# Patient Record
Sex: MALE | Race: WHITE
[De-identification: names, ages, dates, MRNs, and addresses within clinical notes are randomized per-mention and may not be internally consistent; named-entity substitution may affect disease eponyms.]

---

## 2017-03-30 ENCOUNTER — HOSPITAL ENCOUNTER (OUTPATIENT)
Dept: HOSPITAL 62 - OD | Age: 67
End: 2017-03-30
Attending: INTERNAL MEDICINE
Payer: MEDICARE

## 2017-03-30 DIAGNOSIS — R80.9: ICD-10-CM

## 2017-03-30 DIAGNOSIS — N25.81: ICD-10-CM

## 2017-03-30 DIAGNOSIS — N18.4: Primary | ICD-10-CM

## 2017-03-30 DIAGNOSIS — D50.9: ICD-10-CM

## 2017-03-30 LAB
ALBUMIN SERPL-MCNC: 4 G/DL (ref 3.5–5)
ANION GAP SERPL CALC-SCNC: 12 MMOL/L (ref 5–19)
APPEARANCE UR: CLEAR
BASOPHILS # BLD AUTO: 0 10^3/UL (ref 0–0.2)
BASOPHILS NFR BLD AUTO: 0.7 % (ref 0–2)
BILIRUB UR QL STRIP: NEGATIVE
BUN SERPL-MCNC: 55 MG/DL (ref 7–20)
CALCIUM: 9.4 MG/DL (ref 8.4–10.2)
CHLORIDE SERPL-SCNC: 114 MMOL/L (ref 98–107)
CO2 SERPL-SCNC: 21 MMOL/L (ref 22–30)
CREAT SERPL-MCNC: 4.13 MG/DL (ref 0.52–1.25)
EOSINOPHIL # BLD AUTO: 0.2 10^3/UL (ref 0–0.6)
EOSINOPHIL NFR BLD AUTO: 3.4 % (ref 0–6)
ERYTHROCYTE [DISTWIDTH] IN BLOOD BY AUTOMATED COUNT: 13.6 % (ref 11.5–14)
FERRITIN SERPL-MCNC: 45.6 NG/ML (ref 17.9–464)
GLUCOSE SERPL-MCNC: 103 MG/DL (ref 75–110)
GLUCOSE UR STRIP-MCNC: NEGATIVE MG/DL
HCT VFR BLD CALC: 29.7 % (ref 37.9–51)
HGB BLD-MCNC: 10 G/DL (ref 13.5–17)
HGB HCT DIFFERENCE: 0.3
KETONES UR STRIP-MCNC: NEGATIVE MG/DL
LYMPHOCYTES # BLD AUTO: 0.8 10^3/UL (ref 0.5–4.7)
LYMPHOCYTES NFR BLD AUTO: 17.4 % (ref 13–45)
MCH RBC QN AUTO: 31.4 PG (ref 27–33.4)
MCHC RBC AUTO-ENTMCNC: 33.8 G/DL (ref 32–36)
MCV RBC AUTO: 93 FL (ref 80–97)
MONOCYTES # BLD AUTO: 0.4 10^3/UL (ref 0.1–1.4)
MONOCYTES NFR BLD AUTO: 9 % (ref 3–13)
NEUTROPHILS # BLD AUTO: 3.1 10^3/UL (ref 1.7–8.2)
NEUTS SEG NFR BLD AUTO: 69.5 % (ref 42–78)
NITRITE UR QL STRIP: NEGATIVE
PH UR STRIP: 5 [PH] (ref 5–9)
PHOSPHATE SERPL-MCNC: 6 MG/DL (ref 2.5–4.5)
POTASSIUM SERPL-SCNC: 6.1 MMOL/L (ref 3.6–5)
PROT UR STRIP-MCNC: NEGATIVE MG/DL
RBC # BLD AUTO: 3.19 10^6/UL (ref 4.35–5.55)
SODIUM SERPL-SCNC: 147.1 MMOL/L (ref 137–145)
SP GR UR STRIP: 1.01
UROBILINOGEN UR-MCNC: NEGATIVE MG/DL (ref ?–2)
WBC # BLD AUTO: 4.5 10^3/UL (ref 4–10.5)

## 2017-03-30 PROCEDURE — 82040 ASSAY OF SERUM ALBUMIN: CPT

## 2017-03-30 PROCEDURE — 80048 BASIC METABOLIC PNL TOTAL CA: CPT

## 2017-03-30 PROCEDURE — 83550 IRON BINDING TEST: CPT

## 2017-03-30 PROCEDURE — 82043 UR ALBUMIN QUANTITATIVE: CPT

## 2017-03-30 PROCEDURE — 84100 ASSAY OF PHOSPHORUS: CPT

## 2017-03-30 PROCEDURE — 85025 COMPLETE CBC W/AUTO DIFF WBC: CPT

## 2017-03-30 PROCEDURE — 81001 URINALYSIS AUTO W/SCOPE: CPT

## 2017-03-30 PROCEDURE — 82570 ASSAY OF URINE CREATININE: CPT

## 2017-03-30 PROCEDURE — 82728 ASSAY OF FERRITIN: CPT

## 2017-03-30 PROCEDURE — 82306 VITAMIN D 25 HYDROXY: CPT

## 2017-03-30 PROCEDURE — 36415 COLL VENOUS BLD VENIPUNCTURE: CPT

## 2017-03-30 PROCEDURE — 83540 ASSAY OF IRON: CPT

## 2017-03-30 PROCEDURE — 83970 ASSAY OF PARATHORMONE: CPT

## 2017-03-31 LAB
25(OH)D3 SERPL-MCNC: 45.8 NG/ML (ref 30–100)
CREAT UR-MCNC: 65.1 MG/DL
MICROALBUMIN UR-MCNC: 61.4 UG/ML
PTH-INTACT SERPL-MCNC: 130 PG/ML (ref 15–65)

## 2017-04-05 ENCOUNTER — HOSPITAL ENCOUNTER (OUTPATIENT)
Dept: HOSPITAL 62 - OD | Age: 67
End: 2017-04-05
Attending: INTERNAL MEDICINE
Payer: MEDICARE

## 2017-04-05 DIAGNOSIS — E87.5: Primary | ICD-10-CM

## 2017-04-05 PROCEDURE — 36415 COLL VENOUS BLD VENIPUNCTURE: CPT

## 2017-04-05 PROCEDURE — 84132 ASSAY OF SERUM POTASSIUM: CPT

## 2017-06-16 ENCOUNTER — HOSPITAL ENCOUNTER (OUTPATIENT)
Dept: HOSPITAL 62 - OD | Age: 67
End: 2017-06-16
Attending: INTERNAL MEDICINE
Payer: MEDICARE

## 2017-06-16 DIAGNOSIS — D50.9: ICD-10-CM

## 2017-06-16 DIAGNOSIS — N18.4: Primary | ICD-10-CM

## 2017-06-16 DIAGNOSIS — N25.81: ICD-10-CM

## 2017-06-16 LAB
ANION GAP SERPL CALC-SCNC: 14 MMOL/L (ref 5–19)
BASOPHILS # BLD AUTO: 0 10^3/UL (ref 0–0.2)
BASOPHILS NFR BLD AUTO: 0.7 % (ref 0–2)
BUN SERPL-MCNC: 47 MG/DL (ref 7–20)
CALCIUM: 9 MG/DL (ref 8.4–10.2)
CHLORIDE SERPL-SCNC: 112 MMOL/L (ref 98–107)
CO2 SERPL-SCNC: 23 MMOL/L (ref 22–30)
CREAT SERPL-MCNC: 4.22 MG/DL (ref 0.52–1.25)
EOSINOPHIL # BLD AUTO: 0.1 10^3/UL (ref 0–0.6)
EOSINOPHIL NFR BLD AUTO: 2.9 % (ref 0–6)
ERYTHROCYTE [DISTWIDTH] IN BLOOD BY AUTOMATED COUNT: 13.7 % (ref 11.5–14)
FERRITIN SERPL-MCNC: 60.8 NG/ML (ref 17.9–464)
GLUCOSE SERPL-MCNC: 111 MG/DL (ref 75–110)
HCT VFR BLD CALC: 32.5 % (ref 37.9–51)
HGB BLD-MCNC: 10.7 G/DL (ref 13.5–17)
HGB HCT DIFFERENCE: -0.4
LYMPHOCYTES # BLD AUTO: 0.9 10^3/UL (ref 0.5–4.7)
LYMPHOCYTES NFR BLD AUTO: 18.9 % (ref 13–45)
MCH RBC QN AUTO: 31.3 PG (ref 27–33.4)
MCHC RBC AUTO-ENTMCNC: 33 G/DL (ref 32–36)
MCV RBC AUTO: 95 FL (ref 80–97)
MONOCYTES # BLD AUTO: 0.5 10^3/UL (ref 0.1–1.4)
MONOCYTES NFR BLD AUTO: 9.9 % (ref 3–13)
NEUTROPHILS # BLD AUTO: 3.3 10^3/UL (ref 1.7–8.2)
NEUTS SEG NFR BLD AUTO: 67.6 % (ref 42–78)
PHOSPHATE SERPL-MCNC: 5 MG/DL (ref 2.5–4.5)
POTASSIUM SERPL-SCNC: 5.4 MMOL/L (ref 3.6–5)
RBC # BLD AUTO: 3.43 10^6/UL (ref 4.35–5.55)
SODIUM SERPL-SCNC: 149.2 MMOL/L (ref 137–145)
WBC # BLD AUTO: 4.9 10^3/UL (ref 4–10.5)

## 2017-06-16 PROCEDURE — 80048 BASIC METABOLIC PNL TOTAL CA: CPT

## 2017-06-16 PROCEDURE — 83540 ASSAY OF IRON: CPT

## 2017-06-16 PROCEDURE — 82728 ASSAY OF FERRITIN: CPT

## 2017-06-16 PROCEDURE — 83970 ASSAY OF PARATHORMONE: CPT

## 2017-06-16 PROCEDURE — 83550 IRON BINDING TEST: CPT

## 2017-06-16 PROCEDURE — 84100 ASSAY OF PHOSPHORUS: CPT

## 2017-06-16 PROCEDURE — 85025 COMPLETE CBC W/AUTO DIFF WBC: CPT

## 2017-06-16 PROCEDURE — 36415 COLL VENOUS BLD VENIPUNCTURE: CPT

## 2017-08-24 ENCOUNTER — HOSPITAL ENCOUNTER (OUTPATIENT)
Dept: HOSPITAL 62 - END | Age: 67
Discharge: HOME | End: 2017-08-24
Attending: SURGERY
Payer: MEDICARE

## 2017-08-24 VITALS — SYSTOLIC BLOOD PRESSURE: 145 MMHG | DIASTOLIC BLOOD PRESSURE: 79 MMHG

## 2017-08-24 DIAGNOSIS — Q61.3: ICD-10-CM

## 2017-08-24 DIAGNOSIS — Z86.73: ICD-10-CM

## 2017-08-24 DIAGNOSIS — N40.0: ICD-10-CM

## 2017-08-24 DIAGNOSIS — D12.0: ICD-10-CM

## 2017-08-24 DIAGNOSIS — I10: ICD-10-CM

## 2017-08-24 DIAGNOSIS — D12.2: ICD-10-CM

## 2017-08-24 DIAGNOSIS — Z12.11: Primary | ICD-10-CM

## 2017-08-24 DIAGNOSIS — Z88.0: ICD-10-CM

## 2017-08-24 DIAGNOSIS — E78.00: ICD-10-CM

## 2017-08-24 DIAGNOSIS — M19.90: ICD-10-CM

## 2017-08-24 DIAGNOSIS — K57.30: ICD-10-CM

## 2017-08-24 DIAGNOSIS — E78.1: ICD-10-CM

## 2017-08-24 DIAGNOSIS — D64.9: ICD-10-CM

## 2017-08-24 PROCEDURE — 88305 TISSUE EXAM BY PATHOLOGIST: CPT

## 2017-08-24 PROCEDURE — 0DBH8ZX EXCISION OF CECUM, VIA NATURAL OR ARTIFICIAL OPENING ENDOSCOPIC, DIAGNOSTIC: ICD-10-PCS | Performed by: SURGERY

## 2017-08-24 PROCEDURE — 0DBF8ZX EXCISION OF RIGHT LARGE INTESTINE, VIA NATURAL OR ARTIFICIAL OPENING ENDOSCOPIC, DIAGNOSTIC: ICD-10-PCS | Performed by: SURGERY

## 2017-08-24 PROCEDURE — 45380 COLONOSCOPY AND BIOPSY: CPT

## 2017-08-24 RX ADMIN — MIDAZOLAM HYDROCHLORIDE ONE MG: 1 INJECTION, SOLUTION INTRAMUSCULAR; INTRAVENOUS at 08:36

## 2017-08-24 RX ADMIN — FENTANYL CITRATE ONE MCG: 50 INJECTION INTRAMUSCULAR; INTRAVENOUS at 08:46

## 2017-08-24 RX ADMIN — FENTANYL CITRATE ONE MCG: 50 INJECTION INTRAMUSCULAR; INTRAVENOUS at 08:38

## 2017-08-24 RX ADMIN — MIDAZOLAM HYDROCHLORIDE ONE MG: 1 INJECTION, SOLUTION INTRAMUSCULAR; INTRAVENOUS at 08:41

## 2017-08-24 RX ADMIN — MIDAZOLAM HYDROCHLORIDE ONE MG: 1 INJECTION, SOLUTION INTRAMUSCULAR; INTRAVENOUS at 08:51

## 2017-08-24 NOTE — PDOC DISCHARGE SUMMARY
Discharge Summary (SDC)





- Discharge


Final Diagnosis: 





colon polyps; diverticuloses


Date of Surgery: 08/24/17


Discharge Date: 08/24/17


Condition: Good


Treatment or Instructions: 


               





                  [facility] [place] [address]


 Phone: [phone]    Fax:  [phone]








            


            POST ENDOSCOPY DISCHARGE INSTRUCTIONS








1.  Diet:  Start clear liquids that a regular diet as tolerated.





2.  Resume all preoperative medications.  All oral anticoagulants and aspirins 

can be resumed 24 hours after procedure.





3.  If a polypectomy was performed some bleeding per rectum may occur.  This 

should stop within 3 days.  If not, please contact the office.





4.  If you had a colonoscopy you may experience some bloating and delayed 

return of normal bowel function for several days, your regular bowel movement 

pattern should resume within a week.





5.  Please contact [facility] at [phone] to make an 

appointment with Dr. Cardenas for 1 to 3 weeks following procedure.





6.  If you have any questions or concerns regarding your care,treatment plan or 

follow up, please contact our office.





7.  Per clinical guidelines we recommend you undergo a repeat colonoscopy in 

three years.


Referrals: 


XIMENA TONY MD [Primary Care Provider] - 


Discharge Diet: As Tolerated


Discharge Activity: Activity As Tolerated


Home Care Assistance: None Needed


Report the Following to Your Physician Immediately: Shortness of Breath, 

Increase in Pain, Fever over 101 Degrees

## 2017-08-24 NOTE — OPERATIVE REPORT E
Operative Report



NAME: ZAKIA GOODWIN

MRN:  [id number]          : 1950 AGE:  67Y

DATE OF SURGERY: 2017               ROOM:



PREOPERATIVE DIAGNOSIS:

Personal history of colon polyps.



POSTOPERATIVE DIAGNOSES:

1.  Polyps of the cecum and right colon.

2.  Sigmoid and left colon diverticulosis.

3.  Prostatic enlargement.



PROCEDURE:

1.  Total colonoscopy to the cecum with photo documentation.

2.  Cecal and right colon polypectomy.



SURGEON:

BOBBY SAHU M.D.



ANESTHESIA:

Conscious sedation.



COMPLICATIONS:

None.



ESTIMATED BLOOD LOSS:

Scant.



DRAINS:

None.



TISSUE REMOVED OR ALTERED:

Colon polyps x2.



FINDINGS:

See below.



SUMMARY OF PROCEDURE:

The patient was brought from the fifth floor endoscopy waiting area to the

endoscopy suite where conscious sedation was induced.  The patient was

placed in a left lateral decubitus position.  Surgical plan and surgical

timeout were conducted.



Rectal exam revealed no perianal pathology.  The posterior surface of the

prostate gland was enlarged.



The flexible adult colonoscope was advanced through the anorectal canal

all the way to the cecum.  This was an excellent study on a well prepped

bowel.  Transillumination of the anterior abdominal wall, the right lower

quadrant, and visualization of the ileocecal valve confirmed cecal

intubation.



Along the wall of the cecum was a sessile polyp photographed and removed

with the cold forceps device with 2 bites.  Bleeding was minimal. 

Specimen was sent as cecal polyp.



We withdrew the scope further through the right colon and there was

another smaller sessile polyp, photographed and retrieved with the cold

forceps device.  Bleeding was minimal.



The remainder of the colon was unremarkable except for a moderate of

diverticulosis of the left and sigmoid colon.  Photographs were taken. 

There was no evidence of stenosis or stricture.  Scope was withdrawn from

the patient's anus.  He tolerated the procedure well.  He was taken to the

recovery room in stable condition.



Per surveillance guidelines, patient will be an appropriate candidate for

followup colonoscopy in approximately 3 years, or sooner if any symptoms

develop.





DICTATING PHYSICIAN:  BOBBY SAHU M.D.





[id number]                  DT: 2017    [id number]

PHY#: [id number]            DD: 2017    0936

ID:   [id number]           JOB#: [id number]       ACCT: [id number]



cc:BOBBY SAHU M.D.

>

## 2017-08-28 ENCOUNTER — HOSPITAL ENCOUNTER (OUTPATIENT)
Dept: HOSPITAL 62 - OD | Age: 67
End: 2017-08-28
Attending: INTERNAL MEDICINE
Payer: MEDICARE

## 2017-08-28 DIAGNOSIS — D63.1: ICD-10-CM

## 2017-08-28 DIAGNOSIS — N18.5: Primary | ICD-10-CM

## 2017-08-28 DIAGNOSIS — N25.81: ICD-10-CM

## 2017-08-28 LAB
ANION GAP SERPL CALC-SCNC: 16 MMOL/L (ref 5–19)
BASOPHILS # BLD AUTO: 0 10^3/UL (ref 0–0.2)
BASOPHILS NFR BLD AUTO: 0.5 % (ref 0–2)
BUN SERPL-MCNC: 50 MG/DL (ref 7–20)
CALCIUM: 9.3 MG/DL (ref 8.4–10.2)
CHLORIDE SERPL-SCNC: 111 MMOL/L (ref 98–107)
CO2 SERPL-SCNC: 19 MMOL/L (ref 22–30)
CREAT SERPL-MCNC: 4.34 MG/DL (ref 0.52–1.25)
EOSINOPHIL # BLD AUTO: 0.2 10^3/UL (ref 0–0.6)
EOSINOPHIL NFR BLD AUTO: 3.3 % (ref 0–6)
ERYTHROCYTE [DISTWIDTH] IN BLOOD BY AUTOMATED COUNT: 13.1 % (ref 11.5–14)
GLUCOSE SERPL-MCNC: 102 MG/DL (ref 75–110)
HCT VFR BLD CALC: 29.5 % (ref 37.9–51)
HGB BLD-MCNC: 10 G/DL (ref 13.5–17)
HGB HCT DIFFERENCE: 0.5
LYMPHOCYTES # BLD AUTO: 0.8 10^3/UL (ref 0.5–4.7)
LYMPHOCYTES NFR BLD AUTO: 16.3 % (ref 13–45)
MCH RBC QN AUTO: 32.2 PG (ref 27–33.4)
MCHC RBC AUTO-ENTMCNC: 33.8 G/DL (ref 32–36)
MCV RBC AUTO: 95 FL (ref 80–97)
MONOCYTES # BLD AUTO: 0.4 10^3/UL (ref 0.1–1.4)
MONOCYTES NFR BLD AUTO: 7.6 % (ref 3–13)
NEUTROPHILS # BLD AUTO: 3.5 10^3/UL (ref 1.7–8.2)
NEUTS SEG NFR BLD AUTO: 72.3 % (ref 42–78)
POTASSIUM SERPL-SCNC: 4.5 MMOL/L (ref 3.6–5)
RBC # BLD AUTO: 3.1 10^6/UL (ref 4.35–5.55)
SODIUM SERPL-SCNC: 145.8 MMOL/L (ref 137–145)
WBC # BLD AUTO: 4.8 10^3/UL (ref 4–10.5)

## 2017-08-28 PROCEDURE — 85025 COMPLETE CBC W/AUTO DIFF WBC: CPT

## 2017-08-28 PROCEDURE — 83970 ASSAY OF PARATHORMONE: CPT

## 2017-08-28 PROCEDURE — 36415 COLL VENOUS BLD VENIPUNCTURE: CPT

## 2017-08-28 PROCEDURE — 80048 BASIC METABOLIC PNL TOTAL CA: CPT

## 2017-10-25 ENCOUNTER — HOSPITAL ENCOUNTER (OUTPATIENT)
Dept: HOSPITAL 62 - OD | Age: 67
End: 2017-10-25
Attending: INTERNAL MEDICINE
Payer: MEDICARE

## 2017-10-25 DIAGNOSIS — D50.9: ICD-10-CM

## 2017-10-25 DIAGNOSIS — N18.5: Primary | ICD-10-CM

## 2017-10-25 DIAGNOSIS — E83.39: ICD-10-CM

## 2017-10-25 DIAGNOSIS — R80.9: ICD-10-CM

## 2017-10-25 LAB
ALBUMIN SERPL-MCNC: 4.1 G/DL (ref 3.5–5)
ANION GAP SERPL CALC-SCNC: 16 MMOL/L (ref 5–19)
APPEARANCE UR: CLEAR
BASOPHILS # BLD AUTO: 0 10^3/UL (ref 0–0.2)
BASOPHILS NFR BLD AUTO: 0.7 % (ref 0–2)
BILIRUB UR QL STRIP: NEGATIVE
BUN SERPL-MCNC: 57 MG/DL (ref 7–20)
CALCIUM: 9.3 MG/DL (ref 8.4–10.2)
CHLORIDE SERPL-SCNC: 112 MMOL/L (ref 98–107)
CO2 SERPL-SCNC: 19 MMOL/L (ref 22–30)
CREAT SERPL-MCNC: 4.79 MG/DL (ref 0.52–1.25)
EOSINOPHIL # BLD AUTO: 0.1 10^3/UL (ref 0–0.6)
EOSINOPHIL NFR BLD AUTO: 2.6 % (ref 0–6)
ERYTHROCYTE [DISTWIDTH] IN BLOOD BY AUTOMATED COUNT: 12.8 % (ref 11.5–14)
FERRITIN SERPL-MCNC: 85.9 NG/ML (ref 17.9–464)
GLUCOSE SERPL-MCNC: 105 MG/DL (ref 75–110)
GLUCOSE UR STRIP-MCNC: NEGATIVE MG/DL
HCT VFR BLD CALC: 29.4 % (ref 37.9–51)
HGB BLD-MCNC: 10.1 G/DL (ref 13.5–17)
HGB HCT DIFFERENCE: 0.9
KETONES UR STRIP-MCNC: NEGATIVE MG/DL
LYMPHOCYTES # BLD AUTO: 0.8 10^3/UL (ref 0.5–4.7)
LYMPHOCYTES NFR BLD AUTO: 18 % (ref 13–45)
MCH RBC QN AUTO: 32.1 PG (ref 27–33.4)
MCHC RBC AUTO-ENTMCNC: 34.4 G/DL (ref 32–36)
MCV RBC AUTO: 93 FL (ref 80–97)
MONOCYTES # BLD AUTO: 0.4 10^3/UL (ref 0.1–1.4)
MONOCYTES NFR BLD AUTO: 7.8 % (ref 3–13)
NEUTROPHILS # BLD AUTO: 3.2 10^3/UL (ref 1.7–8.2)
NEUTS SEG NFR BLD AUTO: 70.9 % (ref 42–78)
NITRITE UR QL STRIP: NEGATIVE
PH UR STRIP: 5 [PH] (ref 5–9)
PHOSPHATE SERPL-MCNC: 5.1 MG/DL (ref 2.5–4.5)
POTASSIUM SERPL-SCNC: 4.9 MMOL/L (ref 3.6–5)
PROT UR STRIP-MCNC: NEGATIVE MG/DL
RBC # BLD AUTO: 3.16 10^6/UL (ref 4.35–5.55)
SODIUM SERPL-SCNC: 147.1 MMOL/L (ref 137–145)
SP GR UR STRIP: 1.01
UROBILINOGEN UR-MCNC: NEGATIVE MG/DL (ref ?–2)
WBC # BLD AUTO: 4.5 10^3/UL (ref 4–10.5)

## 2017-10-25 PROCEDURE — 82043 UR ALBUMIN QUANTITATIVE: CPT

## 2017-10-25 PROCEDURE — 82728 ASSAY OF FERRITIN: CPT

## 2017-10-25 PROCEDURE — 36415 COLL VENOUS BLD VENIPUNCTURE: CPT

## 2017-10-25 PROCEDURE — 82306 VITAMIN D 25 HYDROXY: CPT

## 2017-10-25 PROCEDURE — 82040 ASSAY OF SERUM ALBUMIN: CPT

## 2017-10-25 PROCEDURE — 83550 IRON BINDING TEST: CPT

## 2017-10-25 PROCEDURE — 85025 COMPLETE CBC W/AUTO DIFF WBC: CPT

## 2017-10-25 PROCEDURE — 84100 ASSAY OF PHOSPHORUS: CPT

## 2017-10-25 PROCEDURE — 83540 ASSAY OF IRON: CPT

## 2017-10-25 PROCEDURE — 81001 URINALYSIS AUTO W/SCOPE: CPT

## 2017-10-25 PROCEDURE — 82570 ASSAY OF URINE CREATININE: CPT

## 2017-10-25 PROCEDURE — 80048 BASIC METABOLIC PNL TOTAL CA: CPT

## 2017-10-26 LAB
CREAT UR-MCNC: 79.2 MG/DL
MICROALBUMIN UR-MCNC: 55.9 UG/ML

## 2017-12-03 ENCOUNTER — HOSPITAL ENCOUNTER (EMERGENCY)
Dept: HOSPITAL 62 - ER | Age: 67
Discharge: HOME | End: 2017-12-03
Payer: MEDICARE

## 2017-12-03 VITALS — SYSTOLIC BLOOD PRESSURE: 159 MMHG | DIASTOLIC BLOOD PRESSURE: 84 MMHG

## 2017-12-03 DIAGNOSIS — M79.89: ICD-10-CM

## 2017-12-03 DIAGNOSIS — L03.116: Primary | ICD-10-CM

## 2017-12-03 DIAGNOSIS — L50.9: ICD-10-CM

## 2017-12-03 DIAGNOSIS — R21: ICD-10-CM

## 2017-12-03 DIAGNOSIS — Q61.3: ICD-10-CM

## 2017-12-03 DIAGNOSIS — N18.5: ICD-10-CM

## 2017-12-03 LAB
ALBUMIN SERPL-MCNC: 4.1 G/DL (ref 3.5–5)
ALP SERPL-CCNC: 68 U/L (ref 38–126)
ALT SERPL-CCNC: 29 U/L (ref 21–72)
ANION GAP SERPL CALC-SCNC: 18 MMOL/L (ref 5–19)
AST SERPL-CCNC: 14 U/L (ref 17–59)
BASOPHILS # BLD AUTO: 0 10^3/UL (ref 0–0.2)
BASOPHILS NFR BLD AUTO: 0.2 % (ref 0–2)
BILIRUB DIRECT SERPL-MCNC: 0.3 MG/DL (ref 0–0.4)
BILIRUB SERPL-MCNC: 0.3 MG/DL (ref 0.2–1.3)
BUN SERPL-MCNC: 55 MG/DL (ref 7–20)
CALCIUM: 9 MG/DL (ref 8.4–10.2)
CHLORIDE SERPL-SCNC: 111 MMOL/L (ref 98–107)
CK SERPL-CCNC: 86 U/L (ref 55–170)
CO2 SERPL-SCNC: 19 MMOL/L (ref 22–30)
CREAT SERPL-MCNC: 5.36 MG/DL (ref 0.52–1.25)
EOSINOPHIL # BLD AUTO: 1 10^3/UL (ref 0–0.6)
EOSINOPHIL NFR BLD AUTO: 11.7 % (ref 0–6)
ERYTHROCYTE [DISTWIDTH] IN BLOOD BY AUTOMATED COUNT: 13.8 % (ref 11.5–14)
GLUCOSE SERPL-MCNC: 106 MG/DL (ref 75–110)
HCT VFR BLD CALC: 30.2 % (ref 37.9–51)
HGB BLD-MCNC: 10.5 G/DL (ref 13.5–17)
HGB HCT DIFFERENCE: 1.3
LYMPHOCYTES # BLD AUTO: 0.9 10^3/UL (ref 0.5–4.7)
LYMPHOCYTES NFR BLD AUTO: 10.4 % (ref 13–45)
MCH RBC QN AUTO: 32.5 PG (ref 27–33.4)
MCHC RBC AUTO-ENTMCNC: 34.6 G/DL (ref 32–36)
MCV RBC AUTO: 94 FL (ref 80–97)
MONOCYTES # BLD AUTO: 0.5 10^3/UL (ref 0.1–1.4)
MONOCYTES NFR BLD AUTO: 6.5 % (ref 3–13)
NEUTROPHILS # BLD AUTO: 5.9 10^3/UL (ref 1.7–8.2)
NEUTS SEG NFR BLD AUTO: 71.2 % (ref 42–78)
PHOSPHATE SERPL-MCNC: 4.8 MG/DL (ref 2.5–4.5)
POTASSIUM SERPL-SCNC: 4.6 MMOL/L (ref 3.6–5)
PROT SERPL-MCNC: 6.5 G/DL (ref 6.3–8.2)
RBC # BLD AUTO: 3.21 10^6/UL (ref 4.35–5.55)
SODIUM SERPL-SCNC: 148 MMOL/L (ref 137–145)
WBC # BLD AUTO: 8.2 10^3/UL (ref 4–10.5)

## 2017-12-03 PROCEDURE — 82550 ASSAY OF CK (CPK): CPT

## 2017-12-03 PROCEDURE — 36415 COLL VENOUS BLD VENIPUNCTURE: CPT

## 2017-12-03 PROCEDURE — 71010: CPT

## 2017-12-03 PROCEDURE — 83880 ASSAY OF NATRIURETIC PEPTIDE: CPT

## 2017-12-03 PROCEDURE — 99283 EMERGENCY DEPT VISIT LOW MDM: CPT

## 2017-12-03 PROCEDURE — 80053 COMPREHEN METABOLIC PANEL: CPT

## 2017-12-03 PROCEDURE — 84100 ASSAY OF PHOSPHORUS: CPT

## 2017-12-03 PROCEDURE — 85025 COMPLETE CBC W/AUTO DIFF WBC: CPT

## 2017-12-03 NOTE — RADIOLOGY REPORT (SQ)
EXAM DESCRIPTION:  CHEST SINGLE VIEW



COMPLETED DATE/TIME:  12/3/2017 5:06 am



REASON FOR STUDY:  cough



COMPARISON:  3/4/2016.



EXAM PARAMETERS:  NUMBER OF VIEWS: One view.

TECHNIQUE: Single frontal radiographic view of the chest acquired.

RADIATION DOSE: NA

LIMITATIONS: None.



FINDINGS:  LUNGS AND PLEURA: No opacities, masses or pneumothorax. No pleural effusion.

MEDIASTINUM AND HILAR STRUCTURES: No masses.  Contour normal.

HEART AND VASCULAR STRUCTURES: Heart normal in size.  Normal vasculature.

BONES: No acute findings.

HARDWARE: None in the chest.

OTHER: No other significant finding.



IMPRESSION:  NO ACUTE RADIOGRAPHIC FINDING IN THE CHEST.



TECHNICAL DOCUMENTATION:  JOB ID:  [id number]

 2011 [employer]- All Rights Reserved

## 2017-12-03 NOTE — ER DOCUMENT REPORT
ED Extremity Problem, Lower





- General


Chief Complaint: Leg Swelling


Stated Complaint: LEG SWELLING


Time Seen by Provider: 12/03/17 03:43


Notes: 


The patient is a 67-year-old male, past medical history Stage 5 CKD, Polycystic 

Kidney Disease, hypertension, peripheral edema, presents with 1 week of 

increased bilateral lower leg swelling.  In addition, he noticed a pruritic 

erythematous rash over his left ankle.  He saw Dr. Rice and started on 

doxycycline 4 days ago.  He thinks that it is not getting much better and is 

slightly enlarged.  Patient also has had a diffuse pruritic rash for several 

weeks and he has tried calamine lotion with only mild relief of his symptoms.  

Patient is due to have a dialysis graft placed this week and start dialysis 

next week.  He follows with Dr. Tony.  Patient denies chest pain, shortness 

of breath, fevers, nausea, vomiting, numbness, tingling, difficulty walking or 

headache.


TRAVEL OUTSIDE OF THE [place] IN LAST 30 DAYS: No





- Related Data


Allergies/Adverse Reactions: 


 





Penicillins Allergy (Verified 12/03/17 02:15)


 RASH











Past Medical History





- General


Information source: Patient





- Social History


Smoking Status: Never Smoker


Family History: Reviewed & Not Pertinent


Patient has suicidal ideation: No


Patient has homicidal ideation: No





- Past Medical History


Cardiac Medical History: Reports: Hx Hypertension


   Denies: Hx Coronary Artery Disease, Hx Heart Attack


Pulmonary Medical History: 


   Denies: Hx Asthma, Hx Bronchitis, Hx COPD, Hx Pneumonia


Neurological Medical History: Denies: Hx Cerebrovascular Accident, Hx Seizures


Renal/ Medical History: Denies: Hx Peritoneal Dialysis


GI Medical History: Denies: Hx Hepatitis, Hx Hiatal Hernia, Hx Ulcer


Musculoskeltal Medical History: Reports Hx Arthritis - RIGHT HAND


Infectious Medical History: Denies: Hx Hepatitis


Past Surgical History: Denies: Hx Open Heart Surgery, Hx Pacemaker





- Immunizations


Hx Diphtheria, Pertussis, Tetanus Vaccination: Yes





Review of Systems





- Review of Systems


Notes: 


REVIEW OF SYSTEMS:


CONSTITUTIONAL: -fevers, -chills


EENT: -eye pain, -difficulty swallowing, -nasal congestion


CARDIOVASCULAR: -chest pain, -syncope.


RESPIRATORY: -cough, -SOB


GASTROINTESTINAL:  -abdominal pain, - nausea, -vomiting, -diarrhea


GENITOURINARY: -dysuria, -hematuria


MUSCULOSKELETAL: -back pain, -neck pain


SKIN: +rash


HEMATOLOGIC: -easy bruising or bleeding.


LYMPHATIC: -swollen, enlarged glands.


NEUROLOGICAL: -altered mental status or loss of consciousness, -headache, -

neurologic symptoms


PSYCHIATRIC: -anxiety, -depression.


ALL OTHER SYSTEMS REVIEWED AND NEGATIVE.





Physical Exam





- Vital signs


Vitals: 


 











Temp Pulse Resp BP Pulse Ox


 


 97.6 F   102 H  20   126/84 H  100 


 


 12/03/17 02:22  12/03/17 02:22  12/03/17 02:22  12/03/17 02:22  12/03/17 02:22














- Notes


Notes: 


PHYSICAL EXAMINATION:





GENERAL: Well-appearing, well-nourished and in no acute distress.





HEAD: Atraumatic, normocephalic.





EYES: Pupils equal round and reactive to light, extraocular movements intact, 

sclera anicteric, conjunctiva are normal.





ENT: nares patent, oropharynx clear without exudates.  Moist mucous membranes.





NECK: Normal range of motion, supple without lymphadenopathy





LUNGS: Breath sounds clear to auscultation bilaterally and equal.  No wheezes 

rales or rhonchi.





HEART: Regular rate and rhythm without murmurs





ABDOMEN: Soft, nontender, normoactive bowel sounds.  No guarding, no rebound.  

No masses appreciated.





EXTREMITIES: 2+ pitting edema in bilateral lower extremities. No painful ROM of 

left ankle.





NEUROLOGICAL: Cranial nerves grossly intact.  Normal speech, normal gait.  

Normal sensory and motor exams.





PSYCH: Normal mood, normal affect.





SKIN: Diffuse urticarial rash. Erythematous area in medial left ankle.





Course





- Re-evaluation


Re-evalutation: 


Pt with B/L lower extremity edema. No calf tenderness or history of blood 

clots. Suspect peripheral edema from his CKD.  No respiratory distress and 

lungs are clear.  His diffuse pruritic rash is most likely from 

hyperphosphatemia.  Will begin low-dose PhosLo and Atarax to help with his 

pruritus.  We will switch his doxycycline over to clindamycin to help with his 

left medial ankle cellulitis.  No fevers or leukocytosis.  Also, he is having 

no painful left ankle range of motion to suggest a septic joint.  He has an 

appointment with his nephrologist and primary care physician this week with a 

plan to place his dialysis catheter.  Given very strict return precautions and 

he understands.





- Vital Signs


Vital signs: 


 











Temp Pulse Resp BP Pulse Ox


 


 97.6 F   102 H  20   126/84 H  100 


 


 12/03/17 02:22  12/03/17 02:22  12/03/17 02:22  12/03/17 02:22  12/03/17 02:22














- Laboratory


Result Diagrams: 


 12/03/17 04:02





 12/03/17 04:02


Laboratory results interpreted by me: 


 











  12/03/17 12/03/17





  04:02 04:02


 


RBC  3.21 L 


 


Hgb  10.5 L 


 


Hct  30.2 L 


 


Lymphocytes %  10.4 L 


 


Eosinophils %  11.7 H 


 


Absolute Eosinophils  1.0 H 


 


Sodium   148.0 H


 


Chloride   111 H


 


Carbon Dioxide   19 L


 


BUN   55 H


 


Creatinine   5.36 H


 


Est GFR ( Amer)   13 L


 


Est GFR (Non-Af Amer)   11 L


 


Phosphorus   4.8 H


 


AST   14 L














Discharge





- Discharge


Clinical Impression: 


 Urticarial rash





Cellulitis


Qualifiers:


 Site of cellulitis: extremity Site of cellulitis of extremity: lower extremity 

Laterality: left Qualified Code(s): L03.116 - Cellulitis of left lower limb





Condition: Stable


Disposition: HOME, SELF-CARE


Additional Instructions: 


ANTIHISTAMINES:


     An antihistamine has been given and/or prescribed to control your 

symptoms. Antihistamines are used for many reasons, including itching, watering 

eyes, runny nose, allergic swelling, hives, and insect stings.


     Antihistamines may cause drowsiness, especially with the first dose.  Do 

not operate machinery or drive while under the effects of the medication.  

Other common side effects include dry mouth and eyes.  In older persons, 

antihistamines can occasionally cause urinary retention, constipation, and 

trouble focusing the eyes.


     Do not combine the medication with alcohol, or with any other medication 

without talking to your doctor.





FOLLOW-UP CARE:


If you have been referred to a physician for follow-up care, call the physician

s office for an appointment as you were instructed or within the next two days.

  If you experience worsening or a significant change in your symptoms, notify 

the physician immediately or return to the Emergency Department at any time for 

re-evaluation.





CELLULITIS:





     You have an infection of your skin and underlying soft tissues called 

cellulitis.  This is due to bacteria, which can enter through any break in the 

skin, or even through an irritated hair follicle.  Untreated, cellulitis will 

usually worsen.


     Antibiotics are required.  Usually, warm packs or warm soaks, and 

elevation of the infected area are recommended.  You should start getting 

better within 24 to 36 hours.


     Most infections respond quickly to the right medication. Follow-up care is 

important, however, to check for abscess (boil) formation, unsuspected foreign 

body, or resistant infection.


     If you develop fever, chills, or if the area of infection is becoming 

rapidly more swollen or painful, call the doctor at once.








MRSA CELLULITIS:





     You have an infection of your skin and underlying soft tissues called 

cellulitis.  This is due to bacteria, which can enter through any break in the 

skin, or even through an irritated hair follicle.  Untreated, cellulitis will 

usually worsen and may form an abscess which requires draining.


     Although many bacterial organisms can cause cellulitis and abscess 

formations, the most likely bacteria is Methicillin-Resistant Staph Aureus, or 

MRSA for short.  Antibiotics are required.  Usually, warm packs or warm soaks, 

and elevation of the infected area are recommended.  You should start getting 

better within 24 to 36 hours.


     Most infections respond quickly to the right medication. Follow-up care is 

important, however, to check for abscess (boil) formation, unsuspected foreign 

body, or resistant infection.


     If you develop fever, chills, or if the area of infection is becoming 

rapidly more swollen or painful, call the doctor at once.








ANTIBIOTIC THERAPY:


     You have been given an antibiotic prescription.  It's important that you 

take all the medication, unless instructed otherwise by your physician.  

Failure to complete the entire course can result in relapse of your condition.


     Common side effects of antibiotics include nausea, intestinal cramping, or 

diarrhea.  Women may develop vaginal yeast infections, and babies can get yeast 

(thrush) in the mouth following the use of antibiotics.  Contact your physician 

if you develop significant side effects from this medication.


     Allergy to this antibiotic can result in hives, wheezing, faintness, or 

itching.  If symptoms of allergy occur, stop the medication and call the doctor.








CLINDAMYCIN:


     You have been given a prescription for the antibiotic clindamycin.  It is 

often prescribed for infections in the mouth, such as dental infections or 

abscesses, and for skin infections due to MRSA.  It's important that you take 

all the medication, unless instructed otherwise by your physician.  Failure to 

complete the entire course can result in relapse of your condition.


     Common side effects of antibiotics include nausea, intestinal cramping, or 

diarrhea.  Women may develop vaginal yeast infections, and babies can get yeast 

(thrush) in the mouth following the use of antibiotics.  Contact your physician 

if you develop significant side effects from this medication.


     Allergy to this antibiotic can result in hives, wheezing, faintness, or 

itching.  If symptoms of allergy occur, stop the medication and call the doctor.








FOLLOW-UP CARE:


If you have been referred to a physician for follow-up care, call the physician

s office for an appointment as you were instructed or within the next two days.

  If you experience worsening or a significant change in your symptoms, notify 

the physician immediately or return to the Emergency Department at any time for 

re-evaluation.


Prescriptions: 


Clindamycin HCl 300 mg PO Q8H #21 capsule


Hydroxyzine HCl [Atarax 10 mg Tablet] 10 mg PO TID PRN #30 tablet


 PRN Reason: 


Referrals: 


MELISSA WOOTEN MD [Primary Care Provider] - Follow up as needed


XIMENA TONY MD [ACTIVE STAFF] - Follow up as needed

## 2017-12-04 ENCOUNTER — HOSPITAL ENCOUNTER (INPATIENT)
Dept: HOSPITAL 62 - ER | Age: 67
LOS: 8 days | Discharge: HOME | DRG: 602 | End: 2017-12-12
Attending: FAMILY MEDICINE | Admitting: INTERNAL MEDICINE
Payer: MEDICARE

## 2017-12-04 DIAGNOSIS — I12.0: ICD-10-CM

## 2017-12-04 DIAGNOSIS — L50.9: ICD-10-CM

## 2017-12-04 DIAGNOSIS — Z79.899: ICD-10-CM

## 2017-12-04 DIAGNOSIS — E78.5: ICD-10-CM

## 2017-12-04 DIAGNOSIS — Z79.82: ICD-10-CM

## 2017-12-04 DIAGNOSIS — M19.041: ICD-10-CM

## 2017-12-04 DIAGNOSIS — E83.39: ICD-10-CM

## 2017-12-04 DIAGNOSIS — N25.81: ICD-10-CM

## 2017-12-04 DIAGNOSIS — Z85.828: ICD-10-CM

## 2017-12-04 DIAGNOSIS — Z86.73: ICD-10-CM

## 2017-12-04 DIAGNOSIS — Z88.0: ICD-10-CM

## 2017-12-04 DIAGNOSIS — D63.1: ICD-10-CM

## 2017-12-04 DIAGNOSIS — L03.116: Primary | ICD-10-CM

## 2017-12-04 DIAGNOSIS — Q61.3: ICD-10-CM

## 2017-12-04 DIAGNOSIS — N18.6: ICD-10-CM

## 2017-12-04 DIAGNOSIS — Z66: ICD-10-CM

## 2017-12-04 DIAGNOSIS — Z87.891: ICD-10-CM

## 2017-12-04 LAB
ALBUMIN SERPL-MCNC: 4.2 G/DL (ref 3.5–5)
ALP SERPL-CCNC: 73 U/L (ref 38–126)
ALT SERPL-CCNC: 25 U/L (ref 21–72)
ANION GAP SERPL CALC-SCNC: 17 MMOL/L (ref 5–19)
APPEARANCE UR: CLEAR
AST SERPL-CCNC: 15 U/L (ref 17–59)
BASE EXCESS BLDV CALC-SCNC: -5.3 MMOL/L
BASOPHILS # BLD AUTO: 0 10^3/UL (ref 0–0.2)
BASOPHILS NFR BLD AUTO: 0.4 % (ref 0–2)
BILIRUB DIRECT SERPL-MCNC: 0.3 MG/DL (ref 0–0.4)
BILIRUB SERPL-MCNC: 0.3 MG/DL (ref 0.2–1.3)
BILIRUB UR QL STRIP: NEGATIVE
BUN SERPL-MCNC: 53 MG/DL (ref 7–20)
CALCIUM: 8.9 MG/DL (ref 8.4–10.2)
CHLORIDE SERPL-SCNC: 109 MMOL/L (ref 98–107)
CO2 SERPL-SCNC: 17 MMOL/L (ref 22–30)
CREAT SERPL-MCNC: 5.16 MG/DL (ref 0.52–1.25)
EOSINOPHIL # BLD AUTO: 1.5 10^3/UL (ref 0–0.6)
EOSINOPHIL NFR BLD AUTO: 16.1 % (ref 0–6)
ERYTHROCYTE [DISTWIDTH] IN BLOOD BY AUTOMATED COUNT: 13.9 % (ref 11.5–14)
GLUCOSE SERPL-MCNC: 97 MG/DL (ref 75–110)
GLUCOSE UR STRIP-MCNC: NEGATIVE MG/DL
HCO3 BLDV-SCNC: 19.9 MMOL/L (ref 20–32)
HCT VFR BLD CALC: 32.2 % (ref 37.9–51)
HGB BLD-MCNC: 11 G/DL (ref 13.5–17)
HGB HCT DIFFERENCE: 0.8
KETONES UR STRIP-MCNC: NEGATIVE MG/DL
LYMPHOCYTES # BLD AUTO: 1.4 10^3/UL (ref 0.5–4.7)
LYMPHOCYTES NFR BLD AUTO: 15.6 % (ref 13–45)
MCH RBC QN AUTO: 32.4 PG (ref 27–33.4)
MCHC RBC AUTO-ENTMCNC: 34.2 G/DL (ref 32–36)
MCV RBC AUTO: 95 FL (ref 80–97)
MONOCYTES # BLD AUTO: 0.6 10^3/UL (ref 0.1–1.4)
MONOCYTES NFR BLD AUTO: 7.1 % (ref 3–13)
NEUTROPHILS # BLD AUTO: 5.5 10^3/UL (ref 1.7–8.2)
NEUTS SEG NFR BLD AUTO: 60.8 % (ref 42–78)
NITRITE UR QL STRIP: NEGATIVE
PCO2 BLDV: 37.9 MMHG (ref 35–63)
PH BLDV: 7.34 [PH] (ref 7.3–7.42)
PH UR STRIP: 5 [PH] (ref 5–9)
POTASSIUM SERPL-SCNC: 4.7 MMOL/L (ref 3.6–5)
PROT SERPL-MCNC: 6.5 G/DL (ref 6.3–8.2)
PROT UR STRIP-MCNC: 30 MG/DL
RBC # BLD AUTO: 3.41 10^6/UL (ref 4.35–5.55)
SODIUM SERPL-SCNC: 143.2 MMOL/L (ref 137–145)
SP GR UR STRIP: 1.01
UROBILINOGEN UR-MCNC: NEGATIVE MG/DL (ref ?–2)
WBC # BLD AUTO: 9.1 10^3/UL (ref 4–10.5)
WBC #/AREA URNS HPF: (no result) /HPF

## 2017-12-04 PROCEDURE — 77001 FLUOROGUIDE FOR VEIN DEVICE: CPT

## 2017-12-04 PROCEDURE — 84466 ASSAY OF TRANSFERRIN: CPT

## 2017-12-04 PROCEDURE — 71010: CPT

## 2017-12-04 PROCEDURE — 83880 ASSAY OF NATRIURETIC PEPTIDE: CPT

## 2017-12-04 PROCEDURE — 99283 EMERGENCY DEPT VISIT LOW MDM: CPT

## 2017-12-04 PROCEDURE — 85045 AUTOMATED RETICULOCYTE COUNT: CPT

## 2017-12-04 PROCEDURE — 87340 HEPATITIS B SURFACE AG IA: CPT

## 2017-12-04 PROCEDURE — 83605 ASSAY OF LACTIC ACID: CPT

## 2017-12-04 PROCEDURE — 82607 VITAMIN B-12: CPT

## 2017-12-04 PROCEDURE — 36415 COLL VENOUS BLD VENIPUNCTURE: CPT

## 2017-12-04 PROCEDURE — 83550 IRON BINDING TEST: CPT

## 2017-12-04 PROCEDURE — 76937 US GUIDE VASCULAR ACCESS: CPT

## 2017-12-04 PROCEDURE — 84100 ASSAY OF PHOSPHORUS: CPT

## 2017-12-04 PROCEDURE — 83970 ASSAY OF PARATHORMONE: CPT

## 2017-12-04 PROCEDURE — 87522 HEPATITIS C REVRS TRNSCRPJ: CPT

## 2017-12-04 PROCEDURE — 86317 IMMUNOASSAY INFECTIOUS AGENT: CPT

## 2017-12-04 PROCEDURE — 82746 ASSAY OF FOLIC ACID SERUM: CPT

## 2017-12-04 PROCEDURE — 87086 URINE CULTURE/COLONY COUNT: CPT

## 2017-12-04 PROCEDURE — 87040 BLOOD CULTURE FOR BACTERIA: CPT

## 2017-12-04 PROCEDURE — 82803 BLOOD GASES ANY COMBINATION: CPT

## 2017-12-04 PROCEDURE — 83540 ASSAY OF IRON: CPT

## 2017-12-04 PROCEDURE — 86704 HEP B CORE ANTIBODY TOTAL: CPT

## 2017-12-04 PROCEDURE — 81001 URINALYSIS AUTO W/SCOPE: CPT

## 2017-12-04 PROCEDURE — 82550 ASSAY OF CK (CPK): CPT

## 2017-12-04 PROCEDURE — C1713 ANCHOR/SCREW BN/BN,TIS/BN: HCPCS

## 2017-12-04 PROCEDURE — 85027 COMPLETE CBC AUTOMATED: CPT

## 2017-12-04 PROCEDURE — 82728 ASSAY OF FERRITIN: CPT

## 2017-12-04 PROCEDURE — 80048 BASIC METABOLIC PNL TOTAL CA: CPT

## 2017-12-04 PROCEDURE — 80202 ASSAY OF VANCOMYCIN: CPT

## 2017-12-04 PROCEDURE — 85025 COMPLETE CBC W/AUTO DIFF WBC: CPT

## 2017-12-04 PROCEDURE — 99284 EMERGENCY DEPT VISIT MOD MDM: CPT

## 2017-12-04 PROCEDURE — 36558 INSERT TUNNELED CV CATH: CPT

## 2017-12-04 PROCEDURE — C1752 CATH,HEMODIALYSIS,SHORT-TERM: HCPCS

## 2017-12-04 PROCEDURE — 80053 COMPREHEN METABOLIC PANEL: CPT

## 2017-12-04 NOTE — ER DOCUMENT REPORT
ED General





- General


Chief Complaint: Leg Swelling


Stated Complaint: KIDNEY PROBLEMS


Time Seen by Provider: 12/04/17 19:03


Notes: 





Patient is a 67-year-old male with a past medical history of polycystic kidney 

disease, plan to start dialysis next week who presents with 4 days of 

progressive worsening rash over his bilateral lower extremities, bilateral 

upper extremities and abdomen.  He was seen in the emergency department 

yesterday and informed this may be related to his chronic kidney disease and 

the need for dialysis.  He will follow-up with his nephrologist today who 

referred him back to the emergency department with concerns that this may be an 

acute cellulitis.  Patient was started on doxycycline recently for an area of 

cellulitis on the left back medial malleolus and states that the rash started 

shortly thereafter.  He does describe it as a dull, constant, burning pain to 

the affected areas.  Nothing improves or worsens the symptoms.  He denies any 

shortness of breath, vomiting, or syncope.  No history of similar symptoms in 

the past.  He has not had any fever or constitutional symptoms.


TRAVEL OUTSIDE OF THE [place] IN LAST 30 DAYS: No





- Related Data


Allergies/Adverse Reactions: 


 





Penicillins Allergy (Verified 12/04/17 17:12)


 RASH











Past Medical History





- General


Information source: Patient





- Social History


Smoking Status: Former Smoker


Chew tobacco use (# tins/day): No


Frequency of alcohol use: None


Drug Abuse: None


Family History: Reviewed & Not Pertinent


Patient has suicidal ideation: No


Patient has homicidal ideation: No





- Past Medical History


Cardiac Medical History: Reports: Hx Hypercholesterolemia, Hx Hypertension


   Denies: Hx Coronary Artery Disease, Hx Heart Attack


Pulmonary Medical History: 


   Denies: Hx Asthma, Hx Bronchitis, Hx COPD, Hx Pneumonia


Neurological Medical History: Denies: Hx Cerebrovascular Accident, Hx Seizures


Renal/ Medical History: Denies: Hx Peritoneal Dialysis


GI Medical History: Denies: Hx Hepatitis, Hx Hiatal Hernia, Hx Ulcer


Musculoskeltal Medical History: Reports Hx Arthritis - RIGHT HAND


Infectious Medical History: Denies: Hx Hepatitis


Past Surgical History: Denies: Hx Open Heart Surgery, Hx Pacemaker





- Immunizations


Hx Diphtheria, Pertussis, Tetanus Vaccination: Yes





Review of Systems





- Review of Systems


Notes: 





Constitutional: Negative for fever.


HENT: Negative for sore throat.


Eyes: Negative for visual changes.


Cardiovascular: Negative for chest pain.


Respiratory: Negative for shortness of breath.


Gastrointestinal: Negative for abdominal pain, vomiting or diarrhea.


Genitourinary: Negative for dysuria.


Musculoskeletal: Positive for bilateral lower extremity pain


Skin: Positive for rash.


Neurological: Negative for headaches, weakness or numbness.





10 point ROS negative except as marked above and in HPI.





Physical Exam





- Vital signs


Vitals: 


 











Temp Pulse Resp BP Pulse Ox


 


 97.8 F   105 H  19   150/91 H  100 


 


 12/04/17 17:16  12/04/17 17:16  12/04/17 17:16  12/04/17 17:16  12/04/17 17:16











Interpretation: Hypertensive, Tachycardic


Notes: 





PHYSICAL EXAMINATION:





GENERAL: Well-appearing, well-nourished and in no acute distress.





HEAD: Atraumatic, normocephalic.





EYES: Pupils equal round and reactive to light, extraocular movements intact, 

sclera anicteric, conjunctiva are normal.





ENT: nares patent, oropharynx clear without exudates.  Moist mucous membranes.





NECK: Normal range of motion, supple without lymphadenopathy





LUNGS: Breath sounds clear to auscultation bilaterally and equal.  No wheezes 

rales or rhonchi.





HEART: Regular rate and rhythm without murmurs





ABDOMEN: Soft, nontender, normoactive bowel sounds.  No guarding, no rebound.  

No masses appreciated.





EXTREMITIES: Normal range of motion, 1+ pitting edema that is equal and 

symmetric in the bilateral lower extremities.





NEUROLOGICAL: No focal neurological deficits. Moves all extremities 

spontaneously and on command.





PSYCH: Normal mood, normal affect.





SKIN: Warm, Dry, normal turgor, diffuse scaling, erythematous rash in the 

bilateral lower extremity.  There is a macular rash over the abdomen.





Course





- Re-evaluation


Re-evalutation: 





12/04/17 23:05


Patient presents with bilateral lower extremity plaques, erythema, more 

consistent with calciphylaxis and hyperphosphatemia as opposed to an acute area 

of cellulitis although there is a distinct area of silence over the medial 

malleolus the left lower extremity.  Patient was referred by his nephrologist 

for IV antibiotics and admission.  No significant leukocytosis although he was 

mildly tachycardic at time of presentation.  Patient is otherwise nontoxic in 

appearance.  I discussed this case with the hospitalist who is agreed to accept 

the patient for admission given outpatient providers requests and IV vancomycin 

and itself ceftriaxone have been started.





- Vital Signs


Vital signs: 


 











Temp Pulse Resp BP Pulse Ox


 


 98.2 F   105 H  17   150/95 H  100 


 


 12/04/17 22:30  12/04/17 17:16  12/05/17 01:31  12/05/17 01:31  12/05/17 01:31














- Laboratory


Result Diagrams: 


 12/04/17 19:45





 12/04/17 19:45


Laboratory results interpreted by me: 


 











  12/04/17 12/04/17 12/04/17





  19:45 19:45 19:45


 


RBC  3.41 L  


 


Hgb  11.0 L  


 


Hct  32.2 L  


 


Eosinophils %  16.1 H  


 


Absolute Eosinophils  1.5 H  


 


VBG HCO3    19.9 L


 


Chloride   109 H 


 


Carbon Dioxide   17 L 


 


BUN   53 H 


 


Creatinine   5.16 H 


 


Est GFR ( Amer)   14 L 


 


Est GFR (Non-Af Amer)   11 L 


 


AST   15 L 


 


Urine Protein   


 


Urine Ascorbic Acid   














  12/04/17





  19:45


 


RBC 


 


Hgb 


 


Hct 


 


Eosinophils % 


 


Absolute Eosinophils 


 


VBG HCO3 


 


Chloride 


 


Carbon Dioxide 


 


BUN 


 


Creatinine 


 


Est GFR ( Amer) 


 


Est GFR (Non-Af Amer) 


 


AST 


 


Urine Protein  30 H


 


Urine Ascorbic Acid  20 H














Discharge





- Discharge


Clinical Impression: 


 Cellulitis of left lower extremity





Condition: Fair


Disposition: ADMITTED AS OBSERVATION


Admitting Provider: Hospitalist - [place]


Unit Admitted: Medical Floor

## 2017-12-04 NOTE — ER DOCUMENT REPORT
ED Medical Screen (RME)





- General


Chief Complaint: Leg Swelling


Stated Complaint: KIDNEY PROBLEMS


Time Seen by Provider: 12/04/17 19:03


Notes: 





Patient was sent from the request that the patient be admitted to the hospital.

  She states she will consult on the patient.  She also asked that Dr. RAMIREZ or the 

hospitalist do the admission.  Patient has cellulitis on his leg she stated.  

She has an IV antibiotics be started for this.  She also request that IV fluids 

be initiated.


TRAVEL OUTSIDE OF THE [place] IN LAST 30 DAYS: No





- Related Data


Allergies/Adverse Reactions: 


 





Penicillins Allergy (Verified 12/04/17 17:12)


 RASH











Past Medical History





- Social History


Chew tobacco use (# tins/day): No


Frequency of alcohol use: None


Drug Abuse: None





- Past Medical History


Cardiac Medical History: Reports: Hx Hypertension


   Denies: Hx Coronary Artery Disease, Hx Heart Attack


Pulmonary Medical History: 


   Denies: Hx Asthma, Hx Bronchitis, Hx COPD, Hx Pneumonia


Neurological Medical History: Denies: Hx Cerebrovascular Accident, Hx Seizures


Renal/ Medical History: Denies: Hx Peritoneal Dialysis


GI Medical History: Denies: Hx Hepatitis, Hx Hiatal Hernia, Hx Ulcer


Musculoskeltal Medical History: Reports Hx Arthritis - RIGHT HAND


Infectious Medical History: Denies: Hx Hepatitis


Past Surgical History: Denies: Hx Open Heart Surgery, Hx Pacemaker





- Immunizations


Hx Diphtheria, Pertussis, Tetanus Vaccination: Yes





Physical Exam





- Vital signs


Vitals: 





 











Temp Pulse Resp BP Pulse Ox


 


 97.8 F   105 H  19   150/91 H  100 


 


 12/04/17 17:16  12/04/17 17:16  12/04/17 17:16  12/04/17 17:16  12/04/17 17:16














Course





- Vital Signs


Vital signs: 





 











Temp Pulse Resp BP Pulse Ox


 


 97.8 F   105 H  19   150/91 H  100 


 


 12/04/17 17:16  12/04/17 17:16  12/04/17 17:16  12/04/17 17:16  12/04/17 17:16

## 2017-12-05 PROCEDURE — B54BZZA ULTRASONOGRAPHY OF RIGHT LOWER EXTREMITY VEINS, GUIDANCE: ICD-10-PCS | Performed by: SURGERY

## 2017-12-05 PROCEDURE — 06HM33Z INSERTION OF INFUSION DEVICE INTO RIGHT FEMORAL VEIN, PERCUTANEOUS APPROACH: ICD-10-PCS | Performed by: SURGERY

## 2017-12-05 RX ADMIN — AMLODIPINE BESYLATE SCH MG: 10 TABLET ORAL at 09:54

## 2017-12-05 RX ADMIN — ASPIRIN SCH MG: 81 TABLET, COATED ORAL at 09:53

## 2017-12-05 RX ADMIN — Medication SCH ML: at 21:50

## 2017-12-05 RX ADMIN — CARVEDILOL SCH MG: 6.25 TABLET, FILM COATED ORAL at 17:43

## 2017-12-05 RX ADMIN — CARVEDILOL SCH MG: 6.25 TABLET, FILM COATED ORAL at 09:52

## 2017-12-05 RX ADMIN — Medication SCH UNIT: at 21:50

## 2017-12-05 RX ADMIN — CEFTRIAXONE SCH ML: 1 INJECTION, SOLUTION INTRAVENOUS at 09:54

## 2017-12-05 RX ADMIN — FAMOTIDINE SCH MG: 20 TABLET, FILM COATED ORAL at 21:49

## 2017-12-05 RX ADMIN — Medication SCH ML: at 15:05

## 2017-12-05 RX ADMIN — HEPARIN SODIUM SCH UNIT: 5000 INJECTION, SOLUTION INTRAVENOUS; SUBCUTANEOUS at 15:05

## 2017-12-05 RX ADMIN — HEPARIN SODIUM SCH UNIT: 5000 INJECTION, SOLUTION INTRAVENOUS; SUBCUTANEOUS at 21:50

## 2017-12-05 RX ADMIN — PROBIOTIC PRODUCT - TAB SCH MG: TAB at 10:15

## 2017-12-05 RX ADMIN — OMEGA-3-ACID ETHYL ESTERS SCH GM: 900 CAPSULE ORAL at 15:05

## 2017-12-05 RX ADMIN — FAMOTIDINE SCH MG: 20 TABLET, FILM COATED ORAL at 09:52

## 2017-12-05 RX ADMIN — PREDNISONE SCH MG: 20 TABLET ORAL at 09:54

## 2017-12-05 RX ADMIN — MULTIVITAMIN TABLET SCH TAB: TABLET at 09:53

## 2017-12-05 RX ADMIN — HYDROXYZINE HYDROCHLORIDE PRN MG: 10 TABLET, FILM COATED ORAL at 17:05

## 2017-12-05 NOTE — PDOC CONSULTATION
Consultation


Consult Date: 17


Attending physician:: MELISSA WOOTEN


Consult reason:: I was asked by Dr. Wooten to see this patient because of 

uremic symptoms.





History of Present Illness


Admission Date/PCP: 


  17 23:28





  





History of Present Illness: 


ZAKIA GOODWIN is a 67 year old male with past medical history of 

polycystic kidney disease, hypertension, end-stage renal disease not yet on 

hemodialysis being evaluated for kidney transplantation, left ear basal cell 

carcinoma who presents to the emergency department with complaints of lower 

extremity rash.  Patient reports that he had a small what he thought was a bite 

on his ankle approximately 2 days after being in the woods moving deer stands.  

He reported put Neosporin on it and 2 days later it was 1 from the size of a 

dime to the size of 11.  He denied any central clearing of this lesion.  He 

denied any eschar.  Patient reports that he was then started on doxycycline by 

his primary care provider and subsequently did not improve.  He reported the 

emergency department here and his doxycycline was stopped and he was started on 

clindamycin and Atarax.  He reports that the rash is intensely pruritic.  He 

has a area on the medial aspect of his left ankle that is swollen and 

erythematous for which patient is to be admitted for IV antibiotics.





Patient was sent to my office by Dr. Wooten after he had seen him in his 

office yesterday due to above symptoms and increased swelling.  Patient related 

to me that his rash has just become progressive to involve both lower 

extremities upper extremities his chest and part of the abdomen.  He has also  

increased  lower extremity swelling and upper extremity swelling especially for 

the last 2 days.  He complains of shortness of breath, increasing fatigue, 

decreased appetite, slight nausea but denies vomiting.  He denies any new 

medications over-the-counter.  I have been preparing the patient for initiation 

of hemodialysis due to his progressive worsening kidney function for the last 

few months.  In fact he is a scheduled for preop today for AV fistula placement 

by Dr. Son.  However when I saw him yesterday, considering all the above 

symptoms which could be a combination of both uremic symptoms and the 

appearance of his rash, I advised the patient that I think it is time for 

initiation of renal replacement therapy in the form of hemodialysis.  Patient 

agreed.  I then sent him to the emergency room and spoke to the emergency room 

doctor yesterday.





Today the patient said he felt like the swelling is slightly better with 

decreased redness on his upper and lower extremities after initiation of IV 

antibiotics.  However he is still significantly swollen.  I requested consult 

with Dr. Sno to put a trialysis catheter for initiation of hemodialysis 

which he had just placed this afternoon.








Past Medical History


Cardiac Medical History: Reports: Hyperlipidemia, Hypertension-primary


Neurological Medical History: Reports: Other - TIA


Endocrine Medical History: Reports: Obesity


Renal/ Medical History: Reports: End Stage Renal Disease, Hyperkalemia, 

Hypernatremia, Hyperphosphatemia, Metabolic Acidosis, Proteinuria, Secondary 

Hyperparathyroidism, Other - Polycystic kidney disease


Malignancy Medical History: Reports: Skin Cancer - Left ear basal cell carcinoma


GI Medical History: Reports: Other - Diverticulosis


Musculoskeltal Medical History: Reports: Arthritis - RIGHT HAND, Other - Spinal 

stenosis


Hematology Medical History: Reports Anemia of Chronic Kidney Disease, Reports 

Iron Deficiency Anemia





Past Surgical History


Past Surgical History: Reports: Orthopedic Surgery - Back surgery





Social History


Information Source: Patient


Occupation: 





Retired


Lives with: Alone


Smoking Status: Former Smoker


Cigarettes Packs Per Day: 1.5


Number of Years Smokin


Frequency of Alcohol Use: Rare


Hx Recreational Drug Use: No


Hx Prescription Drug Abuse: No





- Advance Directive


Resuscitation Status: Do Not Resuscitate





Family History


Family History: CAD - Parents and brother, CVA - Monitor, DM - Brother and 

father


Parental Family History Reviewed: Yes


Children Family History Reviewed: Yes


Sibling(s) Family History Reviewed.: Yes





Medication/Allergy


Home Medications: 








Amlodipine Besylate [Norvasc 10 mg Tablet] 10 mg PO DAILY 17 


Aspirin [Ecotrin 81 mg EC Tablet] 81 mg PO DAILY 17 


Calcitriol [Rocaltrol 0.25 mcg Capsule] 0.25 mcg PO .CLARIFY 17 


Carvedilol [Coreg 6.25 mg Tablet] 6.25 mg PO Q12 17 


Doxycycline Hyclate [Vibramycin 100 mg Tablet] 100 mg PO Q12 17 


Ferrous Sulfate [Iron] 325 mg PO DAILY 17 


Fexofenadine/Pseudoephedrine [Allegra-D 24 Hour Tablet] 2 tab PO DAILY 17 


Furosemide [Lasix 20 mg Tablet] 20 mg PO DAILY 17 


L.acidoph,Paracasei, B.lactis [Probiotic] 1 cap PO DAILY 17 


Lysine 1 tab PO DAILY 17 


Multivit-Min/FA/Lycopen/Lutein [Centrum Silver Men Tablet] 1 tab PO DAILY  


Omega-3 Fatty Acids/Fish Oil [Omega 3 Fish Oil Softgel] 1 cap PO DAILY 17 








Allergies/Adverse Reactions: 


 





Penicillins Allergy (Verified 17 17:12)


 RASH











Review of Systems


All systems: reviewed and no additional remarkable complaints except as stated


Review of Systems: 





Constitutional: ABSENT: chills, fever(s), headache(s), weight loss; admits 

fatigue and increased weight gain due to fluid retention


Eyes: ABSENT: visual disturbances


Ears: ABSENT: hearing changes


Cardiovascular: ABSENT: chest pain, dyspnea on exertion, orthropnea, 

palpitations; admits upper extremity edema and lower extremity edema


Respiratory: ABSENT: cough, dyspnea, hemoptysis


Gastrointestinal: ABSENT: abdominal pain, constipation, diarrhea, hematemesis, 

hematochezia, vomiting; admits nausea


Genitourinary: ABSENT: dysuria, hematuria


Musculoskeletal: ABSENT: joint swelling


Integumentary: ABSENT: Wounds; admits diffuse rash with pruritus


Neurological: ABSENT: abnormal gait, abnormal speech, confusion, dizziness, 

focal weakness, numbness, syncope


Psychiatric: ABSENT: anxiety, depression


Endocrine: ABSENT: cold intolerance, heat intolerance, polydipsia, polyuria


Hematologic/Lymphatic: ABSENT: easy bleeding, easy bruising, lymphadenopathy





Physical Exam


Vital Signs: 


 











Temp Pulse Resp BP Pulse Ox


 


 98.1 F   100   18   169/69 H  98 


 


 17 14:34  17 14:34  17 14:34  17 14:34  17 14:34








 Intake & Output











 17





 06:59 06:59 06:59


 


Weight   133.8 kg











Exam: 





General appearance: no acute distress, cooperative, well-developed, well-

nourished


Head exam: PRESENT: atraumatic, normocephalic


Eye exam: PRESENT: Conjunctiva Pink, EOMI, PERRLA.  ABSENT: conjunctival 

injection, scleral icterus


Mouth exam: PRESENT: moist, neck supple, tongue midline


Neck exam: PRESENT: full ROM.  ABSENT: carotid bruit, JVD, lymphadenopathy, 

thyromegaly


Respiratory exam: PRESENT: clear to auscultation bilaterally.  ABSENT: rales, 

rhonchi, stridor, wheezes


Cardiovascular exam: PRESENT: RRR, +S1, +S2.  ABSENT: systolic murmur


Pulses: PRESENT: normal radial pulses, normal dorsalis pedis pulses


GI/Abdominal exam: PRESENT: normal bowel sounds, soft.  ABSENT: guarding, mass, 

tenderness


Rectal exam: deferred


Extremities exam: PRESENT: full ROM.  ABSENT: calf tenderness, pedal edema


Musculoskeletal: PRESENT: full ROM.  ABSENT: deformity


Neurological exam: PRESENT: alert, Awake, Oriented to person, Oriented to place

, Oriented to time, reflexes normal, CN II-XII grossly intact.  ABSENT: motor 

sensory deficit


Psychiatric exam: PRESENT: appropriate affect, normal mood.  ABSENT: homicidal 

ideation, suicidal ideation


Skin exam: PRESENT: intact, dry, warm.  He has diffuse erythematous rash on his 

lower extremities, upper extremities, chest and upper abdomen there is 

significant warmth over this lesions, there seems to be also some petechial 

rash diffusely as well.





Results


Laboratory Results: 





Microbiology











 17 19:45 Urine Culture - Preliminary





 Clean Catch Midstream    NO GROWTH IN 1 DAY








Laboratory











  17





  19:45 19:45 19:45


 


WBC  9.1  


 


RBC  3.41 L  


 


Hgb  11.0 L  


 


Hct  32.2 L  


 


MCV  95  


 


MCH  32.4  


 


MCHC  34.2  


 


RDW  13.9  


 


Plt Count  318  


 


Seg Neutrophils %  60.8  


 


Lymphocytes %  15.6  


 


Monocytes %  7.1  


 


Eosinophils %  16.1 H  


 


Basophils %  0.4  


 


Absolute Neutrophils  5.5  


 


Absolute Lymphocytes  1.4  


 


Absolute Monocytes  0.6  


 


Absolute Eosinophils  1.5 H  


 


Absolute Basophils  0.0  


 


VBG pH   


 


VBG pCO2   


 


VBG HCO3   


 


VBG Base Excess   


 


Sodium   143.2 


 


Potassium   4.7 


 


Chloride   109 H 


 


Carbon Dioxide   17 L 


 


Anion Gap   17 


 


BUN   53 H 


 


Creatinine   5.16 H 


 


Est GFR ( Amer)   14 L 


 


Est GFR (Non-Af Amer)   11 L 


 


Glucose   97 


 


Lactic Acid    1.6


 


Calcium   8.9 


 


Total Bilirubin   0.3 


 


Direct Bilirubin   0.3 


 


Neonat Total Bilirubin   Not Reportable 


 


Neonat Direct Bilirubin   Not Reportable 


 


Neonat Indirect Bili   Not Reportable 


 


AST   15 L 


 


ALT   25 


 


Alkaline Phosphatase   73 


 


Total Protein   6.5 


 


Albumin   4.2 


 


Urine Color   


 


Urine Appearance   


 


Urine pH   


 


Ur Specific Gravity   


 


Urine Protein   


 


Urine Glucose (UA)   


 


Urine Ketones   


 


Urine Blood   


 


Urine Nitrite   


 


Urine Bilirubin   


 


Urine Urobilinogen   


 


Ur Leukocyte Esterase   


 


Urine WBC   


 


Amorphous Sediment   


 


Urine Mucus   


 


Urine Ascorbic Acid   














  17





  19:45 19:45


 


WBC  


 


RBC  


 


Hgb  


 


Hct  


 


MCV  


 


MCH  


 


MCHC  


 


RDW  


 


Plt Count  


 


Seg Neutrophils %  


 


Lymphocytes %  


 


Monocytes %  


 


Eosinophils %  


 


Basophils %  


 


Absolute Neutrophils  


 


Absolute Lymphocytes  


 


Absolute Monocytes  


 


Absolute Eosinophils  


 


Absolute Basophils  


 


VBG pH  7.34 


 


VBG pCO2  37.9 


 


VBG HCO3  19.9 L 


 


VBG Base Excess  -5.3 


 


Sodium  


 


Potassium  


 


Chloride  


 


Carbon Dioxide  


 


Anion Gap  


 


BUN  


 


Creatinine  


 


Est GFR ( Amer)  


 


Est GFR (Non-Af Amer)  


 


Glucose  


 


Lactic Acid  


 


Calcium  


 


Total Bilirubin  


 


Direct Bilirubin  


 


Neonat Total Bilirubin  


 


Neonat Direct Bilirubin  


 


Neonat Indirect Bili  


 


AST  


 


ALT  


 


Alkaline Phosphatase  


 


Total Protein  


 


Albumin  


 


Urine Color   STRAW


 


Urine Appearance   CLEAR


 


Urine pH   5.0


 


Ur Specific Gravity   1.013


 


Urine Protein   30 H


 


Urine Glucose (UA)   NEGATIVE


 


Urine Ketones   NEGATIVE


 


Urine Blood   NEGATIVE


 


Urine Nitrite   NEGATIVE


 


Urine Bilirubin   NEGATIVE


 


Urine Urobilinogen   NEGATIVE


 


Ur Leukocyte Esterase   NEGATIVE


 


Urine WBC   0-1


 


Amorphous Sediment   TRACE


 


Urine Mucus   TRACE


 


Urine Ascorbic Acid   20 H














Assessment & Plan





- Diagnosis


(1) Uremia, acute


Is this a current diagnosis for this admission?: Yes   


Plan: 


Patient presents with uremic symptoms aside from his diffuse skin rash.  I 

discussed with patient the need for initiation of renal replacement therapy 

which he agreed.  We will initiate renal replacement therapy in the form of 

hemodialysis using a trialysis catheter tomorrow.  Once the patient's rash is 

improved Donovan of plan to have vascular surgeon, Dr. Son placed PermCath 

for chronic dialysis treatment.





We will ask for discharge planner consultation to arrange for outpatient 

hemodialysis once the patient is discharged.








(2) End stage renal disease


Is this a current diagnosis for this admission?: Yes   


Plan: 


Progressively worsening for the last few months and currently now uremic.  This 

is due to polycystic kidney disease.  As above we will initiate hemodialysis.  

For long-term patient will continue evaluation for kidney transplantation.








(3) Anemia in chronic kidney disease (CKD)


Is this a current diagnosis for this admission?: Yes   


Plan: 


We will check iron panel.








(4) Hypertension


Qualifiers: 


   Hypertension type: essential hypertension   Qualified Code(s): I10 - 

Essential (primary) hypertension   


Is this a current diagnosis for this admission?: Yes   


Plan: 


Resume all home blood pressure medications including diuretics.








(5) Polycystic kidney disease


Is this a current diagnosis for this admission?: Yes   





(6) Cellulitis


Qualifiers: 


   Site of cellulitis: extremity   Site of cellulitis of extremity: lower 

extremity   Laterality: left   Qualified Code(s): L03.116 - Cellulitis of left 

lower limb   


Is this a current diagnosis for this admission?: Yes   


Plan: 


Patient needs IV antibiotics as initiated per primary service.








- Notes


Notes: 





Assessment and plan discussed with patient.  This is also discussed with Dr. Wooten.


I will follow the patient with you.  Thank you very much for this consultation.





- Time


Time Spent: Greater than 70 Minutes

## 2017-12-05 NOTE — PDOC H&P
History of Present Illness


Admission Date/PCP: 


  17 23:28





  





History of Present Illness: 


ZAKIA GOODWIN is a 67 year old male with past medical history of 

polycystic kidney disease, hypertension, end-stage renal disease not yet on 

hemodialysis, left ear basal cell carcinoma who presents to the emergency 

department with complaints of lower extremity rash.  Patient reports that he 

had a small what he thought was a bite on his ankle approximately 2 days after 

being in the woods moving deer [place].  He reported put Neosporin on it and 2 

days later it was 1 from the size of a dime to the size of 11.  He denied any 

central clearing of this lesion.  He denied any eschar.  Patient reports that 

he was then started on doxycycline by his primary care provider and 

subsequently did not improve.  He reported the emergency department here and 

his doxycycline was stopped and he was started on clindamycin and Atarax.  He 

reports that the rash is intensely pruritic.  He has a area on the medial 

aspect of his left ankle that is swollen and erythematous for which patient is 

to be admitted for IV antibiotics.








Past Medical History


Cardiac Medical History: Reports: Hyperlipidema, Hypertension


   Denies: Coronary Artery Disease, Myocardial Infarction


Pulmonary Medical History: 


   Denies: Asthma, Bronchitis, Chronic Obstructive Pulmonary Disease (COPD), 

Pneumonia


Neurological Medical History: 


   Denies: Seizures


Endocrine Medical History: Reports: Obesity


Renal/ Medical History: Reports: Chronic Kidney Disease, End Stage Renal 

Disease, Other - Polycystic kidney disease


GI Medical History: 


   Denies: Hepatitis, Hiatal Hernia


Musculoskeltal Medical History: Reports: Arthritis - RIGHT HAND


Hematology: Reports: Anemia


   Denies: Sickle Cell Disease





Past Surgical History


Past Surgical History: Reports: Orthopedic Surgery - Back surgery


   Denies: Pacemaker





Social History


Smoking Status: Former Smoker


Cigarettes Packs Per Day: 1.5


Number of Years Smokin


Frequency of Alcohol Use: None


Hx Recreational Drug Use: No


Hx Prescription Drug Abuse: No





- Advance Directive


Resuscitation Status: Do Not Resuscitate


Surrogate healthcare decision maker:: 





Zuleyka Green, sister





Family History


Family History: CAD, DM


Parental Family History Reviewed: Yes


Children Family History Reviewed: Yes


Sibling(s) Family History Reviewed.: Yes





Medication/Allergy


Home Medications: 








Amlodipine Besylate 1 tab PO DAILY 17 


Aspirin [Ecotrin] 1 tab PO DAILY 17 


Calcitriol 1 tab PO ASDIR PRN 17 


Carvedilol [Coreg] 1 tab PO BID 17 


Fexofenadine/Pseudoephedrine [Allegra-D 24 Hour Tablet] 1 tab PO DAILY 17 


Furosemide 1 tab PO DAILY 17 


Iron,Carb/Vit C/Vit B12/Folic [Iron 100 Plus Tablet] 1 tab PO BID 17 


L.acidoph,Paracasei, B.lactis [Probiotic] 1 tab PO DAILY 17 


Lysine 1 tab PO DAILY 17 


Multivit-Min/FA/Lycopen/Lutein [Centrum Silver Men Tablet] 1 tab PO DAILY  


Omega-3 Fatty Acids/Fish Oil [Fish Oil 1,000 mg Capsule] 1 tab PO DAILY  


Simvastatin 1 tab PO BID 17 


Sodium Polystyrene Sulfonate [Kalexate] 1 packet PO ASDIR PRN 17 


Clindamycin HCl 300 mg PO Q8H #21 capsule 17 


Hydroxyzine HCl [Atarax 10 mg Tablet] 10 mg PO TID PRN #30 tablet 17 








Allergies/Adverse Reactions: 


 





Penicillins Allergy (Verified 17 17:12)


 RASH











Review of Systems


Constitutional: ABSENT: chills, fever(s), headache(s), weight gain, weight loss


Eyes: ABSENT: visual disturbances


Ears: ABSENT: hearing changes


Cardiovascular: ABSENT: chest pain, dyspnea on exertion, edema, orthropnea, 

palpitations


Respiratory: ABSENT: cough, hemoptysis


Gastrointestinal: ABSENT: abdominal pain, constipation, diarrhea, hematemesis, 

hematochezia, nausea, vomiting


Genitourinary: ABSENT: dysuria, hematuria


Musculoskeletal: ABSENT: joint swelling


Integumentary: PRESENT: as per HPI, pruritus, rash.  ABSENT: wounds


Neurological: ABSENT: abnormal gait, abnormal speech, confusion, dizziness, 

focal weakness, syncope


Psychiatric: ABSENT: anxiety, depression, homidical ideation, suicidal ideation


Endocrine: ABSENT: cold intolerance, heat intolerance, polydipsia, polyuria


Hematologic/Lymphatic: ABSENT: easy bleeding, easy bruising





Physical Exam


Vital Signs: 


 











Temp Pulse Resp BP Pulse Ox


 


 98.2 F   105 H  17   150/95 H  100 


 


 17 22:30  17 17:16  17 01:31  17 01:31  12/05/17 01:31











General appearance: PRESENT: no acute distress, morbidly obese, well-developed, 

well-nourished


Head exam: PRESENT: atraumatic, normocephalic


Eye exam: PRESENT: conjunctiva pink, EOMI, PERRLA.  ABSENT: scleral icterus


Ear exam: PRESENT: normal external ear exam


Mouth exam: PRESENT: moist, tongue midline


Neck exam: ABSENT: JVD, lymphadenopathy, thyromegaly, tracheal deviation


Respiratory exam: PRESENT: clear to auscultation tasha.  ABSENT: rales, rhonchi, 

wheezes


Cardiovascular exam: PRESENT: RRR, +S1, +S2.  ABSENT: diastolic murmur, rubs, 

systolic murmur


Pulses: PRESENT: normal dorsalis pedis pul


Vascular exam: PRESENT: normal capillary refill


GI/Abdominal exam: PRESENT: normal bowel sounds, soft.  ABSENT: distended, 

guarding, mass, organolmegaly, rebound, tenderness


Rectal exam: PRESENT: deferred


Extremities exam: PRESENT: full ROM, +1 edema.  ABSENT: calf tenderness, 

clubbing, pedal edema


Neurological exam: PRESENT: alert, awake, oriented to person, oriented to place

, oriented to time, oriented to situation, CN II-XII grossly intact.  ABSENT: 

motor sensory deficit


Psychiatric exam: PRESENT: appropriate affect, normal mood.  ABSENT: homicidal 

ideation, suicidal ideation


Skin exam: PRESENT: dry, rash - 15 cm area of erythema and induration on the 

medial aspect of his left malleolus, diffuse maculopapular and in some areas 

petechial rash on his bilateral lower extremities, trunk, and medial aspect of 

his right forearm, warm.  ABSENT: cyanosis





Results


Laboratory Results: 





 











  17





  19:45 19:45 19:45


 


WBC  9.1  


 


Hgb  11.0 L  


 


Eosinophils %  16.1 H  


 


Carbon Dioxide   17 L 


 


BUN   53 H 


 


Creatinine   5.16 H 


 


Lactic Acid    1.6








Status: Imported from PACS





Assessment & Plan





- Diagnosis


(1) Cellulitis of left lower extremity


Is this a current diagnosis for this admission?: Yes   


Plan: 


Place patient on vancomycin and Rocephin pending cultures.








(2) Urticarial rash


Is this a current diagnosis for this admission?: Yes   


Plan: 


Initiate patient on 40 mg of prednisone p.o. daily.  He does report adverse 

reactions in the past as a young man to steroids.








(3) Polycystic kidney disease


Is this a current diagnosis for this admission?: Yes   


Plan: 


Will consult his nephrologist as there were apparently preemptive plans to 

place a dialysis catheter








(4) End stage renal disease


Is this a current diagnosis for this admission?: Yes   


Plan: 


Will consult his nephrologist as there were apparently preemptive plans to 

place a dialysis catheter








(5) Hypertension


Qualifiers: 


   Hypertension type: essential hypertension   Qualified Code(s): I10 - 

Essential (primary) hypertension   


Is this a current diagnosis for this admission?: Yes   


Plan: 


On Norvasc and Coreg








(6) Severe obesity


Is this a current diagnosis for this admission?: Yes   





- Time


Time Spent: 50 to 70 Minutes


Medications reviewed and adjusted accordingly: Yes


Anticipated discharge: Home


Within: within 48 hours





- Inpatient Certification


Based on my medical assessment, after consideration of the patient's 

comorbidities, presenting symptoms, or acuity I expect that the services needed 

warrant INPATIENT care.: No


I certify that my determination is in accordance with my understanding of 

Medicare's requirements for reasonable and necessary INPATIENT services [42 CFR 

412.3e].: No


Medical Necessity: Need for IV Antibiotics


Post Hospital Care: D/C Planner Documentation

## 2017-12-05 NOTE — PHYSICIAN ADVISORY NOTE
Physician Advisor ProgressNote


.: 


[facility], I have reviewed the medical record 

for this patient.  





Physician Advisor Statement: 





Please consider documentin.  Medical necessity:  please specify the ongoing clinical issues requiring 

continued hospitalization tonight iin this Medicare pt, such as  


      "cellulitis failed outpt tx with doxy, & continues not sufficiently 

improved for safe d/c, w/continued tachycardia, ..."


      - if that is accurate, & documented per attending, appropriate to change 

to Inpatient status.








CK

## 2017-12-05 NOTE — OPERATIVE REPORT
Operative Report


DATE OF SURGERY: 12/05/17


PREOPERATIVE DIAGNOSIS: End-stage renal disease.


POSTOPERATIVE DIAGNOSIS: End-stage renal disease.


OPERATION: 1.  Ultrasound evaluation of the right femoral vein.  2.  Insertion 

of temporary hemodialysis catheter via real-time ultrasound access in the right 

femoral vein.


SURGEON: LENNOX WILLIAMS 1ST ASSISTANT: None


TISSUE REMOVED OR ALTERED: Not applicable.


COMPLICATIONS: 





None.


ESTIMATED BLOOD LOSS: 5 mL.


INTRAOPERATIVE FINDINGS: Of a satisfactory right femoral vein.  Ultrasound of 

significant benefit in this patient with a thick layer of adiposity.  

Satisfactory placement with easy egress of blood and ingress of heparinized 

solution.


PROCEDURE: 








After obtaining informed consent, the patient was positioned supine at bedside.





The[ left groin] and adjacent areas were prepared with chlorhexidine and draped 

out with sterile linen.





After the universal timeout the procedure commenced.  A steriley sheathed 

ultrasound probe was used to evaluate the right femoral vein].  Local 

anesthesia was infiltrated adjacent to the probe.  Access into the right 

femoral was accomplished using a micropuncture needle followed, by 

micropuncture wire and then with a micropuncture catheter.  This was followed 

by introduction of a 0.035 guidewire, the skin opening was enlarged slightly, 

serially larger dilators were now placed followed by introduction of a triaysis

  catheter.  All of these transitions were smooth.  Each lumen was aspirated of 

blood and irrigated with heparinized solution.  The catheter was now sutured to 

the skin using 3-0 nylon.  A Bio A patch was now applied, followed by sterile 

dressings.  Caps were placed on the end of the each of the lumens.  The 

procedure concluded.





Copies dictated operative report to Dr. Lennox Williams MD.

## 2017-12-05 NOTE — PDOC PROGRESS REPORT
Subjective


Progress Note for:: 12/05/17


Subjective:: 





The patient states to feel the same.  He still has a diffuse rash on his lower 

extremities.


He states that her hydroxyzine seem to be helping with itching.


He is presently awaiting for a PermCath placement for an emergency dialysis to 

be started.


Reason For Visit: 


CELLULITIS








Physical Exam


Vital Signs: 


 











Temp Pulse Resp BP Pulse Ox


 


 98.9 F   105 H  19   156/82 H  100 


 


 12/05/17 07:00  12/04/17 17:16  12/05/17 10:01  12/05/17 10:01  12/05/17 10:01











General appearance: PRESENT: mild distress


Head exam: PRESENT: atraumatic


Eye exam: PRESENT: conjunctival injection


Neck exam: ABSENT: carotid bruit, JVD


Respiratory exam: PRESENT: crackles


Cardiovascular exam: PRESENT: RRR, +S1, +S2


Pulses: PRESENT: +1 pedal pulses bilateral


GI/Abdominal exam: PRESENT: normal bowel sounds, soft


Extremities exam: PRESENT: pedal edema, tenderness, other


Musculoskeletal exam: PRESENT: full ROM


Neurological exam: PRESENT: alert, awake


Skin exam: PRESENT: rash


Additional comments: 





Bilateral lower extremities and extending all the way up to the chest





Assessment & Plan





- Diagnosis


(1) End stage renal disease


Is this a current diagnosis for this admission?: Yes   


Plan: 


An acute worsening of the end-stage renal disease.  The patient will require a 

permacath and an emergency dialysis








(2) Hypertension


Qualifiers: 


   Hypertension type: essential hypertension   Qualified Code(s): I10 - 

Essential (primary) hypertension   


Is this a current diagnosis for this admission?: Yes   


Plan: 


Continue current medications








(3) Polycystic kidney disease


Is this a current diagnosis for this admission?: Yes   


Plan: 


Patient is in acute on chronic renal failure








(4) Cellulitis


Qualifiers: 


   Site of cellulitis: extremity   Site of cellulitis of extremity: lower 

extremity   Qualified Code(s): L03.116 - Cellulitis of left lower limb   


Is this a current diagnosis for this admission?: Yes   


Plan: 


The patient has failed outpatient treatment.  He is requiring 2 IV antibiotics.








(5) Urticarial rash


Is this a current diagnosis for this admission?: Yes   


Plan: 


Possibly related to an acute on chronic renal failure and azothemia

## 2017-12-06 LAB
ANION GAP SERPL CALC-SCNC: 17 MMOL/L (ref 5–19)
BUN SERPL-MCNC: 57 MG/DL (ref 7–20)
CALCIUM: 9.1 MG/DL (ref 8.4–10.2)
CHLORIDE SERPL-SCNC: 111 MMOL/L (ref 98–107)
CO2 SERPL-SCNC: 16 MMOL/L (ref 22–30)
CREAT SERPL-MCNC: 5.23 MG/DL (ref 0.52–1.25)
ERYTHROCYTE [DISTWIDTH] IN BLOOD BY AUTOMATED COUNT: 14.1 % (ref 11.5–14)
FERRITIN SERPL-MCNC: 64.3 NG/ML (ref 17.9–464)
FOLATE SERPL-MCNC: 18.8 NG/ML (ref 2.76–?)
GLUCOSE SERPL-MCNC: 133 MG/DL (ref 75–110)
HCT VFR BLD CALC: 27 % (ref 37.9–51)
HGB BLD-MCNC: 9.2 G/DL (ref 13.5–17)
HGB HCT DIFFERENCE: 0.6
MCH RBC QN AUTO: 32 PG (ref 27–33.4)
MCHC RBC AUTO-ENTMCNC: 34 G/DL (ref 32–36)
MCV RBC AUTO: 94 FL (ref 80–97)
PHOSPHATE SERPL-MCNC: 4.7 MG/DL (ref 2.5–4.5)
POTASSIUM SERPL-SCNC: 4.2 MMOL/L (ref 3.6–5)
RBC # BLD AUTO: 2.87 10^6/UL (ref 4.35–5.55)
SODIUM SERPL-SCNC: 144.3 MMOL/L (ref 137–145)
VIT B12 SERPL-MCNC: 493 PG/ML (ref 239–931)
WBC # BLD AUTO: 10.4 10^3/UL (ref 4–10.5)

## 2017-12-06 PROCEDURE — 5A1D70Z PERFORMANCE OF URINARY FILTRATION, INTERMITTENT, LESS THAN 6 HOURS PER DAY: ICD-10-PCS | Performed by: INTERNAL MEDICINE

## 2017-12-06 RX ADMIN — MULTIVITAMIN TABLET SCH TAB: TABLET at 11:31

## 2017-12-06 RX ADMIN — HYDROXYZINE HYDROCHLORIDE PRN MG: 10 TABLET, FILM COATED ORAL at 07:28

## 2017-12-06 RX ADMIN — CEFTRIAXONE SCH ML: 1 INJECTION, SOLUTION INTRAVENOUS at 11:32

## 2017-12-06 RX ADMIN — FUROSEMIDE SCH MG: 20 TABLET ORAL at 11:30

## 2017-12-06 RX ADMIN — Medication SCH UNIT: at 05:41

## 2017-12-06 RX ADMIN — HEPARIN SODIUM SCH UNIT: 5000 INJECTION, SOLUTION INTRAVENOUS; SUBCUTANEOUS at 14:45

## 2017-12-06 RX ADMIN — OMEGA-3-ACID ETHYL ESTERS SCH GM: 900 CAPSULE ORAL at 07:29

## 2017-12-06 RX ADMIN — FAMOTIDINE SCH MG: 20 TABLET, FILM COATED ORAL at 11:30

## 2017-12-06 RX ADMIN — HEPARIN SODIUM SCH UNIT: 5000 INJECTION, SOLUTION INTRAVENOUS; SUBCUTANEOUS at 05:40

## 2017-12-06 RX ADMIN — ASPIRIN SCH MG: 81 TABLET, COATED ORAL at 11:31

## 2017-12-06 RX ADMIN — PREDNISONE SCH MG: 20 TABLET ORAL at 11:31

## 2017-12-06 RX ADMIN — CARVEDILOL SCH MG: 6.25 TABLET, FILM COATED ORAL at 17:40

## 2017-12-06 RX ADMIN — VANCOMYCIN HYDROCHLORIDE SCH MG: 1 INJECTION, POWDER, LYOPHILIZED, FOR SOLUTION INTRAVENOUS at 17:39

## 2017-12-06 RX ADMIN — PROBIOTIC PRODUCT - TAB SCH MG: TAB at 11:31

## 2017-12-06 RX ADMIN — ASCORBIC ACID, FOLIC ACID, NIACIN, THIAMINE, RIBOFLAVIN, PYRIDOXINE, CYANOCOBALAMIN, PANTOTHENIC ACID, BIOTIN SCH CAP: 100; 1.5; 1.7; 20; 10; 1; 6; 150; 5 CAPSULE, LIQUID FILLED ORAL at 17:39

## 2017-12-06 RX ADMIN — Medication SCH UNIT: at 14:45

## 2017-12-06 RX ADMIN — CARVEDILOL SCH MG: 6.25 TABLET, FILM COATED ORAL at 11:29

## 2017-12-06 RX ADMIN — Medication SCH ML: at 05:40

## 2017-12-06 RX ADMIN — Medication SCH ML: at 14:45

## 2017-12-06 RX ADMIN — HYDROXYZINE HYDROCHLORIDE PRN MG: 10 TABLET, FILM COATED ORAL at 14:54

## 2017-12-06 RX ADMIN — AMLODIPINE BESYLATE SCH MG: 10 TABLET ORAL at 11:31

## 2017-12-06 NOTE — PDOC PROGRESS REPORT
Subjective


Progress Note for:: 12/06/17


Subjective:: 





I saw the patient during dialysis at around 8:30 AM this morning.  Patient was 

tolerating dialysis without any problems or complaints.  His blood pressure was 

initially slightly elevated though.  He feels like his rashes and swelling are 

getting better slowly.  He did slept well last night.  He denies any other 

complaints.


Reason For Visit: 


CELLULITIS








Physical Exam


Vital Signs: 


 











Temp Pulse Resp BP Pulse Ox


 


 98.1 F   95   18   147/72 H  96 


 


 12/06/17 04:14  12/06/17 07:00  12/06/17 04:14  12/06/17 04:14  12/06/17 04:14








 Intake & Output











 12/05/17 12/06/17 12/07/17





 06:59 06:59 06:59


 


Intake Total  700 


 


Balance  700 


 


Weight  134.2 kg 








Vital signs during dialysis: Blood pressure 166/87, heart rate of 80, blood 

flow rate of 260 mL/min, dialysate flow rate of 500 mL/min.


Exam: 





General appearance: PRESENT: no acute distress, cooperative, well-developed, 

well-nourished


Head exam: PRESENT: atraumatic, normocephalic


Eye exam: PRESENT: conjunctiva pale, PERRLA.  ABSENT: scleral icterus


Neck exam: ABSENT: JVD


Respiratory exam: PRESENT: Normal breath sounds.  ABSENT: crackles, rales, 

rhonchi, unlabored, wheezes


Cardiovascular exam: PRESENT: Regular rate rhythm -+S1, +S2.  ABSENT: diastolic 

murmur, systolic murmur


GI/Abdominal exam: PRESENT: normal bowel sounds, soft.  ABSENT: guarding, mass, 

tenderness


Extremities exam: Grade 1 bilateral lower extremity edema and upper extremity 

edema


Neurological exam: PRESENT: alert, awake, oriented to person, place and time.


Skin exam: PRESENT: dry, warm, the erythema and warmth over his lower 

extremities and upper extremities seems to be slowly improving.





Results


Laboratory Results: 


 





 12/06/17 07:37 





 12/06/17 07:37 





 











  12/06/17 12/06/17 12/06/17





  07:37 07:37 07:37


 


WBC  10.4  


 


RBC  2.87 L  


 


Hgb  9.2 L  


 


Hct  27.0 L  


 


MCV  94  


 


MCH  32.0  


 


MCHC  34.0  


 


RDW  14.1 H  


 


Plt Count  255  


 


Retic Count (auto)  1.91  


 


Absolute Retic  0.055  


 


Sodium   144.3 


 


Potassium   4.2 


 


Chloride   111 H 


 


Carbon Dioxide   16 L 


 


Anion Gap   17 


 


BUN   57 H 


 


Creatinine   5.23 H 


 


Est GFR ( Amer)   13 L 


 


Est GFR (Non-Af Amer)   11 L 


 


Glucose   133 H 


 


Calcium   9.1 


 


Phosphorus   4.7 H 


 


Iron   106.5 


 


TIBC   288 


 


% Saturation   37 


 


Ferritin   64.30 


 


Vitamin B12   493.0 


 


Folate   18.80 


 


PTH Intact    175.3 H














Assessment & Plan





- Diagnosis


(1) Uremia, acute


Is this a current diagnosis for this admission?: Yes   


Plan: 


Patient presents with uremic symptoms aside from his diffuse skin rash.  I 

discussed with patient the need for initiation of renal replacement therapy 

which he agreed.  We will initiate renal replacement therapy in the form of 

hemodialysis using a trialysis catheter tomorrow.  Once the patient's rash is 

improved we will plan to have vascular surgeon, Dr. Son placed PermCath 

for chronic dialysis treatment.





We will ask for discharge planner consultation to arrange for outpatient 

hemodialysis once the patient is discharged.





We did dialysis today for 2.5 hours, using the patient's right trialysis 

catheter, with 2 potassium bath, blood flow rate of 250 mL per minute, 

dialysate flow rate of 500 mL per minute, ultrafiltration 2 L as tolerated, no 

heparin and no Procrit during dialysis.


We will plan to do next dialysis on Friday.








(2) End stage renal disease


Is this a current diagnosis for this admission?: Yes   


Plan: 


Progressively worsening for the last few months and currently now uremic.  This 

is due to polycystic kidney disease.  As above we will initiate hemodialysis.  

Patient is actually on the [facility] kidney transplant list and potential living 

donors are being evaluated.








(3) Polycystic kidney disease


Is this a current diagnosis for this admission?: Yes   





(4) Anemia in chronic kidney disease (CKD)


Is this a current diagnosis for this admission?: Yes   


Plan: 


We will check iron panel.  His ferritin is mildly low but the rest of the iron 

indicis are acceptable.








(5) Hypertension


Qualifiers: 


   Hypertension type: essential hypertension   Qualified Code(s): I10 - 

Essential (primary) hypertension   


Is this a current diagnosis for this admission?: Yes   


Plan: 


Resume all home blood pressure medications including diuretics.








(6) Cellulitis


Qualifiers: 


   Site of cellulitis: extremity   Site of cellulitis of extremity: lower 

extremity   Laterality: left   Qualified Code(s): L03.116 - Cellulitis of left 

lower limb   


Is this a current diagnosis for this admission?: Yes   


Plan: 


Patient needs IV antibiotics as initiated per primary service.  This is 

associated with pruritic rash which could be contributed by acute to uremia.  

Currently clinically improving.








(7) Hyperphosphatemia


Is this a current diagnosis for this admission?: Yes   


Plan: 


Low phosphorus diet








(8) Secondary hyperparathyroidism (of renal origin)


Is this a current diagnosis for this admission?: Yes   


Plan: 


We will give Zemplar IV during dialysis treatment for the successive dialysis 

treatments.








- Time


Time with patient: 15-25 minutes

## 2017-12-06 NOTE — PDOC PROGRESS REPORT
Subjective


Progress Note for:: 12/06/17


Subjective:: 





The patient states to feel much better.  His breathing has improved.  The 

swelling of the lower extremities have improved.  He is presently scheduled for 

hemodialysis.  He had a PermCath placed in his right groin.


The itching has improved slightly


Reason For Visit: 


CELLULITIS








Physical Exam


Vital Signs: 


 











Temp Pulse Resp BP Pulse Ox


 


 98.1 F   95   18   147/72 H  96 


 


 12/06/17 04:14  12/06/17 07:00  12/06/17 04:14  12/06/17 04:14  12/06/17 04:14








 Intake & Output











 12/05/17 12/06/17 12/07/17





 06:59 06:59 06:59


 


Intake Total  700 


 


Balance  700 


 


Weight  134.2 kg 











General appearance: PRESENT: mild distress


Head exam: PRESENT: atraumatic


Eye exam: PRESENT: conjunctiva pink


Neck exam: ABSENT: carotid bruit, JVD


Respiratory exam: PRESENT: crackles


Cardiovascular exam: PRESENT: RRR, +S1, +S2


Pulses: PRESENT: +1 pedal pulses bilateral


GI/Abdominal exam: PRESENT: normal bowel sounds, soft


Extremities exam: PRESENT: full ROM


Musculoskeletal exam: PRESENT: ambulatory


Neurological exam: PRESENT: alert, awake





Assessment & Plan





- Diagnosis


(1) End stage renal disease


Is this a current diagnosis for this admission?: Yes   


Plan: 


PermCath in place.  Proceed with hemodialysis








(2) Hypertension


Qualifiers: 


   Hypertension type: essential hypertension   Qualified Code(s): I10 - 

Essential (primary) hypertension   


Is this a current diagnosis for this admission?: Yes   


Plan: 


Continue current medications








(3) Polycystic kidney disease


Is this a current diagnosis for this admission?: Yes   


Plan: 


Patient is in acute on chronic renal failure








(4) Cellulitis


Qualifiers: 


   Site of cellulitis: extremity   Site of cellulitis of extremity: lower 

extremity   Qualified Code(s): L03.116 - Cellulitis of left lower limb   


Is this a current diagnosis for this admission?: Yes   


Plan: 


The patient has failed outpatient treatment.  He is requiring 2 IV antibiotics.








(5) Urticarial rash


Is this a current diagnosis for this admission?: Yes   


Plan: 


Possibly related to an acute on chronic renal failure and azothemia

## 2017-12-07 LAB — TRANSFERRIN SERPL-MCNC: 234 MG/DL (ref 200–370)

## 2017-12-07 RX ADMIN — HYDROXYZINE HYDROCHLORIDE PRN MG: 10 TABLET, FILM COATED ORAL at 18:13

## 2017-12-07 RX ADMIN — FUROSEMIDE SCH MG: 20 TABLET ORAL at 11:13

## 2017-12-07 RX ADMIN — MULTIVITAMIN TABLET SCH TAB: TABLET at 11:13

## 2017-12-07 RX ADMIN — FAMOTIDINE SCH MG: 20 TABLET, FILM COATED ORAL at 00:58

## 2017-12-07 RX ADMIN — Medication SCH UNIT: at 00:58

## 2017-12-07 RX ADMIN — CARVEDILOL SCH MG: 6.25 TABLET, FILM COATED ORAL at 18:13

## 2017-12-07 RX ADMIN — CEFTRIAXONE SCH ML: 1 INJECTION, SOLUTION INTRAVENOUS at 11:14

## 2017-12-07 RX ADMIN — ASPIRIN SCH MG: 81 TABLET, COATED ORAL at 11:12

## 2017-12-07 RX ADMIN — Medication SCH ML: at 21:50

## 2017-12-07 RX ADMIN — Medication SCH UNIT: at 15:20

## 2017-12-07 RX ADMIN — Medication SCH ML: at 00:58

## 2017-12-07 RX ADMIN — PREDNISONE SCH MG: 20 TABLET ORAL at 11:11

## 2017-12-07 RX ADMIN — HEPARIN SODIUM SCH UNIT: 5000 INJECTION, SOLUTION INTRAVENOUS; SUBCUTANEOUS at 07:15

## 2017-12-07 RX ADMIN — HYDROXYZINE HYDROCHLORIDE PRN MG: 10 TABLET, FILM COATED ORAL at 07:15

## 2017-12-07 RX ADMIN — HEPARIN SODIUM SCH UNIT: 5000 INJECTION, SOLUTION INTRAVENOUS; SUBCUTANEOUS at 00:54

## 2017-12-07 RX ADMIN — Medication SCH UNIT: at 21:50

## 2017-12-07 RX ADMIN — HEPARIN SODIUM SCH UNIT: 5000 INJECTION, SOLUTION INTRAVENOUS; SUBCUTANEOUS at 15:19

## 2017-12-07 RX ADMIN — OMEGA-3-ACID ETHYL ESTERS SCH GM: 900 CAPSULE ORAL at 11:25

## 2017-12-07 RX ADMIN — PROBIOTIC PRODUCT - TAB SCH MG: TAB at 11:12

## 2017-12-07 RX ADMIN — FAMOTIDINE SCH MG: 20 TABLET, FILM COATED ORAL at 11:12

## 2017-12-07 RX ADMIN — ASCORBIC ACID, FOLIC ACID, NIACIN, THIAMINE, RIBOFLAVIN, PYRIDOXINE, CYANOCOBALAMIN, PANTOTHENIC ACID, BIOTIN SCH CAP: 100; 1.5; 1.7; 20; 10; 1; 6; 150; 5 CAPSULE, LIQUID FILLED ORAL at 16:02

## 2017-12-07 RX ADMIN — HEPARIN SODIUM SCH UNIT: 5000 INJECTION, SOLUTION INTRAVENOUS; SUBCUTANEOUS at 21:50

## 2017-12-07 RX ADMIN — Medication SCH ML: at 15:20

## 2017-12-07 RX ADMIN — AMLODIPINE BESYLATE SCH MG: 10 TABLET ORAL at 11:14

## 2017-12-07 RX ADMIN — Medication SCH UNIT: at 07:14

## 2017-12-07 RX ADMIN — CARVEDILOL SCH MG: 6.25 TABLET, FILM COATED ORAL at 11:13

## 2017-12-07 RX ADMIN — FAMOTIDINE SCH MG: 20 TABLET, FILM COATED ORAL at 21:50

## 2017-12-07 RX ADMIN — FERROUS SULFATE TAB 325 MG (65 MG ELEMENTAL FE) SCH MG: 325 (65 FE) TAB at 11:12

## 2017-12-07 RX ADMIN — Medication SCH ML: at 07:17

## 2017-12-07 NOTE — PDOC PROGRESS REPORT
Subjective


Progress Note for:: 12/07/17


Subjective:: 





Patient is doing fine.  His rashes are better.  He is also less swollen.  He 

tolerated his first hemodialysis yesterday.


Reason For Visit: 


Acute uremia








Physical Exam


Vital Signs: 


 











Temp Pulse Resp BP Pulse Ox


 


 97.8 F   88   20   136/70 H  98 


 


 12/07/17 16:31  12/07/17 16:31  12/07/17 16:31  12/07/17 16:31  12/07/17 16:31








 Intake & Output











 12/06/17 12/07/17 12/08/17





 06:59 06:59 06:59


 


Intake Total 700 1044 480


 


Output Total  2700 500


 


Balance 700 -5356 -20


 


Weight 134.2 kg 132.8 kg 











Exam: 





General appearance: PRESENT: no acute distress, cooperative, well-developed, 

well-nourished


Head exam: PRESENT: atraumatic, normocephalic


Eye exam: PRESENT: conjunctiva slightly pale, PERRLA.  ABSENT: scleral icterus


Neck exam: ABSENT: JVD


Respiratory exam: PRESENT: Normal breath sounds.  ABSENT: crackles, rales, 

rhonchi, unlabored, wheezes


Cardiovascular exam: PRESENT: Regular rate rhythm -+S1, +S2.  ABSENT: diastolic 

murmur, systolic murmur


GI/Abdominal exam: PRESENT: normal bowel sounds, soft.  ABSENT: guarding, mass, 

tenderness


Extremities exam: Decreased grade 1 bilateral lower extremity edema and 

bilateral upper extremity edema


Neurological exam: PRESENT: alert, awake, oriented to person, place and time.


Skin exam: PRESENT: dry, warm, decreased erythema over his bilateral lower 

extremities and upper extremities, there are some scaling and sloughing of a 

portion of dry skin in his right inner part of the leg and left foot





Results


Laboratory Results: 


 





 12/06/17 07:37 





 12/06/17 07:37 





 











  12/06/17





  07:37


 


Transferrin  234














Assessment & Plan





- Diagnosis


(1) Uremia, acute


Is this a current diagnosis for this admission?: Yes   


Plan: 


Patient presented with uremic symptoms aside from his diffuse skin rash.  





We will ask for discharge planner consultation to arrange for outpatient 

hemodialysis once the patient is discharged.





We will plan for another hemodialysis treatment tomorrow morning.  We will also 

plan for PermCath placement care of Dr. Son sometime early part of next 

week since the patient's rashes is improving.








(2) End stage renal disease


Is this a current diagnosis for this admission?: Yes   


Plan: 


Progressively worsening for the last few months and currently now uremic.  This 

is due to polycystic kidney disease.  As above we will initiate hemodialysis.  

Patient is actually on the [facility] kidney transplant list and potential living 

donors are being evaluated.








(3) Polycystic kidney disease


Is this a current diagnosis for this admission?: Yes   





(4) Anemia in chronic kidney disease (CKD)


Is this a current diagnosis for this admission?: Yes   


Plan: 


We will check iron panel.  His ferritin is mildly low but the rest of the iron 

indices are acceptable.








(5) Hypertension


Qualifiers: 


   Hypertension type: essential hypertension   Qualified Code(s): I10 - 

Essential (primary) hypertension   


Is this a current diagnosis for this admission?: Yes   


Plan: 


Resume all home blood pressure medications including diuretics.








(6) Cellulitis


Qualifiers: 


   Site of cellulitis: extremity   Site of cellulitis of extremity: lower 

extremity   Laterality: left   Qualified Code(s): L03.116 - Cellulitis of left 

lower limb   


Is this a current diagnosis for this admission?: Yes   


Plan: 


Patient needs IV antibiotics as initiated per primary service.  This is 

associated with pruritic rash which could be contributed by acute to uremia.  

Currently clinically improving.








(7) Hyperphosphatemia


Is this a current diagnosis for this admission?: Yes   


Plan: 


Low phosphorus diet








(8) Secondary hyperparathyroidism (of renal origin)


Is this a current diagnosis for this admission?: Yes   


Plan: 


We will give Zemplar IV during dialysis treatment for the successive dialysis 

treatments.  For now we will continue oral calcitriol.








- Time


Time with patient: 15-25 minutes

## 2017-12-07 NOTE — PDOC PROGRESS REPORT
Subjective


Progress Note for:: 12/07/17


Subjective:: 





The patient states to feel much better.  He tolerated hemodialysis well.  He is 

a negative balance of over 2-1/2 L.


The rash has improved.  The leg swelling has improved


Reason For Visit: 


CELLULITIS








Physical Exam


Vital Signs: 


 











Temp Pulse Resp BP Pulse Ox


 


 98.3 F   79   13   126/72 H  94 


 


 12/07/17 04:21  12/07/17 07:00  12/07/17 04:21  12/07/17 04:21  12/07/17 04:21








 Intake & Output











 12/06/17 12/07/17 12/08/17





 06:59 06:59 06:59


 


Intake Total 700 1044 


 


Output Total  2700 


 


Balance 700 -1656 


 


Weight 134.2 kg 132.8 kg 











General appearance: PRESENT: mild distress


Head exam: PRESENT: atraumatic


Eye exam: PRESENT: conjunctiva pink


Neck exam: ABSENT: carotid bruit, JVD


Respiratory exam: PRESENT: crackles


Cardiovascular exam: PRESENT: RRR, +S1, +S2


Pulses: PRESENT: +1 pedal pulses bilateral


GI/Abdominal exam: PRESENT: normal bowel sounds, soft


Extremities exam: PRESENT: full ROM, pedal edema


Musculoskeletal exam: PRESENT: ambulatory


Neurological exam: PRESENT: alert, awake


Skin exam: PRESENT: rash





Results


Laboratory Results: 


 





 12/06/17 07:37 





 12/06/17 07:37 





 











  12/06/17





  07:37


 


Sodium  144.3


 


Potassium  4.2


 


Chloride  111 H


 


Carbon Dioxide  16 L


 


Anion Gap  17


 


BUN  57 H


 


Creatinine  5.23 H


 


Est GFR ( Amer)  13 L


 


Est GFR (Non-Af Amer)  11 L


 


Glucose  133 H


 


Calcium  9.1


 


Phosphorus  4.7 H


 


Iron  106.5


 


TIBC  288


 


% Saturation  37


 


Ferritin  64.30


 


Vitamin B12  493.0


 


Folate  18.80














Assessment & Plan





- Diagnosis


(1) End stage renal disease


Is this a current diagnosis for this admission?: Yes   


Plan: 


PermCath in place.  Proceed with hemodialysis








(2) Hypertension


Qualifiers: 


   Hypertension type: essential hypertension   Qualified Code(s): I10 - 

Essential (primary) hypertension   


Is this a current diagnosis for this admission?: Yes   


Plan: 


Continue current medications








(3) Polycystic kidney disease


Is this a current diagnosis for this admission?: Yes   


Plan: 


Patient is in acute on chronic renal failure








(4) Cellulitis


Qualifiers: 


   Site of cellulitis: extremity   Site of cellulitis of extremity: lower 

extremity   Qualified Code(s): L03.116 - Cellulitis of left lower limb   


Is this a current diagnosis for this admission?: Yes   


Plan: 


Improving we will continue the antibiotics and reevaluate in the morning








(5) Urticarial rash


Is this a current diagnosis for this admission?: Yes   


Plan: 


Improved continue current medications

## 2017-12-08 LAB
ANION GAP SERPL CALC-SCNC: 12 MMOL/L (ref 5–19)
BASOPHILS # BLD AUTO: 0 10^3/UL (ref 0–0.2)
BASOPHILS NFR BLD AUTO: 0.3 % (ref 0–2)
BUN SERPL-MCNC: 59 MG/DL (ref 7–20)
CALCIUM: 8.6 MG/DL (ref 8.4–10.2)
CHLORIDE SERPL-SCNC: 106 MMOL/L (ref 98–107)
CO2 SERPL-SCNC: 22 MMOL/L (ref 22–30)
CREAT SERPL-MCNC: 4.62 MG/DL (ref 0.52–1.25)
EOSINOPHIL # BLD AUTO: 0.1 10^3/UL (ref 0–0.6)
EOSINOPHIL NFR BLD AUTO: 1.2 % (ref 0–6)
ERYTHROCYTE [DISTWIDTH] IN BLOOD BY AUTOMATED COUNT: 14 % (ref 11.5–14)
GLUCOSE SERPL-MCNC: 105 MG/DL (ref 75–110)
HCT VFR BLD CALC: 24.8 % (ref 37.9–51)
HGB BLD-MCNC: 8.4 G/DL (ref 13.5–17)
HGB HCT DIFFERENCE: 0.4
LYMPHOCYTES # BLD AUTO: 0.9 10^3/UL (ref 0.5–4.7)
LYMPHOCYTES NFR BLD AUTO: 9.8 % (ref 13–45)
MCH RBC QN AUTO: 32 PG (ref 27–33.4)
MCHC RBC AUTO-ENTMCNC: 34 G/DL (ref 32–36)
MCV RBC AUTO: 94 FL (ref 80–97)
MONOCYTES # BLD AUTO: 0.7 10^3/UL (ref 0.1–1.4)
MONOCYTES NFR BLD AUTO: 7.4 % (ref 3–13)
NEUTROPHILS # BLD AUTO: 7.7 10^3/UL (ref 1.7–8.2)
NEUTS SEG NFR BLD AUTO: 81.3 % (ref 42–78)
POTASSIUM SERPL-SCNC: 4.3 MMOL/L (ref 3.6–5)
RBC # BLD AUTO: 2.63 10^6/UL (ref 4.35–5.55)
SODIUM SERPL-SCNC: 140.4 MMOL/L (ref 137–145)
TROUGH DRAW TIME: 510
WBC # BLD AUTO: 9.5 10^3/UL (ref 4–10.5)

## 2017-12-08 PROCEDURE — 5A1D70Z PERFORMANCE OF URINARY FILTRATION, INTERMITTENT, LESS THAN 6 HOURS PER DAY: ICD-10-PCS | Performed by: INTERNAL MEDICINE

## 2017-12-08 RX ADMIN — HEPARIN SODIUM SCH UNIT: 5000 INJECTION, SOLUTION INTRAVENOUS; SUBCUTANEOUS at 23:03

## 2017-12-08 RX ADMIN — ASPIRIN SCH MG: 81 TABLET, COATED ORAL at 12:11

## 2017-12-08 RX ADMIN — Medication SCH UNIT: at 15:18

## 2017-12-08 RX ADMIN — CARVEDILOL SCH MG: 6.25 TABLET, FILM COATED ORAL at 12:11

## 2017-12-08 RX ADMIN — HEPARIN SODIUM SCH UNIT: 5000 INJECTION, SOLUTION INTRAVENOUS; SUBCUTANEOUS at 05:10

## 2017-12-08 RX ADMIN — VANCOMYCIN HYDROCHLORIDE SCH MG: 1 INJECTION, POWDER, LYOPHILIZED, FOR SOLUTION INTRAVENOUS at 18:37

## 2017-12-08 RX ADMIN — Medication SCH ML: at 05:09

## 2017-12-08 RX ADMIN — CARVEDILOL SCH MG: 6.25 TABLET, FILM COATED ORAL at 17:38

## 2017-12-08 RX ADMIN — Medication SCH ML: at 23:03

## 2017-12-08 RX ADMIN — Medication SCH ML: at 15:19

## 2017-12-08 RX ADMIN — OMEGA-3-ACID ETHYL ESTERS SCH GM: 900 CAPSULE ORAL at 12:14

## 2017-12-08 RX ADMIN — PROBIOTIC PRODUCT - TAB SCH MG: TAB at 12:11

## 2017-12-08 RX ADMIN — ASCORBIC ACID, FOLIC ACID, NIACIN, THIAMINE, RIBOFLAVIN, PYRIDOXINE, CYANOCOBALAMIN, PANTOTHENIC ACID, BIOTIN SCH CAP: 100; 1.5; 1.7; 20; 10; 1; 6; 150; 5 CAPSULE, LIQUID FILLED ORAL at 15:20

## 2017-12-08 RX ADMIN — PREDNISONE SCH MG: 20 TABLET ORAL at 12:10

## 2017-12-08 RX ADMIN — FERROUS SULFATE TAB 325 MG (65 MG ELEMENTAL FE) SCH MG: 325 (65 FE) TAB at 12:11

## 2017-12-08 RX ADMIN — HYDROXYZINE HYDROCHLORIDE PRN MG: 10 TABLET, FILM COATED ORAL at 05:11

## 2017-12-08 RX ADMIN — HEPARIN SODIUM SCH UNIT: 5000 INJECTION, SOLUTION INTRAVENOUS; SUBCUTANEOUS at 15:18

## 2017-12-08 RX ADMIN — AMLODIPINE BESYLATE SCH MG: 10 TABLET ORAL at 12:12

## 2017-12-08 RX ADMIN — FAMOTIDINE SCH MG: 20 TABLET, FILM COATED ORAL at 12:12

## 2017-12-08 RX ADMIN — FUROSEMIDE SCH MG: 20 TABLET ORAL at 12:12

## 2017-12-08 RX ADMIN — Medication SCH UNIT: at 05:08

## 2017-12-08 RX ADMIN — FAMOTIDINE SCH MG: 20 TABLET, FILM COATED ORAL at 23:03

## 2017-12-08 RX ADMIN — Medication SCH UNIT: at 23:03

## 2017-12-08 RX ADMIN — MULTIVITAMIN TABLET SCH TAB: TABLET at 12:11

## 2017-12-08 RX ADMIN — CEFTRIAXONE SCH ML: 1 INJECTION, SOLUTION INTRAVENOUS at 12:13

## 2017-12-08 NOTE — PDOC PROGRESS REPORT
Subjective


Progress Note for:: 12/08/17


Subjective:: 





I saw the patient during dialysis this morning at around 8:50 AM.  He was 

tolerating dialysis well without any complaints.  He admits feeling much better 

with improvement of lower extremity swelling and rashes.


Reason For Visit: 


CELLULITIS/ NEED FOR VASCULAR ACCESS








Physical Exam


Vital Signs: 


 











Temp Pulse Resp BP Pulse Ox


 


 98.1 F   80   16   138/69 H  97 


 


 12/08/17 15:52  12/08/17 15:52  12/08/17 15:52  12/08/17 15:52  12/08/17 15:52








 Intake & Output











 12/07/17 12/08/17 12/09/17





 06:59 06:59 06:59


 


Intake Total 1044 921 535


 


Output Total 2700 1800 3000


 


Balance -5282 -938 -9010


 


Weight 132.8 kg 133.2 kg 








Vital signs during dialysis: Blood pressure 197/94, heart rate 72, blood flow 

rate of 250 mL/min, dialysate flow rate of 500 mL/min.


Exam: 





General appearance: PRESENT: no acute distress, cooperative, well-developed, 

well-nourished


Head exam: PRESENT: atraumatic, normocephalic


Eye exam: PRESENT: conjunctiva slightly pale, PERRLA.  ABSENT: scleral icterus


Neck exam: ABSENT: JVD


Respiratory exam: PRESENT: Normal breath sounds.  ABSENT: crackles, rales, 

rhonchi, unlabored, wheezes


Cardiovascular exam: PRESENT: Regular rate rhythm -+S1, +S2.  ABSENT: diastolic 

murmur, systolic murmur


GI/Abdominal exam: PRESENT: normal bowel sounds, soft.  ABSENT: guarding, mass, 

tenderness


Extremities exam: Improved grade 1 lower extremity bilateral edema


Neurological exam: PRESENT: alert, awake, oriented to person, place and time.


Skin exam: PRESENT: dry, warm, resolving erythema and warmth over the lower 

extremity and upper extremities.  Skin has been peeling off on his legs and 

foot.  No new open lesions.





Results


Laboratory Results: 


 





 12/08/17 05:10 





 12/08/17 05:10 





 











  12/08/17 12/08/17





  05:10 05:10


 


WBC  9.5 


 


RBC  2.63 L 


 


Hgb  8.4 L 


 


Hct  24.8 L 


 


MCV  94 


 


MCH  32.0 


 


MCHC  34.0 


 


RDW  14.0 


 


Plt Count  227 


 


Seg Neutrophils %  81.3 H 


 


Lymphocytes %  9.8 L 


 


Monocytes %  7.4 


 


Eosinophils %  1.2 


 


Basophils %  0.3 


 


Absolute Neutrophils  7.7 


 


Absolute Lymphocytes  0.9 


 


Absolute Monocytes  0.7 


 


Absolute Eosinophils  0.1 


 


Absolute Basophils  0.0 


 


Sodium   140.4


 


Potassium   4.3


 


Chloride   106


 


Carbon Dioxide   22


 


Anion Gap   12


 


BUN   59 H


 


Creatinine   4.62 H


 


Est GFR ( Amer)   15 L


 


Est GFR (Non-Af Amer)   13 L


 


Glucose   105


 


Calcium   8.6














Assessment & Plan





- Diagnosis


(1) Uremia, acute


Is this a current diagnosis for this admission?: Yes   


Plan: 


Patient presented with uremic symptoms aside from his diffuse skin rash.  





We will ask for discharge planner consultation to arrange for outpatient 

hemodialysis once the patient is discharged.





Patient symptoms has improved with initiation of hemodialysis.








(2) End stage renal disease


Is this a current diagnosis for this admission?: Yes   


Plan: 


Progressively worsening for the last few months and currently now uremic.  This 

is due to polycystic kidney disease.  Patient is actually on the [facility] kidney 

transplant list and potential living donors are being evaluated.





We did dialysis today for 3 hours, using the patient's trialysis catheter, with 

2 potassium bath, blood flow rate of 250 mL per minute, dialysate flow rate of 

500 mL per minute, ultrafiltration 2 L as tolerated, no heparin and Procrit 

with 20,000 units during dialysis intravenously.





I spoke to Dr. Fatimah Son today and he will schedule the patient for 

PermCath placement on Monday.  If everything is okay and [facility] accepted the 

patient he can probably go home midweek next week.








(3) Polycystic kidney disease


Is this a current diagnosis for this admission?: Yes   





(4) Anemia in chronic kidney disease (CKD)


Is this a current diagnosis for this admission?: Yes   


Plan: 


We will check iron panel.  His ferritin is mildly low but the rest of the iron 

indices are acceptable.  We will give Procrit during dialysis.








(5) Hypertension


Qualifiers: 


   Hypertension type: essential hypertension   Qualified Code(s): I10 - 

Essential (primary) hypertension   


Is this a current diagnosis for this admission?: Yes   


Plan: 


Resume all home blood pressure medications including diuretics.








(6) Cellulitis


Qualifiers: 


   Site of cellulitis: extremity   Site of cellulitis of extremity: lower 

extremity   Laterality: left   Qualified Code(s): L03.116 - Cellulitis of left 

lower limb   


Is this a current diagnosis for this admission?: Yes   


Plan: 


Patient needs IV antibiotics as initiated per primary service.  This is 

associated with pruritic rash which could be contributed by acute to uremia.  

Currently clinically improving.








(7) Hyperphosphatemia


Is this a current diagnosis for this admission?: Yes   


Plan: 


Low phosphorus diet








(8) Secondary hyperparathyroidism (of renal origin)


Is this a current diagnosis for this admission?: Yes   


Plan: 


We will give Zemplar IV during dialysis treatment for the successive dialysis 

treatments.  For now we will continue oral calcitriol.








- Time


Time with patient: 15-25 minutes

## 2017-12-08 NOTE — PDOC PROGRESS REPORT
Subjective


Progress Note for:: 12/08/17


Subjective:: 





The patient seen today on the rounds.  He is in hemodialysis.  He is tolerating 

hemodialysis well.


He is going to receive the Procrit.  He is scheduled for a PermCath next week.


Reason For Visit: 


CELLULITIS








Physical Exam


Vital Signs: 


 











Temp Pulse Resp BP Pulse Ox


 


 97.7 F   52 L  16   145/72 H  97 


 


 12/08/17 04:00  12/08/17 04:00  12/08/17 04:00  12/08/17 04:00  12/08/17 04:00








 Intake & Output











 12/07/17 12/08/17 12/09/17





 06:59 06:59 06:59


 


Intake Total 1044 921 


 


Output Total 2700 1800 


 


Balance -5436 -879 


 


Weight 132.8 kg 133.2 kg 











General appearance: PRESENT: no acute distress


Head exam: PRESENT: atraumatic


Eye exam: PRESENT: conjunctival injection


Neck exam: ABSENT: carotid bruit, JVD


Respiratory exam: PRESENT: crackles


Cardiovascular exam: PRESENT: RRR, +S1, +S2


Pulses: PRESENT: +1 pedal pulses bilateral


GI/Abdominal exam: PRESENT: normal bowel sounds, soft


Extremities exam: PRESENT: pedal edema


Musculoskeletal exam: PRESENT: ambulatory


Neurological exam: PRESENT: alert, awake


Skin exam: PRESENT: rash





Results


Laboratory Results: 


 





 12/08/17 05:10 





 12/08/17 05:10 





 











  12/06/17 12/08/17 12/08/17





  07:37 05:10 05:10


 


WBC   9.5 


 


RBC   2.63 L 


 


Hgb   8.4 L 


 


Hct   24.8 L 


 


MCV   94 


 


MCH   32.0 


 


MCHC   34.0 


 


RDW   14.0 


 


Plt Count   227 


 


Seg Neutrophils %   81.3 H 


 


Lymphocytes %   9.8 L 


 


Monocytes %   7.4 


 


Eosinophils %   1.2 


 


Basophils %   0.3 


 


Absolute Neutrophils   7.7 


 


Absolute Lymphocytes   0.9 


 


Absolute Monocytes   0.7 


 


Absolute Eosinophils   0.1 


 


Absolute Basophils   0.0 


 


Sodium    140.4


 


Potassium    4.3


 


Chloride    106


 


Carbon Dioxide    22


 


Anion Gap    12


 


BUN    59 H


 


Creatinine    4.62 H


 


Est GFR ( Amer)    15 L


 


Est GFR (Non-Af Amer)    13 L


 


Glucose    105


 


Calcium    8.6


 


Transferrin  234  














Assessment & Plan





- Diagnosis


(1) End stage renal disease


Is this a current diagnosis for this admission?: Yes   


Plan: 


PermCath in place.  Proceed with hemodialysis


Receiving dialysis.  Patient is scheduled for PermCath placement on Monday








(2) Hypertension


Qualifiers: 


   Hypertension type: essential hypertension   Qualified Code(s): I10 - 

Essential (primary) hypertension   


Is this a current diagnosis for this admission?: Yes   


Plan: 


Continue current medications








(3) Polycystic kidney disease


Is this a current diagnosis for this admission?: Yes   


Plan: 


Presently being evaluated by the transplant team at [facility]








(4) Cellulitis


Qualifiers: 


   Site of cellulitis: extremity   Site of cellulitis of extremity: lower 

extremity   Qualified Code(s): L03.116 - Cellulitis of left lower limb   


Is this a current diagnosis for this admission?: Yes   


Plan: 


Improving we will continue the antibiotics and reevaluate in the morning








(5) Urticarial rash


Is this a current diagnosis for this admission?: Yes   


Plan: 


Improved continue current medications

## 2017-12-09 RX ADMIN — HEPARIN SODIUM SCH UNIT: 5000 INJECTION, SOLUTION INTRAVENOUS; SUBCUTANEOUS at 22:09

## 2017-12-09 RX ADMIN — FERROUS SULFATE TAB 325 MG (65 MG ELEMENTAL FE) SCH MG: 325 (65 FE) TAB at 10:03

## 2017-12-09 RX ADMIN — PREDNISONE SCH MG: 20 TABLET ORAL at 10:03

## 2017-12-09 RX ADMIN — HEPARIN SODIUM SCH UNIT: 5000 INJECTION, SOLUTION INTRAVENOUS; SUBCUTANEOUS at 14:16

## 2017-12-09 RX ADMIN — CEFTRIAXONE SCH ML: 1 INJECTION, SOLUTION INTRAVENOUS at 09:59

## 2017-12-09 RX ADMIN — OMEGA-3-ACID ETHYL ESTERS SCH GM: 900 CAPSULE ORAL at 07:49

## 2017-12-09 RX ADMIN — Medication SCH ML: at 14:16

## 2017-12-09 RX ADMIN — ASCORBIC ACID, FOLIC ACID, NIACIN, THIAMINE, RIBOFLAVIN, PYRIDOXINE, CYANOCOBALAMIN, PANTOTHENIC ACID, BIOTIN SCH CAP: 100; 1.5; 1.7; 20; 10; 1; 6; 150; 5 CAPSULE, LIQUID FILLED ORAL at 15:58

## 2017-12-09 RX ADMIN — Medication SCH ML: at 06:05

## 2017-12-09 RX ADMIN — PROBIOTIC PRODUCT - TAB SCH MG: TAB at 10:02

## 2017-12-09 RX ADMIN — AMLODIPINE BESYLATE SCH MG: 10 TABLET ORAL at 09:58

## 2017-12-09 RX ADMIN — Medication SCH UNIT: at 22:09

## 2017-12-09 RX ADMIN — HEPARIN SODIUM SCH UNIT: 5000 INJECTION, SOLUTION INTRAVENOUS; SUBCUTANEOUS at 06:05

## 2017-12-09 RX ADMIN — Medication SCH UNIT: at 14:15

## 2017-12-09 RX ADMIN — FUROSEMIDE SCH MG: 20 TABLET ORAL at 10:02

## 2017-12-09 RX ADMIN — FAMOTIDINE SCH MG: 20 TABLET, FILM COATED ORAL at 22:09

## 2017-12-09 RX ADMIN — Medication SCH ML: at 22:09

## 2017-12-09 RX ADMIN — CARVEDILOL SCH MG: 6.25 TABLET, FILM COATED ORAL at 17:43

## 2017-12-09 RX ADMIN — FAMOTIDINE SCH MG: 20 TABLET, FILM COATED ORAL at 09:59

## 2017-12-09 RX ADMIN — Medication SCH UNIT: at 06:05

## 2017-12-09 RX ADMIN — ASPIRIN SCH MG: 81 TABLET, COATED ORAL at 09:58

## 2017-12-09 RX ADMIN — MULTIVITAMIN TABLET SCH TAB: TABLET at 10:02

## 2017-12-09 RX ADMIN — CARVEDILOL SCH MG: 6.25 TABLET, FILM COATED ORAL at 09:58

## 2017-12-09 NOTE — PDOC PROGRESS REPORT
Subjective


Progress Note for:: 12/09/17


Subjective:: 





Patient denies any complaints.


Reason For Visit: 


CELLULITIS/ NEED FOR VASCULAR ACCESS








Physical Exam


Vital Signs: 


 











Temp Pulse Resp BP Pulse Ox


 


 97.7 F   64   18   139/72 H  99 


 


 12/09/17 11:36  12/09/17 11:36  12/09/17 11:36  12/09/17 11:36  12/09/17 11:36








 Intake & Output











 12/08/17 12/09/17 12/10/17





 06:59 06:59 06:59


 


Intake Total 921 535 


 


Output Total 1800 3000 


 


Balance -409 -1624 


 


Weight 133.2 kg 133.2 kg 











General appearance: PRESENT: no acute distress


Eye exam: PRESENT: conjunctiva pink.  ABSENT: scleral icterus


Mouth exam: PRESENT: moist, tongue midline


Neck exam: ABSENT: JVD


Respiratory exam: PRESENT: clear to auscultation tasha.  ABSENT: rales, rhonchi, 

wheezes


Cardiovascular exam: PRESENT: RRR.  ABSENT: diastolic murmur, rubs, systolic 

murmur


GI/Abdominal exam: PRESENT: normal bowel sounds, soft.  ABSENT: distended, 

guarding, mass, organolmegaly, rebound, tenderness


Extremities exam: ABSENT: calf tenderness, clubbing, pedal edema


Neurological exam: PRESENT: alert, awake, oriented to person, oriented to place

, oriented to time, oriented to situation, CN II-XII grossly intact.  ABSENT: 

motor sensory deficit


Psychiatric exam: PRESENT: appropriate affect


Skin exam: PRESENT: dry, intact, warm.  ABSENT: cyanosis, rash





Results


Laboratory Results: 


 





 12/08/17 05:10 





 12/08/17 05:10 











Assessment & Plan





- Diagnosis


(1) End stage renal disease


Is this a current diagnosis for this admission?: Yes   


Plan: 


Patient has been tolerating dialysis well.  Patient will get a PermCath placed 

on Monday and hopefully be discharged home after dialysis.








(2) Polycystic kidney disease


Is this a current diagnosis for this admission?: Yes   


Plan: 


This is the cause for his chronic renal failure.








(3) Anemia in chronic kidney disease (CKD)


Is this a current diagnosis for this admission?: Yes   


Plan: 


Patient has received Procrit.








(4) Cellulitis of left lower extremity


Is this a current diagnosis for this admission?: Yes   


Plan: 


This has improved with vancomycin and Rocephin.  Cultures are negative so far.








- Time


Time Spent with patient: 25-34 minutes





- Inpatient Certification


Medical Necessity: Need Close Monitoring Due to Risk of Patient Decompensation, 

Need for IV Antibiotics

## 2017-12-10 RX ADMIN — HEPARIN SODIUM SCH UNIT: 5000 INJECTION, SOLUTION INTRAVENOUS; SUBCUTANEOUS at 06:03

## 2017-12-10 RX ADMIN — FUROSEMIDE SCH MG: 20 TABLET ORAL at 10:01

## 2017-12-10 RX ADMIN — Medication SCH UNIT: at 06:03

## 2017-12-10 RX ADMIN — CEFTRIAXONE SCH ML: 1 INJECTION, SOLUTION INTRAVENOUS at 10:02

## 2017-12-10 RX ADMIN — HEPARIN SODIUM SCH UNIT: 5000 INJECTION, SOLUTION INTRAVENOUS; SUBCUTANEOUS at 22:14

## 2017-12-10 RX ADMIN — OMEGA-3-ACID ETHYL ESTERS SCH GM: 900 CAPSULE ORAL at 07:42

## 2017-12-10 RX ADMIN — HEPARIN SODIUM SCH UNIT: 5000 INJECTION, SOLUTION INTRAVENOUS; SUBCUTANEOUS at 13:28

## 2017-12-10 RX ADMIN — ASPIRIN SCH MG: 81 TABLET, COATED ORAL at 10:00

## 2017-12-10 RX ADMIN — AMLODIPINE BESYLATE SCH MG: 10 TABLET ORAL at 10:01

## 2017-12-10 RX ADMIN — Medication SCH ML: at 13:28

## 2017-12-10 RX ADMIN — Medication SCH UNIT: at 13:28

## 2017-12-10 RX ADMIN — PREDNISONE SCH MG: 20 TABLET ORAL at 10:01

## 2017-12-10 RX ADMIN — Medication SCH ML: at 22:27

## 2017-12-10 RX ADMIN — CARVEDILOL SCH MG: 6.25 TABLET, FILM COATED ORAL at 10:02

## 2017-12-10 RX ADMIN — Medication SCH UNIT: at 22:14

## 2017-12-10 RX ADMIN — PROBIOTIC PRODUCT - TAB SCH MG: TAB at 10:00

## 2017-12-10 RX ADMIN — Medication SCH ML: at 06:03

## 2017-12-10 RX ADMIN — CARVEDILOL SCH MG: 6.25 TABLET, FILM COATED ORAL at 17:25

## 2017-12-10 RX ADMIN — FAMOTIDINE SCH MG: 20 TABLET, FILM COATED ORAL at 10:02

## 2017-12-10 RX ADMIN — ASCORBIC ACID, FOLIC ACID, NIACIN, THIAMINE, RIBOFLAVIN, PYRIDOXINE, CYANOCOBALAMIN, PANTOTHENIC ACID, BIOTIN SCH CAP: 100; 1.5; 1.7; 20; 10; 1; 6; 150; 5 CAPSULE, LIQUID FILLED ORAL at 16:28

## 2017-12-10 RX ADMIN — FERROUS SULFATE TAB 325 MG (65 MG ELEMENTAL FE) SCH MG: 325 (65 FE) TAB at 10:01

## 2017-12-10 RX ADMIN — MULTIVITAMIN TABLET SCH TAB: TABLET at 10:01

## 2017-12-10 RX ADMIN — FAMOTIDINE SCH MG: 20 TABLET, FILM COATED ORAL at 22:27

## 2017-12-10 NOTE — PDOC PROGRESS REPORT
Subjective


Progress Note for:: 12/10/17


Subjective:: 





Patient denies any complaints.


Reason For Visit: 


CELLULITIS/ NEED FOR VASCULAR ACCESS








Physical Exam


Vital Signs: 


 











Temp Pulse Resp BP Pulse Ox


 


 98.2 F   83   18   114/74   99 


 


 12/10/17 08:13  12/10/17 08:13  12/10/17 08:13  12/10/17 08:13  12/10/17 08:13








 Intake & Output











 12/09/17 12/10/17 12/11/17





 06:59 06:59 06:59


 


Intake Total 535 1660 


 


Output Total 3000 1600 


 


Balance -2465 60 


 


Weight 133.2 kg 133.4 kg 











General appearance: PRESENT: no acute distress


Eye exam: PRESENT: conjunctiva pink.  ABSENT: scleral icterus


Mouth exam: PRESENT: moist, tongue midline


Neck exam: ABSENT: JVD


Respiratory exam: PRESENT: clear to auscultation tasha.  ABSENT: rales, rhonchi, 

wheezes


Cardiovascular exam: PRESENT: RRR.  ABSENT: diastolic murmur, rubs, systolic 

murmur


GI/Abdominal exam: PRESENT: normal bowel sounds, soft.  ABSENT: distended, 

guarding, mass, organolmegaly, rebound, tenderness


Extremities exam: ABSENT: calf tenderness, clubbing, pedal edema


Neurological exam: PRESENT: alert, awake, oriented to person, oriented to place

, oriented to time, oriented to situation, CN II-XII grossly intact.  ABSENT: 

motor sensory deficit


Psychiatric exam: PRESENT: appropriate affect


Skin exam: PRESENT: dry, intact, warm.  ABSENT: cyanosis, rash





Results


Laboratory Results: 


 





 12/08/17 05:10 





 12/08/17 05:10 











Assessment & Plan





- Diagnosis


(1) End stage renal disease


Is this a current diagnosis for this admission?: Yes   


Plan: 


Patient has been tolerating dialysis well.  Patient will get a PermCath placed 

on Monday and hopefully be discharged home after dialysis.








(2) Polycystic kidney disease


Is this a current diagnosis for this admission?: Yes   


Plan: 


This is the cause for his chronic renal failure.








(3) Anemia in chronic kidney disease (CKD)


Is this a current diagnosis for this admission?: Yes   


Plan: 


Patient has received Procrit.








(4) Cellulitis of left lower extremity


Is this a current diagnosis for this admission?: Yes   


Plan: 


This has improved with vancomycin and Rocephin.  Cultures are negative so far.








- Time


Time Spent with patient: 15-24 minutes





- Inpatient Certification


Medical Necessity: Need Close Monitoring Due to Risk of Patient Decompensation

## 2017-12-11 LAB
%HYPO/RBC NFR BLD AUTO: (no result) %
ABSOLUTE EOSINOPHILS# (MANUAL): 0 10^3/UL (ref 0–0.6)
ANION GAP SERPL CALC-SCNC: 18 MMOL/L (ref 5–19)
BASOPHILS NFR BLD MANUAL: 0 % (ref 0–2)
BUN SERPL-MCNC: 73 MG/DL (ref 7–20)
CALCIUM: 8.8 MG/DL (ref 8.4–10.2)
CHLORIDE SERPL-SCNC: 103 MMOL/L (ref 98–107)
CO2 SERPL-SCNC: 22 MMOL/L (ref 22–30)
CREAT SERPL-MCNC: 5.29 MG/DL (ref 0.52–1.25)
EOSINOPHIL NFR BLD MANUAL: 0 % (ref 0–6)
ERYTHROCYTE [DISTWIDTH] IN BLOOD BY AUTOMATED COUNT: 14 % (ref 11.5–14)
GLUCOSE SERPL-MCNC: 95 MG/DL (ref 75–110)
HCT VFR BLD CALC: 28.8 % (ref 37.9–51)
HGB BLD-MCNC: 9.8 G/DL (ref 13.5–17)
HGB HCT DIFFERENCE: 0.6
MCH RBC QN AUTO: 32.7 PG (ref 27–33.4)
MCHC RBC AUTO-ENTMCNC: 34 G/DL (ref 32–36)
MCV RBC AUTO: 96 FL (ref 80–97)
NRBC BLD AUTO-RTO: 1 /100 WBC
OVALOCYTES BLD QL SMEAR: (no result)
PLATELET CLUMP BLD QL SMEAR: PRESENT
POIKILOCYTOSIS BLD QL SMEAR: (no result)
POLYCHROMASIA BLD QL SMEAR: (no result)
POTASSIUM SERPL-SCNC: 4.2 MMOL/L (ref 3.6–5)
RBC # BLD AUTO: 3 10^6/UL (ref 4.35–5.55)
SODIUM SERPL-SCNC: 143.1 MMOL/L (ref 137–145)
TOTAL CELLS COUNTED BLD: 100
TROUGH DRAW TIME: 706
VARIANT LYMPHS NFR BLD MANUAL: 14 % (ref 13–45)
WBC # BLD AUTO: 11.3 10^3/UL (ref 4–10.5)
WBC TOXIC VACUOLES BLD QL SMEAR: PRESENT

## 2017-12-11 PROCEDURE — B549ZZA ULTRASONOGRAPHY OF INFERIOR VENA CAVA, GUIDANCE: ICD-10-PCS | Performed by: SURGERY

## 2017-12-11 PROCEDURE — 06H033Z INSERTION OF INFUSION DEVICE INTO INFERIOR VENA CAVA, PERCUTANEOUS APPROACH: ICD-10-PCS | Performed by: SURGERY

## 2017-12-11 PROCEDURE — 5A1D70Z PERFORMANCE OF URINARY FILTRATION, INTERMITTENT, LESS THAN 6 HOURS PER DAY: ICD-10-PCS | Performed by: INTERNAL MEDICINE

## 2017-12-11 RX ADMIN — Medication SCH UNIT: at 15:25

## 2017-12-11 RX ADMIN — CARVEDILOL SCH MG: 6.25 TABLET, FILM COATED ORAL at 21:34

## 2017-12-11 RX ADMIN — OXYCODONE AND ACETAMINOPHEN PRN TAB: 5; 325 TABLET ORAL at 10:25

## 2017-12-11 RX ADMIN — MULTIVITAMIN TABLET SCH TAB: TABLET at 10:22

## 2017-12-11 RX ADMIN — VANCOMYCIN HYDROCHLORIDE SCH MG: 1 INJECTION, POWDER, LYOPHILIZED, FOR SOLUTION INTRAVENOUS at 19:05

## 2017-12-11 RX ADMIN — AMLODIPINE BESYLATE SCH MG: 10 TABLET ORAL at 12:29

## 2017-12-11 RX ADMIN — PREDNISONE SCH MG: 20 TABLET ORAL at 10:22

## 2017-12-11 RX ADMIN — HEPARIN SODIUM SCH UNIT: 5000 INJECTION, SOLUTION INTRAVENOUS; SUBCUTANEOUS at 05:45

## 2017-12-11 RX ADMIN — ASPIRIN SCH MG: 81 TABLET, COATED ORAL at 10:22

## 2017-12-11 RX ADMIN — OXYCODONE AND ACETAMINOPHEN PRN TAB: 5; 325 TABLET ORAL at 21:38

## 2017-12-11 RX ADMIN — FERROUS SULFATE TAB 325 MG (65 MG ELEMENTAL FE) SCH MG: 325 (65 FE) TAB at 10:22

## 2017-12-11 RX ADMIN — HEPARIN SODIUM SCH UNIT: 5000 INJECTION, SOLUTION INTRAVENOUS; SUBCUTANEOUS at 15:23

## 2017-12-11 RX ADMIN — PROBIOTIC PRODUCT - TAB SCH MG: TAB at 10:22

## 2017-12-11 RX ADMIN — OMEGA-3-ACID ETHYL ESTERS SCH GM: 900 CAPSULE ORAL at 10:22

## 2017-12-11 RX ADMIN — FUROSEMIDE SCH MG: 20 TABLET ORAL at 12:29

## 2017-12-11 RX ADMIN — CEFTRIAXONE SCH ML: 1 INJECTION, SOLUTION INTRAVENOUS at 12:24

## 2017-12-11 RX ADMIN — Medication SCH ML: at 08:34

## 2017-12-11 RX ADMIN — Medication SCH ML: at 15:24

## 2017-12-11 RX ADMIN — CARVEDILOL SCH MG: 6.25 TABLET, FILM COATED ORAL at 12:29

## 2017-12-11 RX ADMIN — Medication SCH UNIT: at 05:45

## 2017-12-11 RX ADMIN — HEPARIN SODIUM SCH UNIT: 5000 INJECTION, SOLUTION INTRAVENOUS; SUBCUTANEOUS at 21:34

## 2017-12-11 RX ADMIN — Medication SCH UNIT: at 21:34

## 2017-12-11 RX ADMIN — Medication SCH ML: at 21:34

## 2017-12-11 RX ADMIN — ASCORBIC ACID, FOLIC ACID, NIACIN, THIAMINE, RIBOFLAVIN, PYRIDOXINE, CYANOCOBALAMIN, PANTOTHENIC ACID, BIOTIN SCH CAP: 100; 1.5; 1.7; 20; 10; 1; 6; 150; 5 CAPSULE, LIQUID FILLED ORAL at 21:28

## 2017-12-11 NOTE — RADIOLOGY REPORT (SQ)
EXAM DESCRIPTION:  TUNNELED CENTRAL LINE; GUIDANCE FLUOROSCOPIC



COMPLETED DATE/TIME:  12/11/2017 3:43 pm; 12/11/2017 3:46 pm



REASON FOR STUDY:  T82.858A K12.2  CELLULITIS AND ABSCESS OF MOUTH



COMPARISON:  Chest films 12/3/2017



FLUOROSCOPY TIME:  0.6 minutes

14 images saved to PACS.



TECHNIQUE:  Intra-operative images acquired during surgical procedure to evaluate progress.

NUMBER OF IMAGES: 14 C-arm images saved to pac's



LIMITATIONS:  None.



FINDINGS:  Intra procedural imaging and fluoro during placement of a right-sided tunneled central lulu
ous dialysis catheter with the tip in the right atrium.  Please see Dr. Lennox Williams operative rep
ort for further details



IMPRESSION:  Intra procedural imaging and fluoro



COMMENT:  Quality :  Final reports for procedures using fluoroscopy that document radiation exp
osure indices, or exposure time and number of fluorographic images (if radiation exposure indices are
 not available)

Please consult full operative report of the attending physician for description of the procedure.



TECHNICAL DOCUMENTATION:  JOB ID:  [id number]

 2011 [employer]- All Rights Reserved

## 2017-12-11 NOTE — OPERATIVE REPORT
Operative Report


DATE OF SURGERY: 12/05/17


PREOPERATIVE DIAGNOSIS: End-stage renal disease.


POSTOPERATIVE DIAGNOSIS: End-stage renal disease.


OPERATION: 1.  Ultrasound evaluation of the right Internal Jugular vein.  2.  

Insertion of Perm cath hemodialysis catheter via real-time ultrasound access in 

the right internal jugular vein.  3.  Angiogram and interpretation.


SURGEON: LENNOX WILLIAMS 1ST ASSISTANT: None


ANESTHESIA: Moderate Sedation


TISSUE REMOVED OR ALTERED: Not applicable.


ESTIMATED BLOOD LOSS: 5 mL.


INTRAOPERATIVE FINDINGS: Of a satisfactory right internal jugular vein.  

Estimated size about 1.5 cm.  Quite deep in this large body habitus patient.  

Satisfactory placement with easy egress of blood and ingress of heparinized 

solution.  Satisfactory angiogram with tip of the catheter in about the middle 

of the right atrial pool.   Satisfactory flow of contrast through the right 

atrium, ventricle and pulmonary outflow tract.


PROCEDURE: 








After obtaining informed consent, the patient was taken to the [Cath Lab] and 

positioned supine.  The [right neck] and chest were prepared with chlorhexidine 

and draped out with sterile linen.  After the " universal timeout", in which it 

was verified that the patient continued to receive antibiotic, the procedure 

commenced.  A steriley  sheathed ultrasound probe was used to evaluate the [

right internal jugular] vein.  Local anesthesia was infiltrated adjacent to the 

probe.  Access into the [right internal jugular] vein was obtained using a 

micropuncture needle, followed by micropuncture wire and then a micropuncture 

catheter.  This was followed by introduction of a 0.035 guidewire the tip of 

which was placed down into the inferior vena cava .  A 27 cm long [Perm catheter

] was now positioned over the chest and an exit site marked and locally 

anesthetized ,the catheter was placed between the 2 incisions.  Proximally, the 

catheter was  now positioned using a peel-away sheath, after dilation.  Easy 

ingress of heparinized solution and egress of blood obtained through both 

ports.  A completion angiogram was done by injecting contrast.  The findings 

were as dictated.  The neck incision was now closed using interrupted 3-0 PDS 

to the subcutaneous tissues, the catheter was anchored at the exit site using 3-

0 PDS.  A Biopatch device was now placed adjacent to the catheter.  Dressings 

were applied and the procedure concluded. 








Exposure time: [0.6 minutes].





Exposure: 15.8 mg/cm.  Per centimeters squared.





Contrast amount: [5 mL] of Isovue-M-300 low osmolality.








 Copies of the dictated operative report for Dr. Lennox Williams MD.concluded.  

Copies of the dictated operative report for Dr. Lennox Williams MD.

## 2017-12-11 NOTE — PDOC PROGRESS REPORT
Subjective


Progress Note for:: 12/11/17


Subjective:: 





The patient states to feel better.  He is presently scheduled for a PermCath 

placement.


Reason For Visit: 


CELLULITIS/ NEED FOR VASCULAR ACCESS








Physical Exam


Vital Signs: 


 











Temp Pulse Resp BP Pulse Ox


 


 97.7 F   64   14   121/67   99 


 


 12/11/17 12:00  12/11/17 14:00  12/11/17 12:00  12/11/17 12:00  12/11/17 12:00








 Intake & Output











 12/10/17 12/11/17 12/12/17





 06:59 06:59 06:59


 


Intake Total 1660 890 


 


Output Total 1600 700 


 


Balance 60 190 


 


Weight 133.4 kg 133.1 kg 











General appearance: PRESENT: no acute distress


Head exam: PRESENT: atraumatic


Eye exam: PRESENT: conjunctiva pink


Neck exam: ABSENT: carotid bruit, JVD


Respiratory exam: PRESENT: crackles


Cardiovascular exam: PRESENT: RRR, +S1, +S2


Pulses: PRESENT: +1 pedal pulses bilateral


GI/Abdominal exam: PRESENT: normal bowel sounds, soft


Extremities exam: PRESENT: full ROM


Musculoskeletal exam: PRESENT: ambulatory


Neurological exam: PRESENT: alert, awake





Results


Laboratory Results: 


 





 12/11/17 07:06 





 12/11/17 07:06 





 











  12/11/17 12/11/17





  07:06 07:06


 


WBC  11.3 H 


 


RBC  3.00 L 


 


Hgb  9.8 L 


 


Hct  28.8 L 


 


MCV  96 


 


MCH  32.7 


 


MCHC  34.0 


 


RDW  14.0 


 


Plt Count  227 


 


Seg Neutrophils %  Not Reportable 


 


Lymphocytes %  Not Reportable 


 


Monocytes %  Not Reportable 


 


Eosinophils %  Not Reportable 


 


Basophils %  Not Reportable 


 


Absolute Neutrophils  Not Reportable 


 


Absolute Lymphocytes  Not Reportable 


 


Absolute Monocytes  Not Reportable 


 


Absolute Eosinophils  Not Reportable 


 


Absolute Basophils  Not Reportable 


 


Sodium   143.1


 


Potassium   4.2


 


Chloride   103


 


Carbon Dioxide   22


 


Anion Gap   18


 


BUN   73 H


 


Creatinine   5.29 H


 


Est GFR ( Amer)   13 L


 


Est GFR (Non-Af Amer)   11 L


 


Glucose   95


 


Calcium   8.8











Impressions: 


 





Central Venous Line  12/11/17 00:00


IMPRESSION:  Intra procedural imaging and fluoro


 








Guidance Fluoroscopy  12/11/17 00:00


IMPRESSION:  Intra procedural imaging and fluoro


 














Assessment & Plan





- Diagnosis


(1) End stage renal disease


Is this a current diagnosis for this admission?: Yes   


Plan: 


PermCath in place.  Proceed with hemodialysis


Receiving dialysis.  Patient is scheduled for PermCath placement on Monday








(2) Hypertension


Qualifiers: 


   Hypertension type: essential hypertension   Qualified Code(s): I10 - 

Essential (primary) hypertension   


Is this a current diagnosis for this admission?: Yes   


Plan: 


Continue current medications








(3) Polycystic kidney disease


Is this a current diagnosis for this admission?: Yes   


Plan: 


Presently being evaluated by the transplant team at [facility]








(4) Cellulitis of left lower extremity


Is this a current diagnosis for this admission?: Yes   


Plan: 


Continue current medications








(5) Uremia, acute


Is this a current diagnosis for this admission?: Yes   


Plan: 


We will attempt dialysis through a new PermCath

## 2017-12-11 NOTE — RADIOLOGY REPORT (SQ)
EXAM DESCRIPTION:  TUNNELED CENTRAL LINE; GUIDANCE FLUOROSCOPIC



COMPLETED DATE/TIME:  12/11/2017 3:43 pm; 12/11/2017 3:46 pm



REASON FOR STUDY:  T82.858A K12.2  CELLULITIS AND ABSCESS OF MOUTH



COMPARISON:  Chest films 12/3/2017



FLUOROSCOPY TIME:  0.6 minutes

14 images saved to PACS.



TECHNIQUE:  Intra-operative images acquired during surgical procedure to evaluate progress.

NUMBER OF IMAGES: 14 C-arm images saved to pac's



LIMITATIONS:  None.



FINDINGS:  Intra procedural imaging and fluoro during placement of a right-sided tunneled central lulu
ous dialysis catheter with the tip in the right atrium.  Please see Dr. Lennox Williams operative rep
ort for further details



IMPRESSION:  Intra procedural imaging and fluoro



COMMENT:  Quality :  Final reports for procedures using fluoroscopy that document radiation exp
osure indices, or exposure time and number of fluorographic images (if radiation exposure indices are
 not available)

Please consult full operative report of the attending physician for description of the procedure.



TECHNICAL DOCUMENTATION:  JOB ID:  [id number]

 2011 [employer]- All Rights Reserved

## 2017-12-11 NOTE — PDOC PROGRESS REPORT
Subjective


Progress Note for:: 12/11/17


Subjective:: 





I saw the patient on dialysis at around 6 PM tonight.  He underwent right IJ 

PermCath placement this morning care of Dr. Son.  He was feeling a little 

bit of soreness because he said that plastic bandage over it is a little bit 

too tight other than that the procedure went well.  He was tolerating dialysis 

very well when I saw him.  His rashes and lower extremity swelling is resolving.


Reason For Visit: 


ESRD





Physical Exam


Vital Signs: 


 











Temp Pulse Resp BP Pulse Ox


 


 97.7 F   73   14   121/67   99 


 


 12/11/17 12:00  12/11/17 19:00  12/11/17 12:00  12/11/17 12:00  12/11/17 12:00








 Intake & Output











 12/10/17 12/11/17 12/12/17





 06:59 06:59 06:59


 


Intake Total 1660 890 638


 


Output Total 1600 700 


 


Balance 60 190 638


 


Weight 133.4 kg 133.1 kg 








Vitals during dialysis: Blood pressure 172/100, heart rate of 75, blood flow 

rate of 350 mL/min dialysate flow rate of 500 mL/min.


Exam: 





General appearance: PRESENT: no acute distress, cooperative, well-developed, 

well-nourished


Head exam: PRESENT: atraumatic, normocephalic


Eye exam: PRESENT: conjunctiva slightly pale, PERRLA.  ABSENT: scleral icterus


Neck exam: ABSENT: JVD; right IJ PermCath in place and is being used


Respiratory exam: PRESENT: Normal breath sounds.  ABSENT: crackles, rales, 

rhonchi, unlabored, wheezes


Cardiovascular exam: PRESENT: Regular rate rhythm -+S1, +S2.  ABSENT: diastolic 

murmur, systolic murmur


GI/Abdominal exam: PRESENT: normal bowel sounds, soft.  ABSENT: guarding, mass, 

tenderness


Extremities exam: Improved trace bilateral lower extremity edema and almost 

resolved upper extremity edema


Neurological exam: PRESENT: alert, awake, oriented to person, place and time.


Skin exam: PRESENT: dry, warm,; they are with him and warmth over his bilateral 

lower extremities is been resolving.  There is some peeling of skin.





Results


Laboratory Results: 


 





 12/11/17 07:06 





 12/11/17 07:06 





 











  12/11/17 12/11/17





  07:06 07:06


 


WBC  11.3 H 


 


RBC  3.00 L 


 


Hgb  9.8 L 


 


Hct  28.8 L 


 


MCV  96 


 


MCH  32.7 


 


MCHC  34.0 


 


RDW  14.0 


 


Plt Count  227 


 


Seg Neutrophils %  Not Reportable 


 


Lymphocytes %  Not Reportable 


 


Monocytes %  Not Reportable 


 


Eosinophils %  Not Reportable 


 


Basophils %  Not Reportable 


 


Absolute Neutrophils  Not Reportable 


 


Absolute Lymphocytes  Not Reportable 


 


Absolute Monocytes  Not Reportable 


 


Absolute Eosinophils  Not Reportable 


 


Absolute Basophils  Not Reportable 


 


Sodium   143.1


 


Potassium   4.2


 


Chloride   103


 


Carbon Dioxide   22


 


Anion Gap   18


 


BUN   73 H


 


Creatinine   5.29 H


 


Est GFR ( Amer)   13 L


 


Est GFR (Non-Af Amer)   11 L


 


Glucose   95


 


Calcium   8.8











Impressions: 


 





Central Venous Line  12/11/17 00:00


IMPRESSION:  Intra procedural imaging and fluoro


 








Guidance Fluoroscopy  12/11/17 00:00


IMPRESSION:  Intra procedural imaging and fluoro


 














Assessment & Plan





- Diagnosis


(1) Uremia, acute


Is this a current diagnosis for this admission?: Yes   


Plan: 


Patient presented with uremic symptoms aside from his diffuse skin rash.  





Patient symptoms has improved with initiation of hemodialysis.








(2) End stage renal disease


Is this a current diagnosis for this admission?: Yes   


Plan: 


Progressively worsening for the last few months and currently now uremic.  This 

is due to polycystic kidney disease.  Patient is actually on the [facility] 

transplant list and potential living donors are being evaluated.





We did dialysis today for 3 hours, using the patient's trialysis catheter, with 

2 potassium bath, blood flow rate of 350 mL per minute, dialysate flow rate of 

500 mL per minute, ultrafiltration 2 L as tolerated, no heparin and Procrit 

with 20,000 units during dialysis intravenously.





Patient has been accepted as a patient at [facility] dialysis unit.  From 

nephrology standpoint I think he can be safely discharged home tomorrow 

morning.  Patient's next dialysis will be on Wednesday at [facility] 

unit at [facility].  Patient instructed to be there at 10 AM on 

Wednesday for consents and dialysis afterwards.








(3) Polycystic kidney disease


Is this a current diagnosis for this admission?: Yes   





(4) Anemia in chronic kidney disease (CKD)


Is this a current diagnosis for this admission?: Yes   


Plan: 


We will check iron panel.  His ferritin is mildly low but the rest of the iron 

indices are acceptable.  We will give Procrit during dialysis.








(5) Hypertension


Qualifiers: 


   Hypertension type: essential hypertension   Qualified Code(s): I10 - 

Essential (primary) hypertension   


Is this a current diagnosis for this admission?: Yes   


Plan: 


Resume all home blood pressure medications including diuretics.








(6) Cellulitis


Qualifiers: 


   Site of cellulitis: extremity   Site of cellulitis of extremity: lower 

extremity   Laterality: left   Qualified Code(s): L03.116 - Cellulitis of left 

lower limb   


Is this a current diagnosis for this admission?: Yes   


Plan: 


Patient needs IV antibiotics as initiated per primary service.  This is 

associated with pruritic rash which could be contributed by acute to uremia.  

Currently clinically improving.  Since cultures are negative I would 

discontinue vancomycin.








(7) Hyperphosphatemia


Is this a current diagnosis for this admission?: Yes   


Plan: 


Low phosphorus diet








(8) Secondary hyperparathyroidism (of renal origin)


Is this a current diagnosis for this admission?: Yes   


Plan: 


We will give Zemplar IV during dialysis treatment for the successive dialysis 

treatments.  For now we will continue oral calcitriol.








- Time


Time with patient: 15-25 minutes

## 2017-12-12 VITALS — DIASTOLIC BLOOD PRESSURE: 67 MMHG | SYSTOLIC BLOOD PRESSURE: 131 MMHG

## 2017-12-12 RX ADMIN — ASCORBIC ACID, FOLIC ACID, NIACIN, THIAMINE, RIBOFLAVIN, PYRIDOXINE, CYANOCOBALAMIN, PANTOTHENIC ACID, BIOTIN SCH CAP: 100; 1.5; 1.7; 20; 10; 1; 6; 150; 5 CAPSULE, LIQUID FILLED ORAL at 16:18

## 2017-12-12 RX ADMIN — Medication SCH ML: at 05:43

## 2017-12-12 RX ADMIN — FERROUS SULFATE TAB 325 MG (65 MG ELEMENTAL FE) SCH MG: 325 (65 FE) TAB at 11:36

## 2017-12-12 RX ADMIN — CARVEDILOL SCH MG: 6.25 TABLET, FILM COATED ORAL at 11:35

## 2017-12-12 RX ADMIN — HEPARIN SODIUM SCH UNIT: 5000 INJECTION, SOLUTION INTRAVENOUS; SUBCUTANEOUS at 05:42

## 2017-12-12 RX ADMIN — FUROSEMIDE SCH MG: 20 TABLET ORAL at 11:38

## 2017-12-12 RX ADMIN — Medication SCH UNIT: at 05:43

## 2017-12-12 RX ADMIN — PROBIOTIC PRODUCT - TAB SCH MG: TAB at 11:36

## 2017-12-12 RX ADMIN — Medication SCH UNIT: at 14:37

## 2017-12-12 RX ADMIN — HEPARIN SODIUM SCH UNIT: 5000 INJECTION, SOLUTION INTRAVENOUS; SUBCUTANEOUS at 14:37

## 2017-12-12 RX ADMIN — Medication SCH ML: at 14:37

## 2017-12-12 RX ADMIN — ASPIRIN SCH MG: 81 TABLET, COATED ORAL at 11:35

## 2017-12-12 RX ADMIN — PREDNISONE SCH MG: 20 TABLET ORAL at 11:36

## 2017-12-12 RX ADMIN — OMEGA-3-ACID ETHYL ESTERS SCH GM: 900 CAPSULE ORAL at 07:33

## 2017-12-12 RX ADMIN — MULTIVITAMIN TABLET SCH TAB: TABLET at 11:37

## 2017-12-12 RX ADMIN — AMLODIPINE BESYLATE SCH MG: 10 TABLET ORAL at 11:37

## 2017-12-12 NOTE — PDOC DISCHARGE SUMMARY
General





- Admit/Disc Date/PCP


Admission Date/Primary Care Provider: 


  12/04/17 23:28





  





Discharge Date: 12/12/17





- Discharge Diagnosis


(1) End stage renal disease


Is this a current diagnosis for this admission?: Yes   


Summary: 


We will start dialysis as an outpatient








(2) Hypertension


Is this a current diagnosis for this admission?: Yes   


Summary: 


Continue current medications








(3) Polycystic kidney disease


Is this a current diagnosis for this admission?: Yes   


Summary: 


Follow-up with the transplant team in [place]








(4) Cellulitis of left lower extremity


Is this a current diagnosis for this admission?: Yes   


Summary: 


Stop the antibiotics








(5) Uremia, acute


Is this a current diagnosis for this admission?: Yes   


Summary: 


Proceed with dialysis as an outpatient.








- Additional Information


Resuscitation Status: Do Not Resuscitate


Discharge Diet: As Tolerated


Discharge Activity: Activity As Tolerated


Home Medications: 








Amlodipine Besylate [Norvasc 10 mg Tablet] 10 mg PO DAILY 12/05/17 


Aspirin [Ecotrin 81 mg EC Tablet] 81 mg PO DAILY 12/05/17 


Calcitriol [Rocaltrol 0.25 mcg Capsule] 0.25 mcg PO .CLARIFY 12/05/17 


Carvedilol [Coreg 6.25 mg Tablet] 6.25 mg PO Q12 12/05/17 


Ferrous Sulfate [Iron] 325 mg PO DAILY 12/05/17 


Fexofenadine/Pseudoephedrine [Allegra-D 24 Hour Tablet] 2 tab PO DAILY 12/05/17 


L.acidoph,Paracasei, B.lactis [Probiotic] 1 cap PO DAILY 12/05/17 


Lysine 1 tab PO DAILY 12/05/17 


Multivit-Min/FA/Lycopen/Lutein [Centrum Silver Men Tablet] 1 tab PO DAILY 12/05/ 17 


Omega-3 Fatty Acids/Fish Oil [Omega 3 Fish Oil Softgel] 1 cap PO DAILY 12/05/17 











History of Present Illness


History of Present Illness: 


ZAKIA GOODWIN is a 67 year old male








Hospital Course


Hospital Course: 





The patient was admitted with a uremic syndrome and end-stage renal disease.  

He had a femoral catheter placed and underwent 3 courses of hemodialysis.  He 

is tolerating antibiotics well.  He had a PermCath placed by Dr. Son which 

he tolerated well.  Is presently scheduled for dialysis as an outpatient.





Physical Exam


Vital Signs: 


 











Temp Pulse Resp BP Pulse Ox


 


 98.2 F   67   17   125/70   96 


 


 12/12/17 03:27  12/12/17 03:27  12/12/17 03:27  12/12/17 03:27  12/12/17 03:27








 Intake & Output











 12/11/17 12/12/17 12/13/17





 06:59 06:59 06:59


 


Intake Total 890 988 


 


Output Total 700 2100 


 


Balance 190 -1112 


 


Weight 133.1 kg 129.2 kg 











General appearance: PRESENT: no acute distress


Head exam: PRESENT: atraumatic


Eye exam: PRESENT: conjunctiva pink


Neck exam: ABSENT: carotid bruit, JVD


Respiratory exam: PRESENT: crackles


Cardiovascular exam: PRESENT: RRR, +S1, +S2


Pulses: PRESENT: +1 pedal pulses bilateral


GI/Abdominal exam: PRESENT: normal bowel sounds, soft


Extremities exam: PRESENT: full ROM


Musculoskeletal exam: PRESENT: ambulatory


Neurological exam: PRESENT: alert, awake





Results


Laboratory Results: 


 





 12/11/17 07:06 





 12/11/17 07:06 





 











  12/11/17 12/11/17





  07:06 07:06


 


WBC  11.3 H 


 


RBC  3.00 L 


 


Hgb  9.8 L 


 


Hct  28.8 L 


 


MCV  96 


 


MCH  32.7 


 


MCHC  34.0 


 


RDW  14.0 


 


Plt Count  227 


 


Sodium   143.1


 


Potassium   4.2


 


Chloride   103


 


Carbon Dioxide   22


 


Anion Gap   18


 


BUN   73 H


 


Creatinine   5.29 H


 


Est GFR ( Amer)   13 L


 


Est GFR (Non-Af Amer)   11 L


 


Glucose   95


 


Calcium   8.8











Impressions: 


 





Central Venous Line  12/11/17 00:00


IMPRESSION:  Intra procedural imaging and fluoro


 








Guidance Fluoroscopy  12/11/17 00:00


IMPRESSION:  Intra procedural imaging and fluoro

## 2018-01-16 ENCOUNTER — HOSPITAL ENCOUNTER (OUTPATIENT)
Dept: HOSPITAL 62 - OROUT | Age: 68
Discharge: HOME | End: 2018-01-16
Attending: SURGERY
Payer: MEDICARE

## 2018-01-16 VITALS — DIASTOLIC BLOOD PRESSURE: 80 MMHG | SYSTOLIC BLOOD PRESSURE: 414 MMHG

## 2018-01-16 DIAGNOSIS — D63.1: ICD-10-CM

## 2018-01-16 DIAGNOSIS — E78.1: ICD-10-CM

## 2018-01-16 DIAGNOSIS — R80.9: ICD-10-CM

## 2018-01-16 DIAGNOSIS — E87.0: ICD-10-CM

## 2018-01-16 DIAGNOSIS — Z79.82: ICD-10-CM

## 2018-01-16 DIAGNOSIS — E87.2: ICD-10-CM

## 2018-01-16 DIAGNOSIS — Z99.2: ICD-10-CM

## 2018-01-16 DIAGNOSIS — N18.6: ICD-10-CM

## 2018-01-16 DIAGNOSIS — Z79.899: ICD-10-CM

## 2018-01-16 DIAGNOSIS — Q61.2: ICD-10-CM

## 2018-01-16 DIAGNOSIS — Z86.73: ICD-10-CM

## 2018-01-16 DIAGNOSIS — E21.3: ICD-10-CM

## 2018-01-16 DIAGNOSIS — N25.81: ICD-10-CM

## 2018-01-16 DIAGNOSIS — E78.00: ICD-10-CM

## 2018-01-16 DIAGNOSIS — E78.5: ICD-10-CM

## 2018-01-16 DIAGNOSIS — Z88.0: ICD-10-CM

## 2018-01-16 DIAGNOSIS — C44.91: ICD-10-CM

## 2018-01-16 DIAGNOSIS — E87.5: ICD-10-CM

## 2018-01-16 DIAGNOSIS — M19.90: ICD-10-CM

## 2018-01-16 DIAGNOSIS — I12.0: Primary | ICD-10-CM

## 2018-01-16 LAB
ADD MANUAL DIFF: NO
ANION GAP SERPL CALC-SCNC: 14 MMOL/L (ref 5–19)
BASOPHILS # BLD AUTO: 0.1 10^3/UL (ref 0–0.2)
BASOPHILS NFR BLD AUTO: 1.5 % (ref 0–2)
BUN SERPL-MCNC: 26 MG/DL (ref 7–20)
CALCIUM: 9.6 MG/DL (ref 8.4–10.2)
CHLORIDE SERPL-SCNC: 107 MMOL/L (ref 98–107)
CO2 SERPL-SCNC: 27 MMOL/L (ref 22–30)
EOSINOPHIL # BLD AUTO: 0.4 10^3/UL (ref 0–0.6)
EOSINOPHIL NFR BLD AUTO: 8.8 % (ref 0–6)
ERYTHROCYTE [DISTWIDTH] IN BLOOD BY AUTOMATED COUNT: 15.2 % (ref 11.5–14)
GLUCOSE SERPL-MCNC: 106 MG/DL (ref 75–110)
HCT VFR BLD CALC: 34 % (ref 37.9–51)
HGB BLD-MCNC: 11.4 G/DL (ref 13.5–17)
LYMPHOCYTES # BLD AUTO: 0.9 10^3/UL (ref 0.5–4.7)
LYMPHOCYTES NFR BLD AUTO: 21.1 % (ref 13–45)
MCH RBC QN AUTO: 32.3 PG (ref 27–33.4)
MCHC RBC AUTO-ENTMCNC: 33.5 G/DL (ref 32–36)
MCV RBC AUTO: 97 FL (ref 80–97)
MONOCYTES # BLD AUTO: 0.6 10^3/UL (ref 0.1–1.4)
MONOCYTES NFR BLD AUTO: 14.1 % (ref 3–13)
NEUTROPHILS # BLD AUTO: 2.4 10^3/UL (ref 1.7–8.2)
NEUTS SEG NFR BLD AUTO: 54.5 % (ref 42–78)
PLATELET # BLD: 264 10^3/UL (ref 150–450)
POTASSIUM SERPL-SCNC: 5.9 MMOL/L (ref 3.6–5)
RBC # BLD AUTO: 3.53 10^6/UL (ref 4.35–5.55)
SODIUM SERPL-SCNC: 147.5 MMOL/L (ref 137–145)
TOTAL CELLS COUNTED % (AUTO): 100 %
WBC # BLD AUTO: 4.3 10^3/UL (ref 4–10.5)

## 2018-01-16 PROCEDURE — 93005 ELECTROCARDIOGRAM TRACING: CPT

## 2018-01-16 PROCEDURE — 0WHG43Z INSERTION OF INFUSION DEVICE INTO PERITONEAL CAVITY, PERCUTANEOUS ENDOSCOPIC APPROACH: ICD-10-PCS | Performed by: SURGERY

## 2018-01-16 PROCEDURE — 80048 BASIC METABOLIC PNL TOTAL CA: CPT

## 2018-01-16 PROCEDURE — 85025 COMPLETE CBC W/AUTO DIFF WBC: CPT

## 2018-01-16 PROCEDURE — 36415 COLL VENOUS BLD VENIPUNCTURE: CPT

## 2018-01-16 PROCEDURE — 94640 AIRWAY INHALATION TREATMENT: CPT

## 2018-01-16 PROCEDURE — 49324 LAP INSERT TUNNEL IP CATH: CPT

## 2018-01-16 PROCEDURE — 84132 ASSAY OF SERUM POTASSIUM: CPT

## 2018-01-16 PROCEDURE — 93010 ELECTROCARDIOGRAM REPORT: CPT

## 2018-01-16 NOTE — OPERATIVE REPORT
Operative Report


DATE OF SURGERY: 01/16/18


PREOPERATIVE DIAGNOSIS: #1 end-stage renal disease on hemodialysis.  2.  

PermCath in place.  3.  Increased body mass index.  4.  History of TIA.  5.  

Secondary hyperparathyroidism.


POSTOPERATIVE DIAGNOSIS: #1 end-stage renal disease on hemodialysis.  2.  

PermCath in place.  3.  Increased body mass index.  4.  History of TIA.  5.  

Secondary hyperparathyroidism.


OPERATION: Laparoscopic insertion of peritoneal dialysis catheter.


SURGEON: LENNOX WILLIAMS 1ST ASSISTANT: BARBARA MCMAHON


ANESTHESIA: GA


TISSUE REMOVED OR ALTERED: Not applicable.


COMPLICATIONS: 





None.


ESTIMATED BLOOD LOSS: 10 mL.


INTRAOPERATIVE FINDINGS: Of a satisfactory abdominal cavity no significant 

adhesions, no obvious hernia.  The peritoneal dialysis catheter was placed well 

down in the pelvis, at about the level of the sigmoid rectum junction.  The 

omentum in this case was not intrusive.  1000 L of heparinized saline were 

instilled easily and 500 retrieved.  The catheter function seems satisfactory 

and appropriate.


PROCEDURE: 





After obtaining informed consent and going over the procedure with [the patient 

], he was taken to the operating room, [he was] anesthetized and intubated.  

The abdomen was prepped and draped in the usual sterile fashion. After the 

universal timeout, in which it was verified that the patient received IV 

antibiotic, the procedure commenced.





The topographical location for the peritoneal dialysis catheter was sketched by 

applying it to the anterior abdominal wall.  The reference point was the pubic 

symphysis the coil of the catheter, just beneath this level.  In this way the 

position for the cuffs and the external catheter exit were ascertained and 

marked.  The catheter was now replaced in antibiotic containing solution.  An 

entry into the abdomen was sketched just to the right of the midline and 

transversely in the epigastrium.  Local anesthesia was infiltrated.  A 1 cm, 

transverse incision was made with a [15 blade scalpel].  Dissection now 

proceeded to the medial aspect of the right rectus sheath.  This was opened and 

the muscle gently reflected.  The posterior rectus sheath and peritoneum were 

opened between hemostats and entry was gained to the peritoneal cavity.  This 

allowed introduction of a 5 mm laparoscopic port.  The abdomen was now 

insufflated with carbon dioxide up to a maximum pressure of 12 mm of mercury.  

The camera was inserted and a good view gained of the abdomen.  Photographs 

were taken.  Local anesthesia was now infiltrated and an incision made in 

respect to the curve of the catheter.  A 1 cm transverse incision was made at 

this point and dissection proceeded down to the rectus sheath.  This was opened 

and a Veress needle on a reducing sleeve were were now introduced through the 

rectus muscle and the manipulated down to about 4 cm inferior to the incision.  

The peritoneum was now entered and the Veress needle removed.  The internal 

cannula was now placed under direct vision.  A swan neck peritoneal dialysis 

catheter was now placed on a stylette.  Great care was taken to keep the 

orientation in reference to the white line on the catheter.  It was now 

inserted into the peritoneal cavity under direct vision, through the 

introducer.  As the catheter entered the abdomen the stylette was slowly 

withdrawn allowing it to assume its normal orientation and shape within the 

peritoneal cavity.  Both the stylet and introducer were removed so as to place 

the internal cuff about 3 cm from the entry point of the peritoneal cavity, and 

within the rectus sheath.  This was verified with respect to the incision.  The 

external curve of the catheter was allowed to form precisely at the level of 

the incision.








 Externally the catheter was affixed to a Brenda stylette which was now used to 

tunnel the catheter in the subcutaneous tissues to its exit site where it was 

now used to exit the skin.  The catheter orientation and position and, 

particularly the 2 cuffs of the catheter were verified.  Once this was done the 

external portion of the catheter was affixed to a Leur lock adapter and 

connected to a sterile IV tubing.  This allowed introduction of 1 L of 

heparinized saline into the peritoneal cavity via the catheter.  This occurred 

with brisk and free flow of fluid into the peritoneal cavity.  Once the entire 

liter had been infused, the bag was now placed beneath the level of the patient 

and very satisfactory outflow was observed.  


With this in place, the camera and the catheter were removed and abdomen 

desufflated.  The subcutaneous tissue in each incision was closed with 

interrupted 3-0 PDS.  The skin in each incision was closed using interrupted 

and continuous sutures of 4-0 Monocryl.  Once about 800 mils of the Infusaid 

had been passively removed from the abdomen, the catheter was flushed with 10 

mL of heparinized solution and capped.  The  bio a patch was applied at the 

exit site.  Benzoin was applied and Steri-Strips used to reinforce each of the 

wounds.  It was also used to help anchor the Biopatch.  It was also used to 

anchor the main catheter so that any external pressure would not dislodge the 

catheter.  Dry gauze and tape applied and the procedure concluded The first 

assistant provided retraction, thus facilitating the operative view.  

Controlled bleeding. 


The first assistant also followed the suturing, thus facilitating accurate 

suture placement.


 Sutured skin and applied dressings.

## 2018-01-16 NOTE — PDOC DISCHARGE SUMMARY
Discharge Summary (SDC)





- Discharge


Final Diagnosis: 





#1 end-stage renal disease on hemodialysis.





2.  PermCath in place.





3.  Increased body mass index.





4.  History of TIA.





5.  Secondary hyperparathyroidism.


Date of Surgery: 01/16/18


Discharge Date: 01/16/18


Condition: Good


Treatment or Instructions: 


Discharge home [after recovery per ASU criteria].


Diet , [renal],as tolerated, when fully awake advance as tolerated. 


Activities within moderation encouraged.


Follow up in my office by appointment in about [1 week]. Call for appointment.


Leave wounds [covered], [keep clean and dry, until office visit in 1 week].


Meds per med rec.  Percocet prescription.


Hold of on school/work [until evaluation in office].


May shower [in 48 hrs], [try to keep operated area as dry as possible].


Prescriptions: 


Oxycodone HCl/Acetaminophen [Percocet 5-325 mg Tablet] 1 tab PO ASDIR PRN #15 

tab


 PRN Reason: 


Referrals: 


MELISSA WOOTEN MD [Primary Care Provider] -

## 2018-01-16 NOTE — EKG REPORT
SEVERITY:- ABNORMAL ECG -

SINUS RHYTHM

RBBB AND LAFB

:

Confirmed by: Beena Melgar 16-Jan-2018 09:48:05

## 2018-02-05 ENCOUNTER — HOSPITAL ENCOUNTER (OUTPATIENT)
Dept: HOSPITAL 62 - CCL | Age: 68
Discharge: HOME | End: 2018-02-05
Attending: SURGERY
Payer: MEDICARE

## 2018-02-05 VITALS — DIASTOLIC BLOOD PRESSURE: 90 MMHG | SYSTOLIC BLOOD PRESSURE: 142 MMHG
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
## 2018-02-05 DIAGNOSIS — E87.0: ICD-10-CM

## 2018-02-05 DIAGNOSIS — E87.2: ICD-10-CM

## 2018-02-05 DIAGNOSIS — M19.90: ICD-10-CM

## 2018-02-05 DIAGNOSIS — E78.1: ICD-10-CM

## 2018-02-05 DIAGNOSIS — I12.0: ICD-10-CM

## 2018-02-05 DIAGNOSIS — Q61.2: ICD-10-CM

## 2018-02-05 DIAGNOSIS — N25.81: ICD-10-CM

## 2018-02-05 DIAGNOSIS — Z86.73: ICD-10-CM

## 2018-02-05 DIAGNOSIS — D63.1: ICD-10-CM

## 2018-02-05 DIAGNOSIS — Z99.2: ICD-10-CM

## 2018-02-05 DIAGNOSIS — E83.39: ICD-10-CM

## 2018-02-05 DIAGNOSIS — E78.00: ICD-10-CM

## 2018-02-05 DIAGNOSIS — Z79.82: ICD-10-CM

## 2018-02-05 DIAGNOSIS — Z85.828: ICD-10-CM

## 2018-02-05 DIAGNOSIS — E78.5: ICD-10-CM

## 2018-02-05 DIAGNOSIS — N18.6: ICD-10-CM

## 2018-02-05 DIAGNOSIS — Z88.0: ICD-10-CM

## 2018-02-05 DIAGNOSIS — E87.5: ICD-10-CM

## 2018-02-05 DIAGNOSIS — R80.9: ICD-10-CM

## 2018-02-05 DIAGNOSIS — T82.858A: Primary | ICD-10-CM

## 2018-02-05 LAB
ANION GAP SERPL CALC-SCNC: 13 MMOL/L (ref 5–19)
BUN SERPL-MCNC: 51 MG/DL (ref 7–20)
CALCIUM: 10.2 MG/DL (ref 8.4–10.2)
CHLORIDE SERPL-SCNC: 114 MMOL/L (ref 98–107)
CO2 SERPL-SCNC: 22 MMOL/L (ref 22–30)
ERYTHROCYTE [DISTWIDTH] IN BLOOD BY AUTOMATED COUNT: 14.9 % (ref 11.5–14)
GLUCOSE SERPL-MCNC: 90 MG/DL (ref 75–110)
HCT VFR BLD CALC: 36.7 % (ref 37.9–51)
HGB BLD-MCNC: 12.3 G/DL (ref 13.5–17)
MCH RBC QN AUTO: 32 PG (ref 27–33.4)
MCHC RBC AUTO-ENTMCNC: 33.4 G/DL (ref 32–36)
MCV RBC AUTO: 96 FL (ref 80–97)
PLATELET # BLD: 300 10^3/UL (ref 150–450)
POTASSIUM SERPL-SCNC: 5.5 MMOL/L (ref 3.6–5)
RBC # BLD AUTO: 3.83 10^6/UL (ref 4.35–5.55)
SODIUM SERPL-SCNC: 149.3 MMOL/L (ref 137–145)
WBC # BLD AUTO: 5.5 10^3/UL (ref 4–10.5)

## 2018-02-05 PROCEDURE — C1769 GUIDE WIRE: HCPCS

## 2018-02-05 PROCEDURE — 36558 INSERT TUNNELED CV CATH: CPT

## 2018-02-05 PROCEDURE — 76937 US GUIDE VASCULAR ACCESS: CPT

## 2018-02-05 PROCEDURE — 05HN33Z INSERTION OF INFUSION DEVICE INTO LEFT INTERNAL JUGULAR VEIN, PERCUTANEOUS APPROACH: ICD-10-PCS | Performed by: SURGERY

## 2018-02-05 PROCEDURE — 80048 BASIC METABOLIC PNL TOTAL CA: CPT

## 2018-02-05 PROCEDURE — 85027 COMPLETE CBC AUTOMATED: CPT

## 2018-02-05 PROCEDURE — 77001 FLUOROGUIDE FOR VEIN DEVICE: CPT

## 2018-02-05 PROCEDURE — C1752 CATH,HEMODIALYSIS,SHORT-TERM: HCPCS

## 2018-02-05 PROCEDURE — 71045 X-RAY EXAM CHEST 1 VIEW: CPT

## 2018-02-05 PROCEDURE — 36415 COLL VENOUS BLD VENIPUNCTURE: CPT

## 2018-02-05 NOTE — PDOC DISCHARGE SUMMARY
Discharge Summary (SDC)





- Discharge


Final Diagnosis: 





#1 end-stage renal disease on hemodialysis.





2.  Increased body mass index.





3.  Hypertension.


Date of Surgery: 02/05/18


Discharge Date: 02/05/18


Condition: Good


Treatment or Instructions: 


Discharge home [after recovery per ASU criteria].


Diet , [renal],as tolerated, when fully awake advance as tolerated. 


Activities within moderation encouraged.


Follow up in my office by appointment in about [1 week]. Call for appointment.


Leave wounds [covered], [keep clean and dry, until office visit in 1 week].  


Hold of on school/work [until evaluation in office].


Meds per med rec.


May shower [in 48 hrs], [try to keep operated area as dry as possible].


Referrals: 


MELISSA WOOTEN MD [Primary Care Provider] - 


Discharge Diet: Other (Comments) - Renal.


Respiratory Treatments at Home: Deep Breathing/Coughing


Discharge Activity: Activity As Tolerated


Report the Following to Your Physician Immediately: Shortness of Breath, 

Unusual Bleeding

## 2018-02-05 NOTE — RADIOLOGY REPORT (SQ)
EXAM DESCRIPTION:  TUNNELED CENTRAL LINE



COMPLETED DATE/TIME:  2/5/2018 1:56 pm



REASON FOR STUDY:  T82.858A T82.858A  STENOSIS OF OTHER VASCULAR PROSTH DEV/GRFT, INIT



COMPARISON:  2/5/2018, 12/11/2017, 12/3/2017



FLUOROSCOPY TIME:  1.3 minutes

17 series of digital images saved to PACS.



TECHNIQUE:  Intra-operative images acquired during surgical procedure to evaluate progress.

NUMBER OF IMAGES: 17 series of digital images



LIMITATIONS:  None.



FINDINGS:  Intra procedural imaging and fluoro during placement left-sided central venous dialysis ca
theter with the tip in the right atrium.  Please see operative report for further details



IMPRESSION:  Intra procedural imaging and fluoro



COMMENT:  Quality :  Final reports for procedures using fluoroscopy that document radiation exp
osure indices, or exposure time and number of fluorographic images (if radiation exposure indices are
 not available)

Please consult full operative report of the attending physician for description of the procedure.



TECHNICAL DOCUMENTATION:  JOB ID:  [id number]

 2011 [employer]- All Rights Reserved

## 2018-02-05 NOTE — OPERATIVE REPORT
Operative Report


DATE OF SURGERY: 02/05/18


PREOPERATIVE DIAGNOSIS: #1 end-stage renal disease on hemodialysis.  2.  

Increased body mass index.  3.  Hypertension.


POSTOPERATIVE DIAGNOSIS: #1 end-stage renal disease on hemodialysis.  2.  

Increased body mass index.  3.  Hypertension.


OPERATION: 1.  Ultrasound evaluation of the left internal jugular vein.  2.  

Insertion of PermCath via real-time access in the left internal jugular vein.  

3.  Angiogram and interpretation.


SURGEON: LENNOX WILLIAMS 1ST ASSISTANT: None


ANESTHESIA: Moderate Sedation


TISSUE REMOVED OR ALTERED: Not applicable.


COMPLICATIONS: 





None.


ESTIMATED BLOOD LOSS: 5 mL.


INTRAOPERATIVE FINDINGS: Of a satisfactory left internal jugular vein.  The 

angles somewhat challenging in this patient.  Satisfactory position with the 

tip of the catheter just down in the right atrium.  Easy egress of blood and 

ingress of heparinized solution.  Smooth flow of contrast through the right 

atrium ventricle.


PROCEDURE: 








After obtaining informed consent, the patient was taken to the [Cath Lab] and 

positioned supine.  The left neck and chest were prepared with chlorhexidine 

and draped out with sterile linen.  After the " universal timeout", in which it 

was verified that the patient continued to receive antibiotic, the procedure 

commenced.  A steriley  sheathed ultrasound probe was used to evaluate the [

right internal jugular] vein.  Local anesthesia was infiltrated adjacent to the 

probe.  Access into the [right internal jugular] vein was obtained using a 

micropuncture needle, followed by micropuncture wire and then a micropuncture 

catheter.  This was followed by introduction of a 0.035 guidewire the tip of 

which was placed down into the inferior vena cava .  A 27 cm long [split 

catheter] was now positioned over the chest and an exit site marked and locally 

anesthetized ,the catheter was placed between the 2 incisions.  Proximally, the 

catheter was  now positioned using a peel-away sheath, after dilation.  Easy 

ingress of heparinized solution and egress of blood obtained through both 

ports.  A completion angiogram was done by injecting contrast.  The findings 

were as dictated.  The neck incision was now closed using interrupted 3-0 PDS 

to the subcutaneous tissues, the catheter was anchored at the exit site using 3-

0 PDS.  A Biopatch device was now placed adjacent to the catheter.  Dressings 

were applied and the procedure concluded. 








Exposure time: [1.3 minutes].





Exposure: 27.4 Chrissy gray.





Contrast amount: [5 mL] of Isovue-M-300 low osmolality.








 Copies of the dictated operative report for Dr. Lennox Williams MD.concluded.  

Copies of the dictated operative report for Dr. Lennox Williams MD.

## 2018-03-01 ENCOUNTER — HOSPITAL ENCOUNTER (OUTPATIENT)
Dept: HOSPITAL 62 - OD | Age: 68
End: 2018-03-01
Attending: INTERNAL MEDICINE
Payer: MEDICARE

## 2018-03-01 DIAGNOSIS — R07.9: Primary | ICD-10-CM

## 2018-03-01 PROCEDURE — 93010 ELECTROCARDIOGRAM REPORT: CPT

## 2018-03-01 PROCEDURE — 93005 ELECTROCARDIOGRAM TRACING: CPT

## 2018-03-01 NOTE — EKG REPORT
SEVERITY:- ABNORMAL ECG -

SINUS RHYTHM

RBBB AND LAFB

:

Confirmed by: Chandler Espinal MD 01-Mar-2018 13:05:09

## 2018-09-11 NOTE — XCELERA REPORT
[address] [address]

                               Tel: [phone]

                               Fax: [phone]



                      Lower Extremity Arterial Evaluation

_______________________________________________________________________________



Name: ZAKIA GOODWIN

MRN: [id number]                                         Age: 68 yrs

Gender: Male                                            : 1950

Patient Status: Outpatient                              Patient Location: [facility]

Account #: [id number]

Study Date: 09/10/2018 03:01 PM

                             Accession #: [id number]

_______________________________________________________________________________

Procedure: A color flow and duplex scan of the lower extremity arteries was

performed bilaterally with velocity and waveform analysis. Ankle brachial

indicies performed.

Reason For Study: BLE PAIN







Ordering Physician: XIMENA TONY

Performed By: Curry Delaney

_______________________________________________________________________________

_______________________________________________________________________________





Measurements and Calculations



                                     Right  Left

                     CFA PSV         130.4 157.1 cm/sec

                     Prox PFA PSV   -110.3 -102.7cm/sec

                     Prox SFA PSV    115.2 134.4 cm/sec

                     Mid SFA PSV     -88.0 -101.3cm/sec

                     Dist SFA PSV    -92.6 -85.5 cm/sec

                     Prox Pop A PSV  75.1   91.0 cm/sec

                     Dist SHREE PSV    91.5   78.1 cm/sec

                     Dist PTA PSV    96.5   99.8 cm/sec

                     Koko Pedis PSV  -222.3 -243.6cm/sec



_______________________________________________________________________________

Right Side Arterial Evaluation

Normal velocity and triphasic waveforms noted from the Common Femoral artery

to the infrageniculate vessels. Increased velocity, very normal waveform at

the Dorsalis Pedis.



0-19% stenosis at the Dorsalis Pedis. or variant of normal.



Ankle Brachial index is 1.19.



Left Side Arterial Evaluation

Normal velocity and triphasic waveforms noted from the Common Femoral artery

to the infrageniculate vessels. Increased velocity, very normal waveform at

the Dorsalis Pedis.



0-19% stenosis at the Dorsalis Pedis. or variant of normal.



Ankle Brachial index is 1.16.



_______________________________________________________________________________

Interpretation Summary

Mild hemodynamically significant lesions in the bilateral lower extremities,

on duplex imaging, at rest. Findings in Dorsalis Pedis may be a variant of

normal.

_______________________________________________________________________________

Electronically signed by:      Lennox Williams      on 2018 08:21 AM



CC: XIMENA TONY

>

Williams, Lennox

## 2019-02-19 NOTE — RADIOLOGY REPORT (SQ)
EXAM DESCRIPTION:  CHEST PA/LATERAL



COMPLETED DATE/TIME:  2/19/2019 10:06 am



REASON FOR STUDY:  ENCNTR FOR GENERAL ADULT MEDICAL EXAM W/O ABNORMAL FINDINGS



COMPARISON:  2/5/2018.



EXAM PARAMETERS:  NUMBER OF VIEWS: two views

TECHNIQUE: Digital Frontal and Lateral radiographic views of the chest acquired.

RADIATION DOSE: NA

LIMITATIONS: none



FINDINGS:  LUNGS AND PLEURA: No opacities, masses or pneumothorax. No pleural effusion.

MEDIASTINUM AND HILAR STRUCTURES: No masses or contour abnormalities.

HEART AND VASCULAR STRUCTURES: Heart normal size.  No evidence for failure.

BONES: No acute findings.

HARDWARE: None in the chest.

OTHER: No other significant finding.



IMPRESSION:  NO SIGNIFICANT RADIOGRAPHIC FINDING IN THE CHEST.



TECHNICAL DOCUMENTATION:  JOB ID:  [id number]

 2011 [employer]- All Rights Reserved



Reading location - IP/workstation name: [email or web address]

## 2019-11-14 NOTE — RADIOLOGY REPORT (SQ)
EXAM DESCRIPTION:  CHEST SINGLE VIEW



COMPLETED DATE/TIME:  11/14/2019 9:37 am



REASON FOR STUDY:  preop



COMPARISON:  2/19/2019



EXAM PARAMETERS:  NUMBER OF VIEWS: One view.

TECHNIQUE: Single frontal radiographic view of the chest acquired.

RADIATION DOSE: NA

LIMITATIONS: None.



FINDINGS:  LUNGS AND PLEURA: No opacities, masses or pneumothorax. No pleural effusion.

MEDIASTINUM AND HILAR STRUCTURES: No masses.  Contour normal.

HEART AND VASCULAR STRUCTURES: Heart normal in size.  Normal vasculature.

BONES: No acute findings.

HARDWARE: None in the chest.

OTHER: No other significant finding.



IMPRESSION:  NO ACUTE RADIOGRAPHIC FINDING IN THE CHEST.



TECHNICAL DOCUMENTATION:  JOB ID:  [id number]

 2011 [employer]- All Rights Reserved



Reading location - IP/workstation name: [email or web address]

## 2019-11-14 NOTE — RADIOLOGY REPORT (SQ)
EXAM DESCRIPTION:  CHEST SINGLE VIEW



COMPLETED DATE/TIME:  11/14/2019 4:22 pm



REASON FOR STUDY:  pre op



COMPARISON:  11/14/2019



EXAM PARAMETERS:  NUMBER OF VIEWS: One view.

TECHNIQUE: Single frontal radiographic view of the chest acquired.

RADIATION DOSE: NA

LIMITATIONS: None.



FINDINGS:  LUNGS AND PLEURA: No opacities, masses or pneumothorax. No pleural effusion.

MEDIASTINUM AND HILAR STRUCTURES: No masses.  Contour normal.

HEART AND VASCULAR STRUCTURES: Heart normal in size.  Normal vasculature.

BONES: No acute findings.

HARDWARE: None in the chest.

OTHER: No other significant finding.



IMPRESSION:  NO ACUTE RADIOGRAPHIC FINDING IN THE CHEST.



TECHNICAL DOCUMENTATION:  JOB ID:  [id number]

 2011 [employer]- All Rights Reserved



Reading location - IP/workstation name: [email or web address]

## 2019-11-14 NOTE — PDOC H&P
History of Present Illness


Admission Date/PCP: 


  





  MELISSA WOOTEN MD





History of Present Illness: 


ZAKIA GOODWIN is a 69 year old male we were asked to see for medical 

management turning persistent vomiting, dehydration, and hypertension.  Patient 

was up in the endoscopy suite when I was consulted, he was initially scheduled 

for a colonoscopy and EGD however they had difficulty starting an IV on him.  

This morning around 0 400 he vomited 3 times and then this afternoon he is vom

ited 3 times.  Also last night during his usual peritoneal dialysis he said he 

edita off about 1400 cc of fluid it is felt as though he may be dehydrated this 

time..





Currently patient's vital signs are stable blood pressure 139/76 O2 sat 100% on 

2 L nasal cannula heart rate of 90.  Patient appears to be hemodynamically 

stable.  Patient will be put on IMCU, observation status.





I think the safest thing for the patient at this time is to postpone the 

procedure today, putting him in the hospital overnight observation status, 

hydrate the patient and give him antiemetics.  Reschedule the patient for 

tomorrow morning in the main OR, hopefully discharge the patient following the 

procedure.  





I have called Dr. Cardenas and discussed this with him.  He is agreeable to this

treatment plan will handle the patient's diet tonight prior to procedure 

tomorrow morning.  We will also reschedule the procedure in the main OR tomorrow

morning.





I have also called Dr. Delvalle, who manages the patient's peritoneal dialysis 

treatments.  She is in agreement to assisting with the management.  She also 

feels it would be prudent for the patient to take 1 of his Septra's tonight as 

prophylaxis the invasive procedure tomorrow morning.  Patient took these last 

night and this morning but due to the peritoneal dialysis last night as well as 

vomiting today medication is probably no longer effective.











Past Medical History


Cardiac Medical History: Reports: Hyperlipidema, Hypertension


   Denies: Coronary Artery Disease, Myocardial Infarction


Pulmonary Medical History: 


   Denies: Asthma, Bronchitis, Chronic Obstructive Pulmonary Disease (COPD) - 

SOB, Pneumonia


Neurological Medical History: 


   Denies: Seizures


Renal/ Medical History: Reports: End Stage Renal Disease


Malignancy Medical History: Reports: Skin Cancer - Left ear basal cell carcinoma


GI Medical History: 


   Denies: Hepatitis, Hiatal Hernia


Musculoskeltal Medical History: Reports: Other - Back surgery x1


   Denies: Arthritis


Hematology: Reports: Anemia - HGB 9.3


   Denies: Sickle Cell Disease





Past Surgical History


Past Surgical History: Reports: Orthopedic Surgery - Back surgery


   Denies: Pacemaker





Social History


Smoking Status: Unknown if Ever Smoked


Frequency of Alcohol Use: Rare


Hx Recreational Drug Use: No


Hx Prescription Drug Abuse: No





- Advance Directive


Resuscitation Status: Full Code





Family History


Family History: Reviewed & Not Pertinent


Parental Family History Reviewed: No


Children Family History Reviewed: No


Sibling(s) Family History Reviewed.: No





Medication/Allergy


Home Medications: 








Amlodipine Besylate [Norvasc 10 mg Tablet] 10 mg PO DAILY 12/05/17 


Aspirin [Ecotrin 81 mg EC Tablet] 81 mg PO DAILY 12/05/17 


Calcitriol [Rocaltrol 0.25 mcg Capsule] 0.25 mcg PO DAILY 12/05/17 


Carvedilol [Coreg 6.25 mg Tablet] 6.25 mg PO Q12 12/05/17 


L.acidoph,Paracasei, B.lactis [Probiotic] 1 cap PO DAILY 12/05/17 


Omega-3 Fatty Acids/Fish Oil [Omega 3 Fish Oil Softgel] 1 cap PO DAILY 12/05/17 


Ferric Citrate [Auryxia] 210 mg PO TID 11/14/19 


Furosemide [Lasix 40 mg Tablet] 1 tab PO BID 11/14/19 


Lysine 1 tab PO DAILY 11/14/19 


Magnesium Oxide [Mag-Ox 400 mg Tablet] 400 mg PO DAILY 11/14/19 


Simvastatin 20 mg PO DAILY 11/14/19 








Allergies/Adverse Reactions: 


                                        





Penicillins Allergy (Verified 12/07/17 11:20)


   RASH











Review of Systems


Constitutional: ABSENT: chills, fever(s), headache(s), weight gain, weight loss


Respiratory: ABSENT: cough, hemoptysis


Gastrointestinal: PRESENT: vomiting.  ABSENT: abdominal pain, constipation, 

diarrhea, hematemesis, hematochezia, nausea


Neurological: ABSENT: abnormal gait, abnormal speech, confusion, dizziness, 

focal weakness, syncope


Psychiatric: ABSENT: anxiety, depression, homidical ideation, suicidal ideation





Physical Exam


Vital Signs: 


                                        











Temp Pulse Resp BP Pulse Ox


 


 97.5 F   78   17   135/78 H  100 


 


 11/14/19 08:47  11/14/19 08:47  11/14/19 08:47  11/14/19 08:47  11/14/19 08:47








                                 Intake & Output











 11/13/19 11/14/19 11/15/19





 06:59 06:59 06:59


 


Weight 132.45 kg  132.45 kg











General appearance: PRESENT: no acute distress, other - She had Zofran 1 hour 

prior to my arrival no further vomiting


Respiratory exam: PRESENT: clear to auscultation tasha.  ABSENT: rales, rhonchi, 

wheezes


Cardiovascular exam: PRESENT: RRR.  ABSENT: diastolic murmur, rubs, systolic 

murmur


GI/Abdominal exam: PRESENT: normal bowel sounds, soft.  ABSENT: distended, 

guarding, mass, organolmegaly, rebound, tenderness


Neurological exam: PRESENT: alert, awake, oriented to person, oriented to place,

oriented to time, oriented to situation, CN II-XII grossly intact.  ABSENT: 

motor sensory deficit


Psychiatric exam: PRESENT: appropriate affect, normal mood.  ABSENT: homicidal 

ideation, suicidal ideation





Results


Laboratory Results: 


                                        





                                 11/14/19 09:29 





                                 11/14/19 09:29 





                                        











  11/14/19 11/14/19





  09:29 09:29


 


WBC  7.0 


 


RBC  3.47 L 


 


Hgb  11.3 L 


 


Hct  34.2 L 


 


MCV  99 H 


 


MCH  32.7 


 


MCHC  33.2 


 


RDW  16.1 H 


 


Plt Count  364 


 


Sodium   144.1


 


Potassium   5.7 H


 


Chloride   103


 


Carbon Dioxide   22


 


Anion Gap   19


 


BUN   41 H


 


Creatinine   9.99 H


 


Est GFR (African Amer)   6 L


 


Glucose   115 H


 


Calcium   9.8











Impressions: 


                                        





Chest X-Ray  11/14/19 00:00


IMPRESSION:  NO ACUTE RADIOGRAPHIC FINDING IN THE CHEST.


 














Assessment and Plan





- Diagnosis


(1) Anemia in chronic kidney disease (CKD)


Is this a current diagnosis for this admission?: Yes   





(2) End stage renal disease


Is this a current diagnosis for this admission?: Yes   





(3) Hypertension


Qualifiers: 


   Hypertension type: essential hypertension   Qualified Code(s): I10 - 

Essential (primary) hypertension   


Is this a current diagnosis for this admission?: Yes   





(4) Polycystic kidney disease


Is this a current diagnosis for this admission?: Yes   





- Plan Summary


Summary: 


Patient will be put in the IMCU, overnight, observation status.  Patient will be

hydrated given supportive treatment.  Patient will be rescheduled for his 

procedures tomorrow morning.  Patient will be maintained with ice chips further 

direction by general surgery.


Patient appears to be medically stable





- Time


Time Spent with patient: 35 or more minutes

## 2019-11-15 NOTE — PDOC PROGRESS REPORT
Subjective


Progress Note for:: 11/15/19


Reason For Visit: 


PERITONEAL DIALYSIS, VOMITING, HYPERTENSION


11/15/2019


Scheduled for upper endoscopy and colonoscopy today





Physical Exam


Vital Signs: 


                                        











Temp Pulse Resp BP Pulse Ox


 


 97.8 F   94   20   127/68 H  99 


 


 11/15/19 08:10  11/15/19 08:10  11/15/19 08:10  11/15/19 08:10  11/15/19 08:10








                                 Intake & Output











 11/14/19 11/15/19 11/16/19





 06:59 06:59 06:59


 


Intake Total  5350 3592


 


Output Total  2630 2650


 


Balance  2720 942


 


Weight  125.3 kg 125.3 kg











General appearance: PRESENT: no acute distress, other - No further vomiting, 

patient on ice chips


Respiratory exam: PRESENT: clear to auscultation tasha.  ABSENT: rales, rhonchi, 

wheezes


Cardiovascular exam: PRESENT: RRR.  ABSENT: diastolic murmur, rubs, systolic mu

rmur


Neurological exam: PRESENT: alert, awake, oriented to person, oriented to place,

oriented to time, oriented to situation, CN II-XII grossly intact.  ABSENT: 

motor sensory deficit


Psychiatric exam: PRESENT: appropriate affect, normal mood.  ABSENT: homicidal 

ideation, suicidal ideation





Results


Laboratory Results: 


                                        





                                 11/15/19 07:06 





                                 11/15/19 07:06 





                                        











  11/14/19 11/15/19 11/15/19





  23:31 07:06 07:06


 


WBC   8.8 


 


RBC   3.28 L 


 


Hgb   10.8 L 


 


Hct   32.4 L 


 


MCV   99 H 


 


MCH   32.9 


 


MCHC   33.4 


 


RDW   15.7 H 


 


Plt Count   329 


 


Seg Neutrophils %   83.0 H 


 


Sodium    147.2 H


 


Potassium    5.2 H


 


Chloride    107


 


Carbon Dioxide    22


 


Anion Gap    18


 


BUN    44 H


 


Creatinine    10.21 H


 


Est GFR (African Amer)    6 L


 


Glucose    109


 


Calcium    9.3


 


Phosphorus    8.4 H


 


Magnesium    2.7 H


 


Total Bilirubin    0.3


 


AST    16 L


 


Alkaline Phosphatase    66


 


Total Protein    6.7


 


Albumin    3.9


 


Urine Color  YELLOW  


 


Urine Appearance  CLEAR  


 


Urine pH  7.0  


 


Ur Specific Gravity  1.018  


 


Urine Protein  100 H  


 


Urine Glucose (UA)  50 H  


 


Urine Ketones  NEGATIVE  


 


Urine Blood  NEGATIVE  


 


Urine Nitrite  NEGATIVE  


 


Ur Leukocyte Esterase  NEGATIVE  


 


Urine WBC (Auto)  1  











Impressions: 


                                        





Chest X-Ray  11/14/19 00:00


IMPRESSION:  NO ACUTE RADIOGRAPHIC FINDING IN THE CHEST.


 














Assessment and Plan





- Diagnosis


(1) Anemia in chronic kidney disease (CKD)


Is this a current diagnosis for this admission?: Yes   





(2) End stage renal disease


Is this a current diagnosis for this admission?: Yes   





(3) Hypertension


Qualifiers: 


   Hypertension type: essential hypertension   Qualified Code(s): I10 - 

Essential (primary) hypertension   


Is this a current diagnosis for this admission?: Yes   





(4) Polycystic kidney disease


Is this a current diagnosis for this admission?: Yes   





(5) Hyperkalemia


Is this a current diagnosis for this admission?: Yes   





(6) ESRD on peritoneal dialysis


Is this a current diagnosis for this admission?: Yes   





- Plan Summary


Summary: 


Patient will be put in the IMCU, overnight, observation status.  Patient will be

hydrated given supportive treatment.  Patient will be rescheduled for his 

procedures tomorrow morning.  Patient will be maintained with ice chips further 

direction by general surgery.


Patient appears to be medically stable





11/15/2019





Patient's vital signs through the night were good O2 sat 99% on 2 L nasal 

cannula patient was afebrile


Yesterday's potassium was slightly elevated at 5.7 today is 5.2


Recent is on peritoneal dialysis


Patient will take necessary medications with a sip of water, meds that are 

absolutely not necessary will be held until following his procedure


No further vomiting, IVF running 75ml/hour NaCl





- Time


Time Spent with patient: 15-24 minutes

## 2019-11-15 NOTE — OPERATIVE REPORT
Nonrecallable Operative Report


DATE OF SURGERY: 11/15/19


PREOPERATIVE DIAGNOSIS: Anemia Hemoccult positive stools


POSTOPERATIVE DIAGNOSIS: Anemia and Hemoccult-positive stools


OPERATION: Esophagogastroduodenoscopy and colonoscopy


SURGEON: GENNA MÉNDEZ


ANESTHESIA: LMAC


TISSUE REMOVED OR ALTERED: Gastric biopsy


COMPLICATIONS: 





None


ESTIMATED BLOOD LOSS: 0


INTRAOPERATIVE FINDINGS: See dictation


PROCEDURE: 





Brought to the operating room and awake alert stable condition given LMAC 

anesthesia he was placed in a left lateral decubitus position after appropriate 

timeout and site verification the procedure commenced





The Olympus gastroscope was utilized passed into the posterior pharynx past the 

upper constrictor muscles into the proximal esophagus as we traversed the 

esophagus we took note of whitish exudate along the walls esophagus consistent 

with candidiasis which the GE junction and passed the scope into the stomach the

scope was then passed through the stomach past the antrum  through the pylorus 

into the duodenum.  We reached the second portion of the duodenum.  As we slowly

withdrew the scope we noted that there was some mild duodenitis there is no 

evidence of any duodenal ulcerations or prepyloric or duodenal bulb ulcerations





Examination of the antrum revealed a granular appearance to the body the stomach

and this was biopsied.  There was some mild to moderate gastritis.  We slowly 

then withdrew the scope to the body the stomach we retroflexed the scope did not

note a significant hiatal hernia no mucosal abnormalities of the fundus or the 

cardia of the stomach the scope was then slowly withdrawn past the GE junction 

again noted with some mild distal esophagitis candidiasis of the of the 

esophagus the scope was slowly withdrawn.





Then using the Olympus colonoscope the second portion of the procedure 

commenced.





The Olympus colonoscope was passed into the rectum and easily traversed the 

sigmoid colon into the descending colon there was a moderate amount of firm dry 

appearing stool that somewhat obscured view but this was eventually washed away 

we then passed the scope up the descending colon to the splenic flexure 

transverse colon hepatic flexure ascending colon and then finally reaching the 

cecum we slowly withdrew the scope as we withdrew the scope we took note of 

normal mucosa of the ascending colon hepatic flexure transverse colon splenic 

flexure as the scope came down the descending colon we noted moderate to severe 

diverticulosis with a number of what appeared to be old blood clots within the 

diverticula as the scope was slowly withdrawn we noted more severe 

diverticulosis of the sigmoid colon on retroflexion the scope we noted grade 3 

hemorrhoids but no evidence of any bleeding there were no polyps or masses to 

speak of the entire colonoscopy.





Findings





1.  Candida esophagitis





2.  Distal esophagitis





3.  Granular appearance of the gastric mucosa biopsies obtained





4.  Mild esophagitis





5.  Moderate to severe sigmoid diverticulosis





6.  Grade 3 hemorrhoids.





Patient can be discharged home today and he can follow-up in surgical clinic for

the discussion

## 2019-11-15 NOTE — PDOC DISCHARGE SUMMARY
General





- Admit/Disc Date/PCP


Admission Date/Primary Care Provider: 


  11/14/19 15:21





  MELISSA WOOTEN MD





Discharge Date: 11/15/19





- Discharge Diagnosis


Final Diagnosis: 





Anemia and heme positive stools, dehydration





- Assessment


Summary: 


Patient will be put in the IMCU, overnight, observation status.  Patient will be

hydrated given supportive treatment.  Patient will be rescheduled for his 

procedures tomorrow morning.  Patient will be maintained with ice chips further 

direction by general surgery.


Patient appears to be medically stable





11/15/2019





Patient's vital signs through the night were good O2 sat 99% on 2 L nasal 

cannula patient was afebrile


Yesterday's potassium was slightly elevated at 5.7 today is 5.2


Recent is on peritoneal dialysis


Patient will take necessary medications with a sip of water, meds that are 

absolutely not necessary will be held until following his procedure


No further vomiting, IVF running 75ml/hour NaCl





- Additional Information


Resuscitation Status: Full Code


Discharge Diet: As Tolerated


Discharge Activity: Activity As Tolerated - Patient to be given a follow-up 

appointment in surgery clinic in 10 to 14 days


Referrals: 


MELISSA WOOTEN MD [Primary Care Provider] - 


Home Medications: 








Amlodipine Besylate [Norvasc 10 mg Tablet] 10 mg PO DAILY 12/05/17 


Aspirin [Ecotrin 81 mg EC Tablet] 81 mg PO DAILY 12/05/17 


Calcitriol [Rocaltrol 0.25 mcg Capsule] 0.25 mcg PO DAILY 12/05/17 


Carvedilol [Coreg 6.25 mg Tablet] 6.25 mg PO Q12 12/05/17 


L.acidoph,Paracasei, B.lactis [Probiotic] 1 cap PO DAILY 12/05/17 


Omega-3 Fatty Acids/Fish Oil [Omega 3 Fish Oil Softgel] 1 cap PO DAILY 12/05/17 


Ferric Citrate [Auryxia] 210 mg PO TID 11/14/19 


Furosemide [Lasix 40 mg Tablet] 1 tab PO BID 11/14/19 


Lysine 1 tab PO DAILY 11/14/19 


Magnesium Oxide [Mag-Ox 400 mg Tablet] 400 mg PO DAILY 11/14/19 


Simvastatin 20 mg PO DAILY 11/14/19 











History of Present Illiness


History of Present Illness: 


ZAKIA GOODWIN is a 69 year old male








Physical Exam


Vital Signs: 


                                        











Temp Pulse Resp BP Pulse Ox


 


 98.4 F   87   18   134/68 H  99 


 


 11/15/19 13:36  11/15/19 13:36  11/15/19 13:36  11/15/19 13:36  11/15/19 13:36








                                 Intake & Output











 11/14/19 11/15/19 11/16/19





 06:59 06:59 06:59


 


Intake Total  5350 3592


 


Output Total  2630 2650


 


Balance  2720 942


 


Weight  125.3 kg 125.3 kg














Results


Laboratory Results: 


                                        











WBC  8.8 10^3/uL (4.0-10.5)   11/15/19  07:06    


 


RBC  3.28 10^6/uL (4.35-5.55)  L  11/15/19  07:06    


 


Hgb  10.8 g/dL (13.5-17.0)  L  11/15/19  07:06    


 


Hct  32.4 % (37.9-51.0)  L  11/15/19  07:06    


 


MCV  99 fl (80-97)  H  11/15/19  07:06    


 


MCH  32.9 pg (27.0-33.4)   11/15/19  07:06    


 


MCHC  33.4 g/dL (32.0-36.0)   11/15/19  07:06    


 


RDW  15.7 % (11.5-14.0)  H  11/15/19  07:06    


 


Plt Count  329 10^3/uL (150-450)   11/15/19  07:06    


 


Lymph % (Auto)  6.4 % (13-45)  L  11/15/19  07:06    


 


Mono % (Auto)  9.7 % (3-13)   11/15/19  07:06    


 


Eos % (Auto)  0.4 % (0-6)   11/15/19  07:06    


 


Baso % (Auto)  0.5 % (0-2)   11/15/19  07:06    


 


Absolute Neuts (auto)  7.3 10^3/uL (1.7-8.2)   11/15/19  07:06    


 


Absolute Lymphs (auto)  0.6 10^3/uL (0.5-4.7)   11/15/19  07:06    


 


Absolute Monos (auto)  0.9 10^3/uL (0.1-1.4)   11/15/19  07:06    


 


Absolute Eos (auto)  0.0 10^3/uL (0.0-0.6)   11/15/19  07:06    


 


Absolute Basos (auto)  0.0 10^3/uL (0.0-0.2)   11/15/19  07:06    


 


Seg Neutrophils %  83.0 % (42-78)  H  11/15/19  07:06    


 


PT  14.3 SEC (11.4-15.4)   11/14/19  16:31    


 


INR  1.11   11/14/19  16:31    


 


APTT  36.6 SEC (23.5-35.8)  H  11/15/19  07:06    


 


Sodium  147.2 mmol/L (137-145)  H  11/15/19  07:06    


 


Potassium  5.2 mmol/L (3.6-5.0)  H  11/15/19  07:06    


 


Chloride  107 mmol/L ()   11/15/19  07:06    


 


Carbon Dioxide  22 mmol/L (22-30)   11/15/19  07:06    


 


Anion Gap  18  (5-19)   11/15/19  07:06    


 


BUN  44 mg/dL (7-20)  H  11/15/19  07:06    


 


Creatinine  10.21 mg/dL (0.52-1.25)  H  11/15/19  07:06    


 


Est GFR ( Amer)  6  (>60)  L  11/15/19  07:06    


 


Est GFR (MDRD) Non-Af  5  (>60)  L  11/15/19  07:06    


 


Glucose  109 mg/dL ()   11/15/19  07:06    


 


POC Glucose  113 mg/dL ()  H  11/14/19  09:59    


 


Calcium  9.3 mg/dL (8.4-10.2)   11/15/19  07:06    


 


Phosphorus  8.4 mg/dL (2.5-4.5)  H  11/15/19  07:06    


 


Magnesium  2.7 mg/dL (1.6-2.3)  H  11/15/19  07:06    


 


Total Bilirubin  0.3 mg/dL (0.2-1.3)   11/15/19  07:06    


 


Direct Bilirubin  0.2 mg/dL (0.0-0.4)   11/15/19  07:06    


 


Neonat Total Bilirubin  Not Reportable   11/15/19  07:06    


 


Neonat Direct Bilirubin  Not Reportable   11/15/19  07:06    


 


Neonat Indirect Bili  Not Reportable   11/15/19  07:06    


 


AST  16 U/L (17-59)  L  11/15/19  07:06    


 


ALT  13 U/L (<50)   11/15/19  07:06    


 


Alkaline Phosphatase  66 U/L ()   11/15/19  07:06    


 


Total Protein  6.7 g/dL (6.3-8.2)   11/15/19  07:06    


 


Albumin  3.9 g/dL (3.5-5.0)   11/15/19  07:06    


 


Urine Color  YELLOW   11/14/19  23:31    


 


Urine Appearance  CLEAR   11/14/19  23:31    


 


Urine pH  7.0  (5.0-9.0)   11/14/19  23:31    


 


Ur Specific Gravity  1.018   11/14/19  23:31    


 


Urine Protein  100 mg/dL (NEGATIVE)  H  11/14/19  23:31    


 


Urine Glucose (UA)  50 mg/dL (NEGATIVE)  H  11/14/19  23:31    


 


Urine Ketones  NEGATIVE mg/dL (NEGATIVE)   11/14/19  23:31    


 


Urine Blood  NEGATIVE  (NEGATIVE)   11/14/19  23:31    


 


Urine Nitrite  NEGATIVE  (NEGATIVE)   11/14/19  23:31    


 


Urine Bilirubin  NEGATIVE  (NEGATIVE)   11/14/19  23:31    


 


Urine Urobilinogen  NEGATIVE mg/dL (<2.0)   11/14/19  23:31    


 


Ur Leukocyte Esterase  NEGATIVE  (NEGATIVE)   11/14/19  23:31    


 


Urine WBC (Auto)  1 /HPF  11/14/19  23:31    


 


Squamous Epi Cells Auto  <1 /HPF  11/14/19  23:31    


 


Urine Ascorbic Acid  40  (NEGATIVE)  H  11/14/19  23:31    


 


Stl C. Difficile GDH Ag  NEGATIVE  (NEGATIVE)   11/15/19  01:00    


 


Stl C.difficile Tox A&B  NEGATIVE  (NEGATIVE)   11/15/19  01:00    











Impressions: 


                                        





Chest X-Ray  11/14/19 00:00


IMPRESSION:  NO ACUTE RADIOGRAPHIC FINDING IN THE CHEST.


 








Chest X-Ray  11/14/19 00:00


IMPRESSION:  NO ACUTE RADIOGRAPHIC FINDING IN THE CHEST.

## 2019-11-15 NOTE — PDOC CONSULTATION
Consultation


Consult Date: 11/15/19


Provider Consulted: XIMENA TONY


Consult reason:: I was asked to see the patient to supervise peritoneal dialysis

well patient was admitted for observation.





History of Present Illness


Admission Date/PCP: 


  11/14/19 15:21





  MELISSA WOOTEN MD





History of Present Illness: 


ZAKIA GOODWIN is a 69 year old gentleman known to me with history of ESRD 

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 

hyperlipidemia who was admitted for observation yesterday due to persistent 

vomiting and possible dehydration.  Patient presented yesterday morning for his 

EGD and colonoscopy, however when he came in he was vomiting and was just fe

eling sick.  Patient states that he did his prep for his procedure Wednesday 

night.  He hooked himself on the cycler machine for his peritoneal dialysis at 

around 9 PM on Wednesday.  At around 4 AM he woke up and started vomiting 3 

times.  He was just not feeling good at that time.  He got off about 1400 mL of 

ultrafiltration from his peritoneal dialysis.  However he did still came for his

procedure but he did not look good nor did he feel good at that time.  He 

vomited again 3 times while he was in the endoscopy suite.  At that time he was 

deemed to be not at the good condition to do the procedure so he was admitted 

for observation to have the procedure done today instead.  I was then called to 

manage his peritoneal dialysis while he is here overnight.





Since we do not have the cycler machine I started him on CAPD with 2500 fill 

volume, and using only 1.5% dialysis solution considering possible dehydration. 

He was also started on very cautious IV fluid hydration with normal saline at 75

mL an hour until the procedure.  He has had 2 exchanges prior to the EGD and 

colonoscopy today and has acceptable ultrafiltration.  He underwent his 

procedure and it seems to be uneventful.  When I saw him this afternoon he seems

to be pretty stable.  He does not feel nauseated and vomiting anymore.  He does 

not have any other complaints otherwise.








Past Medical History


Cardiac Medical History: Reports: Hyperlipidemia, Hypertension-primary


Renal/ Medical History: Reports: End Stage Renal Disease, Hyperphosphatemia, 

Secondary Hyperparathyroidism, Other - ADPKD


Malignancy Medical History: Reports: Skin Cancer - Left ear basal cell carcinoma


Hematology Medical History: Reports Anemia of Chronic Kidney Disease





Past Surgical History


Past Surgical History: Reports: Orthopedic Surgery - Back surgery





Social History


Information Source: Patient, [facility] Records


Smoking Status: Former Smoker


Electronic Cigarette use?: No


Last Time Smoked: 10 years ago


Frequency of Alcohol Use: None


Hx Recreational Drug Use: No


Drugs: None


Hx Prescription Drug Abuse: No





- Advance Directive


Resuscitation Status: Full Code





Family History


Family History: CAD - Father and brother, CVA - Mother, DM - Brother and father


Parental Family History Reviewed: Yes


Children Family History Reviewed: Yes


Sibling(s) Family History Reviewed.: Yes





Medication/Allergy


Home Medications: 








Amlodipine Besylate [Norvasc 10 mg Tablet] 10 mg PO DAILY 12/05/17 


Aspirin [Ecotrin 81 mg EC Tablet] 81 mg PO DAILY 12/05/17 


Calcitriol [Rocaltrol 0.25 mcg Capsule] 0.25 mcg PO DAILY 12/05/17 


Carvedilol [Coreg 6.25 mg Tablet] 6.25 mg PO Q12 12/05/17 


L.acidoph,Paracasei, B.lactis [Probiotic] 1 cap PO DAILY 12/05/17 


Omega-3 Fatty Acids/Fish Oil [Omega 3 Fish Oil Softgel] 1 cap PO DAILY 12/05/17 


Ferric Citrate [Auryxia] 210 mg PO TID 11/14/19 


Furosemide [Lasix 40 mg Tablet] 1 tab PO BID 11/14/19 


Lysine 1 tab PO DAILY 11/14/19 


Magnesium Oxide [Mag-Ox 400 mg Tablet] 400 mg PO DAILY 11/14/19 


Simvastatin 20 mg PO DAILY 11/14/19 








Allergies/Adverse Reactions: 


                                        





Penicillins Allergy (Verified 12/07/17 11:20)


   RASH











Review of Systems


All systems: reviewed and no additional remarkable complaints except as stated


Review of Systems: 





Constitutional: ABSENT: chills, fatigue, fever(s), headache(s), weight gain, 

weight loss


Eyes: ABSENT: visual disturbances


Ears: ABSENT: hearing changes


Cardiovascular: ABSENT: chest pain, dyspnea on exertion, edema, orthropnea, 

palpitations


Respiratory: ABSENT: cough, dyspnea, hemoptysis


Gastrointestinal: ABSENT: abdominal pain, constipation, diarrhea, hematemesis, 

hematochezia; admits nausea, and vomiting


Genitourinary: ABSENT: dysuria, hematuria


Musculoskeletal: ABSENT: joint swelling


Integumentary: ABSENT: rash, wounds


Neurological: ABSENT: abnormal gait, abnormal speech, confusion, dizziness, 

focal weakness, numbness, syncope


Psychiatric: ABSENT: anxiety, depression


Endocrine: ABSENT: cold intolerance, heat intolerance, polydipsia, polyuria


Hematologic/Lymphatic: ABSENT: easy bleeding, easy bruising, lymphadenopathy





Physical Exam


Vital Signs: 


                                        











Temp Pulse Resp BP Pulse Ox


 


 98.3 F   81   16   155/59 H  92 


 


 11/15/19 15:14  11/15/19 15:14  11/15/19 15:14  11/15/19 15:14  11/15/19 15:14








                                 Intake & Output











 11/14/19 11/15/19 11/16/19





 06:59 06:59 06:59


 


Intake Total  5350 4092


 


Output Total  2630 2650


 


Balance  2720 1442


 


Weight  125.3 kg 125.3 kg











Exam: 





General appearance: No acute distress, cooperative, well-developed, well-

nourished


Head exam: PRESENT: atraumatic, normocephalic


Eye exam: PRESENT: Conjunctiva Pink, EOMI, PERRLA.  ABSENT: conjunctival 

injection, scleral icterus


Mouth exam: PRESENT: moist, neck supple, tongue midline


Neck exam: PRESENT: full ROM.  ABSENT: carotid bruit, JVD, lymphadenopathy, 

thyromegaly


Respiratory exam: PRESENT: clear to auscultation bilaterally.  ABSENT: rales, 

rhonchi, stridor, wheezes


Cardiovascular exam: PRESENT: RRR, +S1, +S2.  ABSENT: systolic murmur


Pulses: PRESENT: normal radial pulses, normal dorsalis pedis pulses


GI/Abdominal exam: PRESENT: normal bowel sounds, soft.  ABSENT: guarding, mass, 

tenderness


Rectal exam: Deferred


Extremities exam: PRESENT: full ROM.  ABSENT: calf tenderness, pedal edema


Musculoskeletal: PRESENT: full ROM.  ABSENT: deformity


Neurological exam: PRESENT: alert, Awake, Oriented to person, Oriented to place,

Oriented to time, reflexes normal, CN II-XII grossly intact.  ABSENT: motor 

sensory deficit


Psychiatric exam: PRESENT: appropriate affect, normal mood.  ABSENT: homicidal 

ideation, suicidal ideation


Skin exam: PRESENT: intact, dry, warm.  ABSENT: rash





Results


Laboratory Results: 


                                        





                                 11/15/19 07:06 





                                 11/15/19 07:06 





                                        











  11/14/19 11/15/19 11/15/19





  23:31 07:06 07:06


 


WBC   8.8 


 


RBC   3.28 L 


 


Hgb   10.8 L 


 


Hct   32.4 L 


 


MCV   99 H 


 


MCH   32.9 


 


MCHC   33.4 


 


RDW   15.7 H 


 


Plt Count   329 


 


Seg Neutrophils %   83.0 H 


 


Sodium    147.2 H


 


Potassium    5.2 H


 


Chloride    107


 


Carbon Dioxide    22


 


Anion Gap    18


 


BUN    44 H


 


Creatinine    10.21 H


 


Est GFR (African Amer)    6 L


 


Glucose    109


 


Calcium    9.3


 


Phosphorus    8.4 H


 


Magnesium    2.7 H


 


Total Bilirubin    0.3


 


AST    16 L


 


Alkaline Phosphatase    66


 


Total Protein    6.7


 


Albumin    3.9


 


Urine Color  YELLOW  


 


Urine Appearance  CLEAR  


 


Urine pH  7.0  


 


Ur Specific Gravity  1.018  


 


Urine Protein  100 H  


 


Urine Glucose (UA)  50 H  


 


Urine Ketones  NEGATIVE  


 


Urine Blood  NEGATIVE  


 


Urine Nitrite  NEGATIVE  


 


Ur Leukocyte Esterase  NEGATIVE  


 


Urine WBC (Auto)  1  











Impressions: 


                                        





Chest X-Ray  11/14/19 00:00


IMPRESSION:  NO ACUTE RADIOGRAPHIC FINDING IN THE CHEST.


 














Assessment & Plan





- Diagnosis


(1) ESRD on peritoneal dialysis


Is this a current diagnosis for this admission?: Yes   


Plan: 


Patient had CAPD while in the hospital.  We used 2500 fill volume using 1.5% 

dialysis solution.  Upon discharge today patient will be drained here in the 

hospital and will go home without any fluid in.  Instructed patient to just use 

1.5% solution when he looks himself and that the cycler machine tonight and 

continue with his usual regimen tomorrow once he is feeling much better.








(2) Hypertension


Qualifiers: 


   Hypertension type: essential hypertension   Qualified Code(s): I10 - 

Essential (primary) hypertension   


Is this a current diagnosis for this admission?: Yes   





(3) Hyperkalemia


Is this a current diagnosis for this admission?: Yes   


Plan: 


Mild and borderline which could be taken cared of by his peritoneal dialysis.








(4) Hyperphosphatemia


Is this a current diagnosis for this admission?: Yes   





- Notes


Notes: 





Thank you very much for this consultation.





- Time


Time Spent: 50 to 70 Minutes

## 2019-11-16 NOTE — EKG REPORT
SEVERITY:- ABNORMAL ECG -

SINUS RHYTHM

RBBB AND LAFB

PROBABLE LEFT VENTRICULAR HYPERTROPHY

:

Confirmed by: Deisy Cordova MD 16-Nov-2019 11:48:14

## 2020-11-08 NOTE — PDOC H&P
History of Present Illness


Admission Date/PCP: 


  





  MELISSA WOOTEN MD





Patient complains of: Abdominal pain for 1 day


History of Present Illness: 


ZAKIA GOODWIN is a 70 year old male, with history of end-stage renal 

disease on peritoneal dialysis (last session last night), Hx 

Hypercholesterolemia, Hx Hypertension, history of back surgery who has a 1 day 

complaining of mid abdominal pain around the umbilicus.  The patient has a known

umbilical hernia that he has been following conservatively.  A CT scan abdomen 

pelvis has been done upon arrival in the emergency room today and this is 

significant for a strangulated umbilical hernia with infarcted small bowel and 

gas in the portal vein system.








Past Medical History


Cardiac Medical History: Reports: Hyperlipidema, Hypertension


   Denies: Coronary Artery Disease, Myocardial Infarction


Pulmonary Medical History: 


   Denies: Asthma, Bronchitis, Chronic Obstructive Pulmonary Disease (COPD) - 

SOB, Pneumonia


Neurological Medical History: 


   Denies: Seizures


Renal/ Medical History: Reports: End Stage Renal Disease


Malignancy Medical History: Reports: Skin Cancer - Left ear basal cell carcinoma


GI Medical History: 


   Denies: Hepatitis, Hiatal Hernia


Musculoskeltal Medical History: 


   Denies: Arthritis


Hematology: Reports: Anemia - HGB 9.3


   Denies: Sickle Cell Disease





Past Surgical History


Past Surgical History: Reports: Orthopedic Surgery - Back surgery


   Denies: Pacemaker





Social History


Smoking Status: Former Smoker


Electronic Cigarette use?: No


Frequency of Alcohol Use: None


Hx Recreational Drug Use: No


Drugs: None


Hx Prescription Drug Abuse: No





Family History


Family History: Reviewed & Not Pertinent


Parental Family History Reviewed: No


Children Family History Reviewed: No


Sibling(s) Family History Reviewed.: No





Medication/Allergy


Home Medications: 








Amlodipine Besylate [Norvasc 10 mg Tablet] 10 mg PO DAILY 12/05/17 


Aspirin [Ecotrin 81 mg EC Tablet] 81 mg PO DAILY 12/05/17 


Calcitriol [Rocaltrol 0.25 mcg Capsule] 0.25 mcg PO DAILY 12/05/17 


Carvedilol [Coreg 6.25 mg Tablet] 6.25 mg PO Q12 12/05/17 


L.acidoph,Paracasei, B.lactis [Probiotic] 1 cap PO DAILY 12/05/17 


Omega-3 Fatty Acids/Fish Oil [Omega 3 Fish Oil Softgel] 1 cap PO DAILY 12/05/17 


Ferric Citrate [Auryxia] 210 mg PO TID 11/14/19 


Furosemide [Lasix 40 mg Tablet] 1 tab PO BID 11/14/19 


Lysine 1 tab PO DAILY 11/14/19 


Magnesium Oxide [Mag-Ox 400 mg Tablet] 400 mg PO DAILY 11/14/19 


Simvastatin 20 mg PO DAILY 11/14/19 








Allergies/Adverse Reactions: 


                                        





Penicillins Allergy (Verified 11/08/20 09:06)


   RASH











Physical Exam


Vital Signs: 


                                        











Temp Pulse Resp BP Pulse Ox


 


 97.5 F   110 H  25 H  98/64 L  93 


 


 11/08/20 08:49  11/08/20 12:24  11/08/20 12:24  11/08/20 12:24  11/08/20 12:24








                                 Intake & Output











 11/07/20 11/08/20 11/09/20





 06:59 06:59 06:59


 


Intake Total   500


 


Balance   500


 


Weight   174.633 kg











General appearance: PRESENT: mild distress, obese, well-developed, well-

nourished


Head exam: PRESENT: atraumatic, normocephalic


Eye exam: PRESENT: EOMI


Mouth exam: PRESENT: dry mucosa, neck supple


Neck exam: PRESENT: full ROM


Respiratory exam: PRESENT: clear to auscultation tasha


Cardiovascular exam: PRESENT: RRR


GI/Abdominal exam: PRESENT: distended, hypoactive bowel sounds, rebound - 

Diffuse, tenderness - Diffuse tenderness, other - Umbilical mass tender 

measuring approximately 3 inches in diameter appreciated on deep palpation


Rectal exam: PRESENT: deferred


Extremities exam: PRESENT: full ROM


Musculoskeletal exam: PRESENT: full ROM


Neurological exam: PRESENT: alert, awake, oriented to situation, CN II-XII 

grossly intact


Psychiatric exam: PRESENT: appropriate affect


Skin exam: PRESENT: warm





Results


Laboratory Results: 


                                        





                                 11/08/20 08:55 





                                 11/08/20 08:55 





                                        











  11/08/20 11/08/20 11/08/20





  08:55 08:55 08:55


 


WBC  4.2  


 


RBC  3.27 L  


 


Hgb  11.0 L  


 


Hct  31.7 L  


 


MCV  97  


 


MCH  33.7 H  


 


MCHC  34.8  


 


RDW  15.1 H  


 


Plt Count  468 H  


 


Seg Neutrophils %  70.8  


 


Carbonic Acid   


 


HCO3/H2CO3 Ratio   


 


ABG pH   


 


ABG pCO2   


 


ABG pO2   


 


ABG HCO3   


 


ABG O2 Saturation   


 


ABG Base Excess   


 


FiO2   


 


Sodium   143.3 


 


Potassium   4.9 


 


Chloride   66 L 


 


Carbon Dioxide   32 H 


 


Anion Gap   45 H 


 


BUN   98 H 


 


Creatinine   17.65 H 


 


Est GFR (African Amer)   3 L 


 


Glucose   199 H 


 


Lactic Acid    8.3 H


 


Calcium   9.9 


 


Total Bilirubin   0.5 


 


AST   76 H 


 


Alkaline Phosphatase   73 


 


Total Protein   8.5 H 


 


Albumin   5.2 H 


 


Lipase   85.8 














  11/08/20





  11:00


 


WBC 


 


RBC 


 


Hgb 


 


Hct 


 


MCV 


 


MCH 


 


MCHC 


 


RDW 


 


Plt Count 


 


Seg Neutrophils % 


 


Carbonic Acid  1.02 L


 


HCO3/H2CO3 Ratio  27:1


 


ABG pH  7.54 H


 


ABG pCO2  33.8 L


 


ABG pO2  58.1 L


 


ABG HCO3  28.4 H


 


ABG O2 Saturation  93.3 L


 


ABG Base Excess  5.9


 


FiO2  ROOM AIR


 


Sodium 


 


Potassium 


 


Chloride 


 


Carbon Dioxide 


 


Anion Gap 


 


BUN 


 


Creatinine 


 


Est GFR (African Amer) 


 


Glucose 


 


Lactic Acid 


 


Calcium 


 


Total Bilirubin 


 


AST 


 


Alkaline Phosphatase 


 


Total Protein 


 


Albumin 


 


Lipase 











Impressions: 


                                        





Abdomen/Pelvis CT  11/08/20 10:23


IMPRESSION:  Small bowel infarction secondary to incarcerated hernia at the 

umbilicus and proximal dilatation of small bowel loops.


Gas in the SMV and portal venous system in the liver.


 














Assessment & Plan





- Diagnosis


(1) Strangulated umbilical hernia


Is this a current diagnosis for this admission?: Yes   





(2) ESRD on peritoneal dialysis


Is this a current diagnosis for this admission?: Yes   





(3) Small bowel obstruction with strangulation or infarction


Is this a current diagnosis for this admission?: Yes   





(4) Hypertension


Qualifiers: 


 


Is this a current diagnosis for this admission?: Yes   





- Time


Anticipated Discharge Disposition: Home with Home Health


Anticipated Discharge Timeframe: When medically stable





- Plan Summary


Plan Summary: 





Assessment:





History of mid abdominal pain at the umbilicus for about 1 day


CT scan abdomen pelvis done today which demonstrates a strangulated umbilical 

hernia with small bowel infarct gas in the portal vein


Normal white blood cell count with an H&H of 11/37, respectively


End-stage renal disease on peritoneal dialysis daily, last session yesterday


BUN/creatinine 98 and 17, respectively


Lactic acid 8.3 with venous blood pH 7.5





Plan:





Plan urgent laparotomy, possible bowel resection, possible ostomy, repair of 

umbilical hernia with mesh; procedure, risks, benefits, complications, including

infection, bleeding, bowel injury, anastomosis leak, removal of peritoneal 

dialysis catheter, and death have been discussed with the patient, he 

understands all the above, his questions were answered to his satisfaction, and 

he desires to proceed.


N.p.o.


EKG


Cipro/Flagyl IV preoperatively


Rapid COVID-19 test to determine whether the patient was to be transferred to 

regular floor or to the Covid unit

## 2020-11-08 NOTE — RADIOLOGY REPORT (SQ)
EXAM DESCRIPTION: 



XR CHEST 1 VIEW



COMPLETED DATE/TME:  11/08/2020 19:34



CLINICAL HISTORY: 



70 years, Male, Check Placement of NG Tube/Centeral line

placement



COMPARISON:

Prior study from 11/14/2019



NUMBER OF VIEWS:

One



TECHNIQUE:

Single frontal view of the chest was obtained portably



LIMITATIONS:

None.



FINDINGS:



Enteric drainage tube tip curls within the gastric fundus. Right

IJ approach central venous catheter tip is located within the

SVC. Cardiac and mediastinal contours are stable. Suspect patchy

retrocardiac left basilar opacity. No pleural effusion or

pneumothorax.



IMPRESSION:



Right IJ approach central venous catheter tip is located within

the SVC.



Enteric drainage tube tip curls within the gastric fundus.



Suspect patchy retrocardiac left basilar opacity. Consider

atelectasis or pneumonia to include aspiration.

 



copyright 2011 [employer]- All Rights Reserved

## 2020-11-08 NOTE — RADIOLOGY REPORT (SQ)
EXAM DESCRIPTION:  CT ABD/PELVIS ORAL ONLY



IMAGES COMPLETED DATE/TIME:  11/8/2020 1:16 pm



REASON FOR STUDY:  abdominal pain



COMPARISON:  None.



TECHNIQUE:  CT scan of the abdomen and pelvis performed without intravenous or oral contrast. Images 
reviewed with lung, soft tissue, and bone windows. Reconstructed coronal and sagittal MPR images revi
ewed. All images stored on PACS.

All CT scanners at this facility use dose modulation, iterative reconstruction, and/or weight based d
osing when appropriate to reduce radiation dose to as low as reasonably achievable (ALARA).

[facility]: Dose Right  [facility]: CareDose    [facility]: Dose Right    [facility]: Teradose 4D    [facility]: Smart ObjectVideo



RADIATION DOSE:  CT Rad equipment meets quality standard of care and radiation dose reduction techniq
ues were employed. CTDIvol: 16.9 mGy. DLP: 1023 mGy-cm.mGy.



LIMITATIONS:  None.



FINDINGS:  LOWER CHEST: No significant findings. No nodules or infiltrates.

NON-CONTRASTED LIVER, SPLEEN, ADRENALS: Extensive gas in the portal venous system.  No masses.  Adren
al glands unremarkable.

PANCREAS: No masses. No peripancreatic inflammatory changes.

GALLBLADDER: No identified stones by CT criteria. No inflammatory changes to suggest cholecystitis.

RIGHT KIDNEY AND URETER: Polycystic renal disease   No significant calcifications.   No hydronephrosi
s or hydroureter.

LEFT KIDNEY AND URETER: Polycystic renal disease   No significant calcifications.   No hydronephrosis
 or hydroureter.

AORTA AND RETROPERITONEUM: No aneurysm. No retroperitoneal masses or adenopathy.

BOWEL AND PERITONEAL CAVITY: Dilated small bowel loops secondary to a strangulated hernia at the umbi
licus.  Gas in the small bowel wall proximally.  Small bowel infarction.

APPENDIX: Not visualized.

PELVIS, BLADDER, AND ABDOMINAL WALL:No abnormal masses. No free fluid. Bladder normal.

BONES: No significant findings.

OTHER: Peritoneal dialysis catheter.



IMPRESSION:  Small bowel infarction secondary to incarcerated hernia at the umbilicus and proximal di
latation of small bowel loops.

Gas in the SMV and portal venous system in the liver.



COMMENT:   Pertinent findings on the imaging study reported as a CRITICAL RESULT to YUNI CONCEPCION MD at13:43 on 11/8/2020.

Category of Critical Result: Small bowel infarction with portal venous gas

Quality ID # 436: Final reports with documentation of one or more dose reduction techniques (e.g., Au
tomated exposure control, adjustment of the mA and/or kV according to patient size, use of iterative 
reconstruction technique)



TECHNICAL DOCUMENTATION:  JOB ID:  [id number]

 2011 [employer]- All Rights Reserved



Reading location - IP/workstation name: [email or web address]

## 2020-11-08 NOTE — OPERATIVE REPORT
Operative Report


DATE OF SURGERY: 11/08/20


PREOPERATIVE DIAGNOSIS: Strangulated umbilical hernia


POSTOPERATIVE DIAGNOSIS: Same


OPERATION: Umbilical herniorrhaphy with absorbable mesh


SURGEON: RADHA HILL


ANESTHESIA: GA - 30 mL's of 0.25% Marcaine without epinephrine


TISSUE REMOVED OR ALTERED: Plical hernia sac, umbilical skin, umbilical scar


COMPLICATIONS: 





None


ESTIMATED BLOOD LOSS: Negligible less than 5 mL


INTRAOPERATIVE FINDINGS: Small bowel loop incarcerated and strangulated inside 

the umbilical hernia sac


PROCEDURE: 





The procedure was done in the operating room, the patient was placed in a supine

position, the abdomen prepped and draped in usual fashion.  After standard timeo

ut, a small incision was made just below the umbilicus, the upper skin flap of 

the incision was then dissected with Bovie and sharply from the anterior rectus 

sheath until the umbilicus in the umbilical hernia sac were identified.  The 

dissection was continued laterally on either side of the umbilical sac until 

this was circumferentially dissected.  The skin of the umbilicus was sharply 

dissected off the hernia sac which was completely identified, circumferential 

dissection of the neck of the umbilical hernia sac was accomplished with Bovie. 

The hernia sac was then opened sharply with scissors and the contents were 

inspected.  A single loop of small bowel was identified inside the hernia sac.  

The ring of the hernia defect was sharply opened with scissors so to allow the 

content to be further mobilized.  The bowel loop was inspected and found to be 

ischemic.  The ischemic loop of bowel underwent warming with laparotomy pads 

soaked in warm normal saline until the blood flow was restored as proven with 

the use of handheld Doppler probe.  After this was accomplished, the loop of 

bowel was replaced within the peritoneal cavity.   A large piece of biologic 

absorbable mesh was then sutured in an inlay technique using interrupted 0-

Prolene horizontal mattress sutures which were tied against the parietal 

peritoneum and the posterior rectus sheath.  The edges of the absorbable mesh 

were overlapping the defect for about 1 inches circumferentially.  The edges of 

the hernia defect where sutured together with interrupted figure-of-eight 0 

Prolene sutures.  The area was irrigated with normal saline until clear.  A 

separate wound was made with a #15 blade and a 10 Faroese Meilio drain was 

threaded subcutaneously and laid on the surgical field and secured to the skin 

with a 3-0 nylon suture.  This was connected to bulb suction.  The deep 

subcutaneous tissue was closed with running 2-0 PDS/2-0 Vicryl suture, followed 

by the approximation of the superficial subcutaneous tissue with 3-0 Vicryl 

running suture, followed by skin closure with 4-0 running PDS subcuticular 

suture.  Dermabond and sterile dressings were applied.  The patient tolerated 

procedure well, the count of instruments and sponges was correct x3, he was then

extubated and transferred to recovery room in satisfactory conditions.

## 2020-11-08 NOTE — ER DOCUMENT REPORT
ED General





- General


Chief Complaint: Other


Stated Complaint: DEHYDRATION


Time Seen by Provider: 11/08/20 10:05


Primary Care Provider: 


MELISSA WOOTEN MD [Primary Care Provider] - Follow up as needed


Notes: 





70-year-old man presenting to the emergency department with a history of home 

peritoneal dialysis.  Presents now with complaint of weakness, feeling 

dehydrated and having some periumbilical and supra pubic abdominal pain.  

Patient notes he has had some nausea and vomiting denies diarrhea and has felt 

very fatigued.  He perform peritoneal dialysis last night and denies a change in

the fluid.  He still makes urine, however, has not been able to supply a urine 

specimen.  He denies fever and chills, denies diarrhea.


TRAVEL OUTSIDE OF THE [place] IN LAST 30 DAYS: No





- Related Data


Allergies/Adverse Reactions: 


                                        





Penicillins Allergy (Verified 11/08/20 09:06)


   RASH











Past Medical History





- Social History


Smoking Status: Former Smoker


Chew tobacco use (# tins/day): No


Frequency of alcohol use: None


Drug Abuse: None


Family History: Reviewed & Not Pertinent


Patient has homicidal ideation: No





- Past Medical History


Cardiac Medical History: Reports: Hx Hypercholesterolemia, Hx Hypertension


   Denies: Hx Coronary Artery Disease, Hx Heart Attack


Pulmonary Medical History: 


   Denies: Hx Asthma, Hx Bronchitis, Hx COPD - SOB, Hx Pneumonia


Neurological Medical History: Denies: Hx Cerebrovascular Accident, Hx Seizures


Renal/ Medical History: Reports: Hx End Stage Renal Disease.  Denies: Hx 

Peritoneal Dialysis


Malignancy Medical History: Reports Hx Skin Cancer - Left ear basal cell 

carcinoma


GI Medical History: Denies: Hx Hepatitis, Hx Hiatal Hernia, Hx Ulcer


Musculoskeletal Medical History: Denies Hx Arthritis


Infectious Medical History: Denies: Hx Hepatitis


Past Surgical History: Reports: Hx Orthopedic Surgery - Back surgery.  Denies: 

Hx Open Heart Surgery, Hx Pacemaker





- Immunizations


Hx Diphtheria, Pertussis, Tetanus Vaccination: No





Review of Systems





- Review of Systems


Notes: 





Constitutional: See HPI


HENT: Negative for sore throat.


Eyes: Negative for visual changes.


Cardiovascular: Negative for chest pain.


Respiratory: Negative for shortness of breath.


Gastrointestinal: See HPI


Genitourinary: Negative for dysuria.


Musculoskeletal: Negative for back pain.


Skin: Negative for rash.


Neurological: Negative for headaches, weakness or numbness.





10 point ROS negative except as marked above and in HPI.





Physical Exam





- Vital signs


Vitals: 


                                        











Temp


 


 97.5 F 


 


 11/08/20 08:49














- Notes


Notes: 





GENERAL:  No acute distress, non-toxic appearance.  


HEAD:  Normal with no signs of head trauma.


EYES:  PERRLA, EOMI, conjunctiva normal, no discharge.  


EARS:  Hearing grossly intact.


NOSE: Normal.


THROAT:  Oropharynx is normal. 


NECK:  Normal range of motion, no tenderness, supple, no lymphadenopathy, No 

adenopathy, no JVD.   


CHEST:  Clear breath sounds bilaterally.  No wheezes, rales, or rhonchi.  


CARDIAC:  Regular rate and rhythm.  S1 and S2, without murmurs, gallops, or 

rubs.


VASCULAR:  No Edema.  Peripheral pulses normal and equal in all extremities.


ABDOMEN: Normal and soft with supraumbilical hernia, + tenderness, + reducible, 

suprapubic tenderness, no guarding, no rebound, no masses or pulsatile masses.


GENITOURINARY: + Suprapubic tenderness


LYMPATHTIC:  No lymphadenopathy noted.


MUSCULOSKELETAL:  Good range of motion of all major joints. Extremities without 

clubbing, cyanosis or edema.  


NEUROLOGICAL:  Alert and oriented x 3.  No focal sensory or strength deficits.  

Speech normal.  Follows commands appropriately.


SKIN:  Normal appearance with no rashes or lesions.





Course





- Re-evaluation


Re-evalutation: 





11/08/20 13:59


Review of the labs and imaging reveals that patient has a small bowel infarction

secondary to incarcerated umbilical hernia.  He has a lactic 8.9.  I have 

contacted the general surgeon Dr. Lilly, he will see the patient in the 

emergency department and will be taking the patient to the OR this afternoon.  I

have also informed the patient of the findings and he is aware that he will need

to go to the operating suite.





- Vital Signs


Vital signs: 


                                        











Temp Pulse Resp BP Pulse Ox


 


 97.5 F   110 H  25 H  98/64 L  93 


 


 11/08/20 08:49  11/08/20 12:24  11/08/20 12:24  11/08/20 12:24  11/08/20 12:24














- Laboratory


Result Diagrams: 


                                 11/08/20 08:55





                                 11/08/20 08:55


Laboratory results interpreted by me: 


                                        











  11/08/20 11/08/20 11/08/20





  08:55 08:55 08:55


 


RBC  3.27 L  


 


Hgb  11.0 L  


 


Hct  31.7 L  


 


MCH  33.7 H  


 


RDW  15.1 H  


 


Plt Count  468 H  


 


Mono % (Auto)  14.6 H  


 


Carbonic Acid   


 


ABG pH   


 


ABG pCO2   


 


ABG pO2   


 


ABG HCO3   


 


ABG Total CO2   


 


ABG O2 Saturation   


 


Chloride   66 L 


 


Carbon Dioxide   32 H 


 


Anion Gap   45 H 


 


BUN   98 H 


 


Creatinine   17.65 H 


 


Est GFR ( Amer)   3 L 


 


Est GFR (MDRD) Non-Af   3 L 


 


Glucose   199 H 


 


Lactic Acid    8.3 H


 


AST   76 H 


 


Total Protein   8.5 H 


 


Albumin   5.2 H 














  11/08/20





  11:00


 


RBC 


 


Hgb 


 


Hct 


 


MCH 


 


RDW 


 


Plt Count 


 


Mono % (Auto) 


 


Carbonic Acid  1.02 L


 


ABG pH  7.54 H


 


ABG pCO2  33.8 L


 


ABG pO2  58.1 L


 


ABG HCO3  28.4 H


 


ABG Total CO2  29.4 H


 


ABG O2 Saturation  93.3 L


 


Chloride 


 


Carbon Dioxide 


 


Anion Gap 


 


BUN 


 


Creatinine 


 


Est GFR ( Amer) 


 


Est GFR (MDRD) Non-Af 


 


Glucose 


 


Lactic Acid 


 


AST 


 


Total Protein 


 


Albumin 








I have reviewed laboratory data and used this information for the treatment 

decisions regarding the patient.


11/08/20 13:57








- Diagnostic Test


Radiology reviewed: Image reviewed, Reports reviewed


Radiology results interpreted by me: 





11/08/20 13:57





                                        





Abdomen/Pelvis CT  11/08/20 10:23


IMPRESSION:  Small bowel infarction secondary to incarcerated hernia at the 

umbilicus and proximal dilatation of small bowel loops.


Gas in the SMV and portal venous system in the liver.


 














Discharge





- Discharge


Clinical Impression: 


 Incarcerated umbilical hernia, Small bowel obstruction with strangulation or 

infarction, Severe obesity, ESRD on peritoneal dialysis





Condition: Good


Disposition: ADMITTED AS INPATIENT


Admitting Provider: Surgicalist - Dr. Lilly


Unit Admitted: Surgical Floor


Referrals: 


MELISSA WOOTEN MD [Primary Care Provider] - Follow up as needed

## 2020-11-08 NOTE — EKG REPORT
SEVERITY:- ABNORMAL ECG -

SINUS RHYTHM

RBBB AND LAFB

LEFT VENTRICULAR HYPERTROPHY

:

Confirmed by: Femi Blanco MD 08-Nov-2020 18:08:56

## 2020-11-08 NOTE — PDOC CONSULTATION
Consultation


Consult Date: 11/08/20


Attending physician:: RADHA HILL


Provider Consulted: DORIAN LARRY


Consult reason:: ESRD.  Hypertension





History of Present Illness


Admission Date/PCP: 


  11/08/20 14:09





  MELISSA WOOTEN MD





Patient complains of: Abdominal pain


History of Present Illness: 


ZAKIA GOODWIN is a 70 year old male with a history of end-stage renal 

disease on peritoneal dialysis (last session was last night), hypertension and 

hyperlipidemia is now admitted to surgical service for strangulated umbilical 

hernia after he presented with a 1 day duration of abdominal pain around his 

umbilical area.  CT of his abdomen showed small bowel infarction secondary to 

incarcerated umbilical hernia with dilation of proximal small bowel loops and 

gas in the portal venous system.  Currently he is in immediate postop state 

after undergoing umbilical herniorrhaphy.  Currently apart from mild pain at the

surgical wound site patient has no new complaints.  He denies shortness of 

breath, chest pain, palpitation, dizziness, nausea or vomiting.





Past Medical History


Cardiac Medical History: Reports: Hyperlipidema, Hypertension


   Denies: Coronary Artery Disease, Myocardial Infarction


Pulmonary Medical History: 


   Denies: Asthma, Bronchitis, Chronic Obstructive Pulmonary Disease (COPD) - 

SOB, Pneumonia


Neurological Medical History: 


   Denies: Seizures


Renal/ Medical History: Reports: End Stage Renal Disease


Malignancy Medical History: Reports: Skin Cancer - Left ear basal cell carcinoma


GI Medical History: 


   Denies: Hepatitis, Hiatal Hernia


Musculoskeltal Medical History: 


   Denies: Arthritis


Hematology: Reports: Anemia - HGB 9.3


   Denies: Sickle Cell Disease





Past Surgical History


Past Surgical History: Reports: Orthopedic Surgery - Back surgery


   Denies: Pacemaker





Social History


Information Source: Patient


Smoking Status: Former Smoker


Electronic Cigarette use?: No


Frequency of Alcohol Use: None


Hx Recreational Drug Use: No


Drugs: None


Hx Prescription Drug Abuse: No





- Advance Directive


Resuscitation Status: Full Code





Family History


Family History: Reviewed & Not Pertinent


Parental Family History Reviewed: Yes


Children Family History Reviewed: Yes


Sibling(s) Family History Reviewed.: Yes





Medication/Allergy


Home Medications: 








Aspirin [Ecotrin 81 mg EC Tablet] 81 mg PO DAILY 12/05/17 


L.acidoph,Paracasei, B.lactis [Probiotic] 1 cap PO DAILY 12/05/17 


Omega-3 Fatty Acids/Fish Oil [Omega 3 Fish Oil Softgel] 1 cap PO DAILY 12/05/17 


Furosemide [Lasix 40 mg Tablet] 40 mg PO BID 11/14/19 


Lysine 1 tab PO DAILY 11/14/19 


Magnesium Oxide [Mag-Ox 400 mg Tablet] 400 mg PO DAILY 11/14/19 


Simvastatin 20 mg PO DAILY 11/14/19 


Amlodipine Besylate [Norvasc 5 mg Tablet] 5 mg PO DAILY 11/09/20 


Carvedilol [Coreg 3.125 mg Tablet] 3.125 mg PO Q12 11/09/20 


Fluticasone Propionate [Flonase Nasal Spray 50 Mcg/Spray 16 gm] 1 spray NAREB 

DAILY 11/09/20 


Hydroxyzine HCl [Atarax 10 mg Tablet] 25 mg PO TIDP PRN 11/09/20 








Allergies/Adverse Reactions: 


                                        





Penicillins Allergy (Verified 11/08/20 09:06)


   RASH











Review of Systems


Constitutional: PRESENT: as per HPI


Eyes: ABSENT: visual disturbances


Ears: ABSENT: hearing changes


Nose, Mouth, and Throat: ABSENT: as per HPI, headache(s), mouth pain, sore 

throat, vertigo, other


Cardiovascular: ABSENT: chest pain, dyspnea on exertion, edema, orthropnea, 

palpitations


Respiratory: ABSENT: cough, hemoptysis


Gastrointestinal: PRESENT: as per HPI


Genitourinary: ABSENT: dysuria, hematuria


Musculoskeletal: ABSENT: joint swelling


Integumentary: ABSENT: rash, wounds


Neurological: ABSENT: abnormal gait, abnormal speech, confusion, dizziness, 

focal weakness, syncope


Psychiatric: ABSENT: anxiety, depression, homidical ideation, suicidal ideation


Endocrine: ABSENT: cold intolerance, heat intolerance, polydipsia, polyuria


Hematologic/Lymphatic: ABSENT: easy bleeding, easy bruising





Physical Exam


Vital Signs: 


                                        











Temp Pulse Resp BP Pulse Ox


 


 98.2 F   94   25 H  129/53 H  94 


 


 11/08/20 17:57  11/08/20 18:12  11/08/20 18:12  11/08/20 18:12  11/08/20 18:12








                                 Intake & Output











 11/07/20 11/08/20 11/09/20





 06:59 06:59 06:59


 


Intake Total   600


 


Output Total   100


 


Balance   500


 


Weight   174.633 kg











Additional comments: 





GENERAL APPEARANCE: Alert and oriented x3, no acute distress


HEENT: Normocephalic and atraumatic. No scleral icterus.  Dry oral mucosa


NECK: Supple. No lymphadenopathy or tenderness. No carotid bruit. No JVD


CHEST: Symmetric. Nontender to palpation.


LUNGS: Has faint scattered wheezes bilaterally


HEART: Regular rate and rhythm with normal S1 and S2. No murmurs, gallops, or 

rubs.


ABDOMEN: There is dressing over the surgical wound site


                 Hypoactive bowel sound, mild tenderness around the wound


EXTREMITIES: No cyanosis, clubbing, or edema.


MUSCULOSKELETAL: No deformity, atrophy or swelling noted


PSYCHIATRIC:  Recent and remote memory is intact. Appropriate mood and affect.


SKIN: Warm, dry, and well perfused. No lesions or rashes are noted. 


NEUROLOGIC: No focal sensory or motor deficits are noted. 








Results


Laboratory Results: 


                                        





                                 11/08/20 08:55 





                                 11/08/20 08:55 





                                        











  11/08/20 11/08/20 11/08/20





  08:55 08:55 08:55


 


WBC  4.2  


 


RBC  3.27 L  


 


Hgb  11.0 L  


 


Hct  31.7 L  


 


MCV  97  


 


MCH  33.7 H  


 


MCHC  34.8  


 


RDW  15.1 H  


 


Plt Count  468 H  


 


Seg Neutrophils %  70.8  


 


Carbonic Acid   


 


HCO3/H2CO3 Ratio   


 


ABG pH   


 


ABG pCO2   


 


ABG pO2   


 


ABG HCO3   


 


ABG O2 Saturation   


 


ABG Base Excess   


 


FiO2   


 


Sodium   143.3 


 


Potassium   4.9 


 


Chloride   66 L 


 


Carbon Dioxide   32 H 


 


Anion Gap   45 H 


 


BUN   98 H 


 


Creatinine   17.65 H 


 


Est GFR (African Amer)   3 L 


 


Glucose   199 H 


 


Lactic Acid    8.3 H


 


Calcium   9.9 


 


Total Bilirubin   0.5 


 


AST   76 H 


 


Alkaline Phosphatase   73 


 


Total Protein   8.5 H 


 


Albumin   5.2 H 


 


Lipase   85.8 














  11/08/20





  11:00


 


WBC 


 


RBC 


 


Hgb 


 


Hct 


 


MCV 


 


MCH 


 


MCHC 


 


RDW 


 


Plt Count 


 


Seg Neutrophils % 


 


Carbonic Acid  1.02 L


 


HCO3/H2CO3 Ratio  27:1


 


ABG pH  7.54 H


 


ABG pCO2  33.8 L


 


ABG pO2  58.1 L


 


ABG HCO3  28.4 H


 


ABG O2 Saturation  93.3 L


 


ABG Base Excess  5.9


 


FiO2  ROOM AIR


 


Sodium 


 


Potassium 


 


Chloride 


 


Carbon Dioxide 


 


Anion Gap 


 


BUN 


 


Creatinine 


 


Est GFR (African Amer) 


 


Glucose 


 


Lactic Acid 


 


Calcium 


 


Total Bilirubin 


 


AST 


 


Alkaline Phosphatase 


 


Total Protein 


 


Albumin 


 


Lipase 











Impressions: 


                                        





Abdomen/Pelvis CT  11/08/20 10:23


IMPRESSION:  Small bowel infarction secondary to incarcerated hernia at the 

umbilicus and proximal dilatation of small bowel loops.


Gas in the SMV and portal venous system in the liver.


 














Assessment and Plan





- Diagnosis


(1) Strangulated umbilical hernia


Is this a current diagnosis for this admission?: Yes   


Plan: 


Immediately postop period  Status post surgery


Further care and management to be formulated by surgical team








(2) End stage renal disease


Is this a current diagnosis for this admission?: Yes   


Plan: 


Currently alert and oriented x3 and has no signs of uremia


Serum potassium level within the normal limit


Patient has anion gap metabolic acidosis with metabolic alkalosis


Has no signs of volume overload


Currently has no emergent indication for dialysis


Per surgery, not peritoneal dialysis for the next 6 weeks


Permacath to be inserted by surgical team in the morning


Nephrology has been consulted by surgical team


Patient to get hemodialysis after obtaining access








(3) Hypertension


Qualifiers: 


 


Is this a current diagnosis for this admission?: Yes   


Plan: 


Currently patient has soft blood pressures


Likely from anesthetic and sedatives he received during surgery


Hold antihypertensive medications for now


Closely monitor vital signs








(4) Elevated lactic acid level


Is this a current diagnosis for this admission?: Yes   


Plan: 


Likely due to ischemic bowel from strangulated hernia


Per surgery note, strangulated bowel part was viable which was confirmed by 

Doppler during surgery


Continue gentle IV hydration


Repeat lactic acid level








- Time


Time Spent with patient: 35 or more minutes


Total Critical Time (Minutes): 40


Medications reviewed and adjusted accordingly: Yes


Anticipated Discharge Disposition: Home, Self Care


Anticipated Discharge Timeframe: within 72 hours





- Inpatient Certification


Based on my medical assessment, after consideration of the patient's 

comorbidities, presenting symptoms, or acuity I expect that the services needed 

warrant INPATIENT care.: Yes


I certify that my determination is in accordance with my understanding of 

Medicare's requirements for reasonable and necessary INPATIENT services [42 CFR 

412.3e].: Yes


Medical Necessity: Need Close Monitoring Due to Risk of Patient Decompensation, 

Need for IV Antibiotics, Need for Surgery, Risk of Complication if Not Cared For

in Hospital


Post Hospital Care: D/C or Transfer Summary

## 2020-11-09 NOTE — RADIOLOGY REPORT (SQ)
EXAM DESCRIPTION:  FLUORO/CV PLACEMENT



IMAGES COMPLETED DATE/TIME:  11/9/2020 11:33 am



REASON FOR STUDY:  PERMCATH PLCMT LEFT SIDE ASSISTED WITH FLUORO IN OR



COMPARISON:  None.



FLUOROSCOPY TIME:  6.4 minutes

4 images saved to PACS.



TECHNIQUE:  Intra-operative images acquired during surgical procedure to evaluate progress.

NUMBER OF IMAGES: 4.0



LIMITATIONS:  None.



FINDINGS:  Fluoroscopic images centered over left side central line.  Tip overlies SVC.



IMPRESSION:  IMAGE(S) OBTAINED DURING PROCEDURE.



COMMENT:  Quality :  Final reports for procedures using fluoroscopy that document radiation exp
osure indices, or exposure time and number of fluorographic images (if radiation exposure indices are
 not available)

Please consult full operative report of the attending physician for description of the procedure.



TECHNICAL DOCUMENTATION:  JOB ID:  [id number]

 2011 [employer]- All Rights Reserved



Reading location - IP/workstation name: [email or web address]

## 2020-11-09 NOTE — CRITICAL CARE ADMISSION REPORT
HPI


Date:: 11/09/20


Time:: 10:00


Reason for ICU Reason:: Intubated post-op and needing HD


Admission Date/Time & PCP: 


Admission Date/Time: 11/08/20 14:09





 Primary Care Provider: MELISSA WOOTEN MD








HPI: 





This patient is a 69 yo man who underwent a hernia repair with mesh yesterday. 

The loop of SB was strangulated but was not necrotic and there was not a need 

for a resection. He is a peritoneal dialysis patient and today he had a perma-

cath placed in his L subclavian v. He was on bipap overnight and went up to 70%.

Because of this and concern for his volume status as he needs HD now, he was 

left intubated and brought to the ICU.


History obtained from:: Old records, Dr. Loo and anesthesia





- Diagnosis/Plan


(1) Hypotension after procedure


Is this a current diagnosis for this admission?: Yes   


Plan: 


After his perma-cath he will be started on levephed for a MAP , 65. His need for

HD also merits this. I anticipate he will be off levophed soon.








(2) ESRD on peritoneal dialysis


Is this a current diagnosis for this admission?: Yes   


Plan: 


His PD catheter will not be used as he recovers from abdominal surgery.








(3) Incarcerated umbilical hernia


Is this a current diagnosis for this admission?: Yes   


Plan: 


Resolved with surgery. No evidence of continued ischemia.








(4) Hyperkalemia, diminished renal excretion


Is this a current diagnosis for this admission?: Yes   


Plan: 


Potassium of 6.3 today requires HD.





Plan Summary: 





After HD we will see if he is extubatable.





Past Medical History


Cardiac Medical History: Reports: Hyperlipidema, Hypertension


   Denies: Coronary Artery Disease, Myocardial Infarction


Pulmonary Medical History: 


   Denies: Asthma, Bronchitis, Chronic Obstructive Pulmonary Disease (COPD) - 

SOB, Pneumonia


Neurological Medical History: 


   Denies: Seizures


Renal/ Medical History: Reports: End Stage Renal Disease


Malignancy Medical History: Reports: Skin Cancer - Left ear basal cell carcinoma


GI Medical History: 


   Denies: Hepatitis, Hiatal Hernia


Musculoskeltal Medical History: 


   Denies: Arthritis


Psychiatric Medical History: 


   Denies: Depression


Hematology: Reports: Anemia - HGB 9.3


   Denies: Sickle Cell Disease





Past Surgical History


Past Surgical History: Reports: Orthopedic Surgery - Back surgery


   Denies: Pacemaker





Social/Family History





- Social History


Smoking Status: Former Smoker


Frequency of Alcohol Use: None


Hx Recreational Drug Use: No


Drugs: None


Hx Prescription Drug Abuse: No





- Medication/Allergies


Home Medications: 








Aspirin [Ecotrin 81 mg EC Tablet] 81 mg PO DAILY 12/05/17 


L.acidoph,Paracasei, B.lactis [Probiotic] 1 cap PO DAILY 12/05/17 


Omega-3 Fatty Acids/Fish Oil [Omega 3 Fish Oil Softgel] 1 cap PO DAILY 12/05/17 


Furosemide [Lasix 40 mg Tablet] 40 mg PO BID 11/14/19 


Lysine 1 tab PO DAILY 11/14/19 


Magnesium Oxide [Mag-Ox 400 mg Tablet] 400 mg PO DAILY 11/14/19 


Simvastatin 20 mg PO DAILY 11/14/19 


Amlodipine Besylate [Norvasc 5 mg Tablet] 5 mg PO DAILY 11/09/20 


Carvedilol [Coreg 3.125 mg Tablet] 3.125 mg PO Q12 11/09/20 


Fluticasone Propionate [Flonase Nasal Spray 50 Mcg/Spray 16 gm] 1 spray NAREB 

DAILY 11/09/20 


Hydroxyzine HCl [Atarax 10 mg Tablet] 25 mg PO TIDP PRN 11/09/20 








Allergies/Adverse Reactions: 


                                        





Penicillins Allergy (Verified 11/08/20 09:06)


   RASH











Review of Systems


ROS unobtainable: Due to endotracheal tube





Physical Exam


Vital Signs: 


                                        











Temp Pulse Resp BP Pulse Ox


 


 98.0 F   105 H  22 H  117/49 L  95 


 


 11/09/20 07:55  11/09/20 07:55  11/09/20 08:22  11/09/20 07:55  11/09/20 08:22








                                 Intake & Output











 11/08/20 11/09/20 11/10/20





 06:59 06:59 06:59


 


Intake Total  3975 


 


Output Total  1995 


 


Balance  1980 


 


Weight  121.3 kg 








                                  Weight/Height





Weight                           121.3 kg


Height                           6 ft 2 in








General appearance: PRESENT: no acute distress, obese


Head exam: PRESENT: atraumatic, normocephalic


Eye exam: PRESENT: conjunctiva pink, EOMI, PERRLA.  ABSENT: scleral icterus


Ear exam: PRESENT: normal external ear exam


Mouth exam: PRESENT: moist, tongue midline


Neck exam: ABSENT: carotid bruit, JVD, lymphadenopathy, thyromegaly


Respiratory exam: PRESENT: clear to auscultation tasha, decreased breath sounds.  

ABSENT: rales, rhonchi, wheezes


Cardiovascular exam: PRESENT: RRR.  ABSENT: diastolic murmur, rubs, systolic 

murmur


GI/Abdominal exam: PRESENT: normal bowel sounds, soft, other - PD catheter site 

clean.  ABSENT: distended, guarding, mass, organolmegaly, rebound, tenderness


Rectal exam: PRESENT: deferred


Gentrourinary exam: PRESENT: indwelling catheter


Extremities exam: PRESENT: full ROM.  ABSENT: calf tenderness, clubbing, pedal 

edema


Musculoskeletal exam: PRESENT: normal inspection


Neurological exam: PRESENT: other - Sedated


Tubes/Lines: PRESENT: Endotracheal Tube, Central Line, Dialysis catheter, 

Nasogastic Tube





Laboratory/Radiographs


Laboratory Results: 


                                        





                                 11/09/20 05:00 





                                 11/09/20 05:00 





                                        











  11/08/20 11/09/20 11/09/20





  11:00 02:05 05:00


 


WBC    4.1


 


RBC    2.75 L


 


Hgb    9.3 L


 


Hct    27.0 L


 


MCV    98 H


 


MCH    33.6 H


 


MCHC    34.3


 


RDW    14.7 H


 


Plt Count    331


 


Seg Neutrophils %    83.1 H


 


Carbonic Acid  1.02 L  1.24 


 


HCO3/H2CO3 Ratio  27:1  24:1 


 


ABG pH  7.54 H  7.49 H 


 


ABG pCO2  33.8 L  41.3 


 


ABG pO2  58.1 L  73.0 L 


 


ABG HCO3  28.4 H  30.6 H 


 


ABG O2 Saturation  93.3 L  95.7 


 


ABG Base Excess  5.9  6.7 


 


FiO2  ROOM AIR  100% 


 


Sodium   


 


Potassium   


 


Chloride   


 


Carbon Dioxide   


 


Anion Gap   


 


BUN   


 


Creatinine   


 


Est GFR (African Amer)   


 


Glucose   


 


Lactic Acid   


 


Calcium   


 


Total Bilirubin   


 


AST   


 


Alkaline Phosphatase   


 


Total Protein   


 


Albumin   














  11/09/20 11/09/20





  05:00 07:52


 


WBC  


 


RBC  


 


Hgb  


 


Hct  


 


MCV  


 


MCH  


 


MCHC  


 


RDW  


 


Plt Count  


 


Seg Neutrophils %  


 


Carbonic Acid  


 


HCO3/H2CO3 Ratio  


 


ABG pH  


 


ABG pCO2  


 


ABG pO2  


 


ABG HCO3  


 


ABG O2 Saturation  


 


ABG Base Excess  


 


FiO2  


 


Sodium  136.7 L 


 


Potassium  6.3 H* D 


 


Chloride  74 L 


 


Carbon Dioxide  29 


 


Anion Gap  34 H 


 


BUN  110 H 


 


Creatinine  17.08 H 


 


Est GFR (African Amer)  3 L 


 


Glucose  121 H 


 


Lactic Acid   3.0 H


 


Calcium  7.3 L 


 


Total Bilirubin  0.5 


 


AST  157 H 


 


Alkaline Phosphatase  49 


 


Total Protein  6.2 L 


 


Albumin  3.7 











Impressions: 


                                        





Abdomen/Pelvis CT  11/08/20 10:23


IMPRESSION:  Small bowel infarction secondary to incarcerated hernia at the 

umbilicus and proximal dilatation of small bowel loops.


Gas in the SMV and portal venous system in the liver.


 











All labs, radiographs, diagnostic studies and EKGs were personally reviewed: Yes


In addition, reports of radiographic and diagnostic studies were read: Yes





Critical Time


Critical Time (minutes): 45


-: 


The care of a critically ill patient is dynamic.  This note represents a static 

moment in the admission process. Orders and treatments may be given 

simultaneously and urgently, and time is not representative of the treatment 

process.





This patient requires Critical Care secondary to life threatening organ or limb 

dysfunction.  Without Critical Care services, the patient is at risk for 

increased mortality and morbidity.

## 2020-11-09 NOTE — RADIOLOGY REPORT (SQ)
CHEST X-RAY 1 VIEW on 11/9/2020 at 1:58 AM 



CLINICAL INDICATION: NG tube placement



COMPARISON: 11/8/2020



FINDINGS: NG tube extends into the upper stomach. Right IJ

catheter tip is in the SVC. There is mild bibasilar atelectasis.

Heart is within normal limits for size. Vascular calcification is

noted in the aorta. A few wires are noted projecting over the

chest. Lungs are otherwise clear.



IMPRESSION: No significant change in the appearance of the chest.

## 2020-11-09 NOTE — OPERATIVE REPORT
Operative Report


DATE OF SURGERY: 11/09/20


PREOPERATIVE DIAGNOSIS: 1.  End-stage renal failure.  2.  Status post umbilical 

herniorrhaphy.  3.  CHF.  4.  Hyperkalemia


POSTOPERATIVE DIAGNOSIS: Same


OPERATION: 1.  Focused ultrasound of the left neck.  2.  Attempted insertion of 

dual split 32 cm tip to cuff catheter into left internal jugular vein and.  3.  

Intraoperative fluoroscopy with limited venogram.  4.  Successful insertion of a

dual split 32 cm tip to cuff permacatheter into left subclavian vein


SURGEON: BOBBY SAHU


ANESTHESIA: GA


TISSUE REMOVED OR ALTERED: None


COMPLICATIONS: 





None


ESTIMATED BLOOD LOSS: Minimal


INTRAOPERATIVE FINDINGS: See below


PROCEDURE: 





The patient was taken from the preop holding area while on BiPAP to operating 

room for orders placed on the operating table, and underwent general 

endotracheal intubation by the anesthesia staff without difficulty.





He was placed in supine position arms tucked, neck rotated to the right, and 

chest wall prepped and draped in sterile fashion.  Hair was clipped prior to 

prepping.





Patient had a right internal jugular vein triple-lumen catheter placed 24 hours 

prior.











The left neck was scanned with a variable frequency linear transducer.  Findings

are significant for patent, small left internal jugular vein.  The overlying 

skin was anesthetized with 1% plain lidocaine.  Carlos was made in the skin with 

11 blade, micro needle and wire threaded into the left internal jugular vein.





A suitable site under the left clavicle was chosen for exit of the 

permacatheter.  Skin was anesthetized with 1% plain lidocaine, opening made in 

the skin with a 15 blade, and a dual-lumen 32 cm tip to cuff catheter was 

threaded between the 2 lesions.





Under fluoroscopic guidance, the micro wire was switched over to a conventional 

0.030 inch guidewire uneventfully.  Unfortunately the wire which crossed from 

the left chest to the right chest would not pass more proximally into either the

atrium or the IVC.  Therefore the conventional guidewire was removed and a 

Glidewire was inserted.  This wire was advanced into the right atrium and/or the

IVC, paralleling the right previously placed internal jugular central venous 

catheter.





Now under fluoroscopic guidance, we dilated up the tract with the small, medium,

and large dilators and finally the largest dilator with the introducer sheath 

was threaded over the Glidewire.  We then removed the dilator, advanced to the 

Glidewire through the distal end of the permacatheter, and removed the strip 

away sheath, leaving the catheter in the left internal jugular vein.





Under fluoroscopic guidance, with multiple manipulations of the catheter, 

requiring extension of the left neck incision in order to manipulate the 

catheter, I was eventually able to get the 2 tips of the catheter sitting in a 

vertical orientation parallel to the right internal jugular vein catheter.  

Attempts were made to aspirate the catheter from either chamber but were 

unsuccessful.  The catheter seems to flushed easily.





I now performed intraoperative vein obviously with full-strength contrast 

approximately 10 cc.  This showed no tentative passage into the venous system, 

with what appeared to be extravasation.





At this point the patient made hemodynamically stable, however I felt that this 

catheter was not in the appropriate position, and removal so it was removed from

the patient's chest uneventfully.





Patient remained hemodynamically stable with good sats.  I felt it was safe to 

proceed with an alternative approach.  We now had a left subclavian incision so 

I therefore opted to place a left subclavian permacatheter.  Patient was placed 

back in Trendelenburg, 16-gauge needle threaded into the left subclavian vein 

without difficulty and the conventional guidewire threaded into position.





I made a counterincision below the initial subclavian incision for the exit of 

the subclavian catheter.  The catheter was tunneled between the 2 incisions, 

then under fluoroscopic guidance, the subclavian vein was dilated up using the 

small, medium, and large dilators.  Eventually the largest dilator with the 

introducer sheath was threaded into the subclavian vein, dilator and wire 

removed, and the free end of the 32 cm tip to cuff permacatheter was threaded 

into the left subclavian vein.  Strip away sheath was removed.  The tip of the 

catheter appeared to be in the right atrium.  There is no kinking of the 

catheter.  It aspirated and flushed without difficulty.





I felt the operation was complete.  The patient remained hemodynamically stable 

with good saturations.  Catheter secured to the skin at 3 sites with 2-0 Prolene

suture and Biopatch.  All wounds closed with 3-0 Vicryl, and sterile dressings 

applied.





Patient tolerated procedure well, taken to the intensive care unit in guarded 

condition, meaning intubated.  Portable upright chest x-ray pending at time 

dictation.  Also catheter will be loaded with concentrated heparin upon arrival 

in the ICU.

## 2020-11-09 NOTE — EKG REPORT
SEVERITY:- ABNORMAL ECG -

SINUS TACHYCARDIA

RBBB AND LAFB

:

Confirmed by: Beena Melgar 09-Nov-2020 07:51:22

## 2020-11-09 NOTE — PDOC CONSULTATION
Consultation


Consult Date: 11/09/20


Provider Consulted: XIMENA TONY


Consult reason:: ESRD





History of Present Illness


Admission Date/PCP: 


  11/08/20 14:09





  MELISSA WOOTEN MD





History of Present Illness: 


ZAKIA GOODWIN is a 70 year old  gentleman known to me with 

history of end-stage renal disease on peritoneal dialysis secondary to 

polycystic kidney disease, hypertension, hyperlipidemia, and anemia who 

presented to the ED yesterday with acute 1 day history of abdominal pain.  CT 

scan was done in the ED which showed strangulated umbilical hernia with 

infarcted small bowel and gas in the portal vein system.  Surgery was consulted 

and Dr. Lilly immediately brought the patient to the operating room yesterday 

and he underwent umbilical herniorrhaphy with absorbable mesh.  No bowel 

resection was done and according to Dr. Lilly who I spoke to yesterday there 

was immediate perfusion of the bowel after herniorrhaphy.  Dr. Lilly also told

me that there was no evidence of infection so we talked about leaving the PD 

catheter if it is not compromised which is what he did.  Nevertheless the 

patient needs bowel rest and will not be able to do peritoneal dialysis so I als

o asked Dr. Lilly to have the patient scheduled for PermCath this morning 

which was performed by Dr. Cardenas.  Yesterday postoperatively the patient was 

actually admitted in the regular floor on BiPAP but towards the morning he has 

increased oxygen requirement.  After the PermCath the patient continues to be 

intubated and he was hypotensive so he was transferred to intensive care unit.





This afternoon I am seeing the patient during dialysis in the intensive care 

unit.  He is intubated and is hypotensive on 2 pressors including Levophed and 

vasopressin.  We are holding any ultrafiltration.  His chest x-ray did not 

really show any pulmonary congestion so I do not think the patient is really 

fluid overloaded at this point.  He is being monitored carefully and 

continuously throughout dialysis treatment.





Past Medical History


Cardiac Medical History: Reports: Hyperlipidemia, Hypertension-primary


Neurological Medical History: Reports: Other - TIA


Renal/ Medical History: Reports: End Stage Renal Disease, Hyperkalemia, 

Hypernatremia, Hyperphosphatemia, Metabolic Acidosis, Proteinuria, Secondary Hy

perparathyroidism, Other - Polycystic kidney disease


Malignancy Medical History: Reports: Skin Cancer - Left ear basal cell carcinoma


GI Medical History: Reports: Other - Diverticulosis


Musculoskeltal Medical History: Reports: Arthritis, Other - Spinal stenosis


Hematology Medical History: Reports Anemia of Chronic Kidney Disease





Past Surgical History


Past Surgical History: Reports: Dialysis Access Surgery PD, Orthopedic Surgery -

Back surgery





Social History


Information Source: Dr. Office, On license of [facility] Records


Lives with: Alone


Smoking Status: Former Smoker


Electronic Cigarette use?: No


Frequency of Alcohol Use: Occasional


Hx Recreational Drug Use: No


Drugs: None


Hx Prescription Drug Abuse: No





- Advance Directive


Resuscitation Status: Full Code





Family History


Family History: CAD - Parents and brother, DM - Brother and father


Parental Family History Reviewed: Yes


Children Family History Reviewed: Yes


Sibling(s) Family History Reviewed.: Yes





Medication/Allergy


Home Medications: 








Furosemide [Lasix 40 mg Tablet] 40 mg PO BID 11/14/19 


Magnesium Oxide [Mag-Ox 400 mg Tablet] 400 mg PO DAILY 11/14/19 


Amlodipine Besylate [Norvasc 5 mg Tablet] 5 mg PO DAILY 11/09/20 


B Complex W-C No.20/Folic Acid [Virt-Caps Softgel] 1 mg PO DAILY 11/09/20 


Carvedilol [Coreg 3.125 mg Tablet] 3.125 mg PO Q12 11/09/20 


Ergocalciferol (Vitamin D2) [Drisdol 50,000 Unit (1.25MG) Capsule] 50,000 unit 

PO .QWEEKLY 11/09/20 


Fluticasone Propionate [Flonase Nasal Spray 50 Mcg/Spray 16 gm] 1 spray NAREB 

DAILY 11/09/20 


Hydroxyzine HCl [Atarax 10 mg Tablet] 25 mg PO TIDP PRN 11/09/20 








Allergies/Adverse Reactions: 


                                        





Penicillins Allergy (Verified 11/08/20 09:06)


   RASH











Review of Systems


ROS unobtainable: Due to endotracheal tube





Physical Exam


Vital Signs: 


                                        











Temp Pulse Resp BP Pulse Ox


 


 96.6 F L  105 H  15   123/59 L  92 


 


 11/09/20 12:00  11/09/20 07:55  11/09/20 14:31  11/09/20 14:31  11/09/20 14:31








                                 Intake & Output











 11/08/20 11/09/20 11/10/20





 06:59 06:59 06:59


 


Intake Total  3975 441


 


Output Total  1995 0


 


Balance  1980 441


 


Weight  121.3 kg 








Vitals during dialysis: Blood pressure 80/50 on Levophed and about to start 

vasopressin, heart rate of 97, respiration of 16, oxygen saturation 96%, blood 

flow rate of 350 mL/min and dialysate flow rate of 800 mL/min.


Exam: 


General appearance: Intubated and sedated


Head exam: PRESENT: atraumatic, normocephalic


Eye exam: PRESENT: Eyes closed


Mouth exam: PRESENT: moist, neck supple, tongue midline


Neck exam: PRESENT: full ROM.  ABSENT: carotid bruit, JVD, lymphadenopathy, 

thyromegaly


Respiratory exam: PRESENT: clear to auscultation bilaterally.  ABSENT: rales, 

rhonchi, stridor, wheezes


Cardiovascular exam: PRESENT: RRR, +S1, +S2.  ABSENT: systolic murmur


Pulses: PRESENT: normal radial pulses, normal dorsalis pedis pulses


GI/Abdominal exam: PRESENT: normal bowel sounds, soft.  Dressing in place PD 

catheter transfer set also in place.  ABSENT: guarding, mass, tenderness


Rectal exam: Deferred


Extremities exam: PRESENT: full ROM.  ABSENT: calf tenderness, pedal edema


Musculoskeletal: ABSENT: deformity


Neurological exam: PRESENT: Sedated


Psychiatric exam: PRESENT: Cannot be assessed at this time


Skin exam: PRESENT: intact, dry, warm.  ABSENT: rash








Results


Laboratory Results: 


                                        





                                 11/09/20 05:00 





                                 11/09/20 05:00 





                                        











  11/09/20 11/09/20 11/09/20





  02:05 05:00 05:00


 


WBC   4.1 


 


RBC   2.75 L 


 


Hgb   9.3 L 


 


Hct   27.0 L 


 


MCV   98 H 


 


MCH   33.6 H 


 


MCHC   34.3 


 


RDW   14.7 H 


 


Plt Count   331 


 


Seg Neutrophils %   83.1 H 


 


Carbonic Acid  1.24  


 


HCO3/H2CO3 Ratio  24:1  


 


ABG pH  7.49 H  


 


ABG pCO2  41.3  


 


ABG pO2  73.0 L  


 


ABG HCO3  30.6 H  


 


ABG O2 Saturation  95.7  


 


ABG Base Excess  6.7  


 


FiO2  100%  


 


Sodium    136.7 L


 


Potassium    6.3 H* D


 


Chloride    74 L


 


Carbon Dioxide    29


 


Anion Gap    34 H


 


BUN    110 H


 


Creatinine    17.08 H


 


Est GFR (African Amer)    3 L


 


Glucose    121 H


 


Lactic Acid   


 


Calcium    7.3 L


 


Total Bilirubin    0.5


 


AST    157 H


 


Alkaline Phosphatase    49


 


Total Protein    6.2 L


 


Albumin    3.7














  11/09/20 11/09/20





  07:52 13:25


 


WBC  


 


RBC  


 


Hgb  


 


Hct  


 


MCV  


 


MCH  


 


MCHC  


 


RDW  


 


Plt Count  


 


Seg Neutrophils %  


 


Carbonic Acid   1.36 H


 


HCO3/H2CO3 Ratio   20:1


 


ABG pH   7.41


 


ABG pCO2   45.2 H


 


ABG pO2   74.7 L


 


ABG HCO3   27.9 H


 


ABG O2 Saturation   95.0


 


ABG Base Excess   2.9


 


FiO2   60%


 


Sodium  


 


Potassium  


 


Chloride  


 


Carbon Dioxide  


 


Anion Gap  


 


BUN  


 


Creatinine  


 


Est GFR (African Amer)  


 


Glucose  


 


Lactic Acid  3.0 H 


 


Calcium  


 


Total Bilirubin  


 


AST  


 


Alkaline Phosphatase  


 


Total Protein  


 


Albumin  











Impressions: 


                                        





Abdomen/Pelvis CT  11/08/20 10:23


IMPRESSION:  Small bowel infarction secondary to incarcerated hernia at the 

umbilicus and proximal dilatation of small bowel loops.


Gas in the SMV and portal venous system in the liver.


 








Chest X-Ray  11/09/20 09:40


IMPRESSION:  Interval placement of an endotracheal tube and left large-bore 

central venous catheter.  Tubes and lines in good position.  Trace bilateral 

pleural effusions.


 














Assessment & Plan





- Diagnosis


(1) End stage renal disease


Is this a current diagnosis for this admission?: Yes   


Plan: 


We will do dialysis today for 3 hours, using the patient's PermCath, with 1 

potassium bath for an hour followed by 2  potassium bath, blood flow rate of 350

mL per minute, dialysate flow rate of 800 mL per minute, ultrafiltration 0 to 1 

L as tolerated, no heparin and Procrit with 10,000 units during dialysis 

intravenously.  Since the patient is relatively hypotensive, I think he can do 

away without ultrafiltration if he is unable to tolerate it.  He is currently 

being monitored closely in ICU.








(2) Incarcerated umbilical hernia


Is this a current diagnosis for this admission?: Yes   


Plan: 


Status post umbilical herniorrhaphy with mesh, 11/8/2020.








(3) Hypotension


Is this a current diagnosis for this admission?: Yes   


Plan: 


Post PermCath procedure.  It could be due to anesthesia.  Currently on Levophed 

and vasopressin.








(4) Hyperkalemia


Is this a current diagnosis for this admission?: Yes   


Plan: 


Dialysis today as above.








(5) Anemia in chronic kidney disease (CKD)


Is this a current diagnosis for this admission?: Yes   


Plan: 


Retacrit during dialysis.








- Notes


Notes: 





Thank you very much for this consultation.

## 2020-11-09 NOTE — RADIOLOGY REPORT (SQ)
EXAM DESCRIPTION:  CHEST SINGLE VIEW



IMAGES COMPLETED DATE/TIME:  11/9/2020 10:33 am



REASON FOR STUDY:  Post intubation and line insertion



COMPARISON:  Chest radiograph 11/9/2020 at 0158 hours



EXAM PARAMETERS:  NUMBER OF VIEWS: One view.

TECHNIQUE: Single frontal radiographic view of the chest acquired.

RADIATION DOSE: NA

LIMITATIONS: None.



FINDINGS:  LUNGS AND PLEURA: Trace bilateral pleural effusions with fluid in the right fissure and at
 the left lung base.  No pneumothorax.  No focal consolidation.

MEDIASTINUM AND HILAR STRUCTURES: No masses.  Contour normal.

HEART AND VASCULAR STRUCTURES: Heart normal in size.  Normal vasculature.

BONES: No acute findings.

HARDWARE: Esophagogastric tube tip is below the diaphragm unchanged from prior.  Endotracheal tube is
 in the upper trachea about 7.6 cm above the ree new from prior.  Right IJ central venous catheter
 is unchanged.  New left large-bore central venous catheter with tip in the SVC near the cavoatrial j
unction.

OTHER: No other significant finding.



IMPRESSION:  Interval placement of an endotracheal tube and left large-bore central venous catheter. 
 Tubes and lines in good position.  Trace bilateral pleural effusions.



TECHNICAL DOCUMENTATION:  JOB ID:  [id number]

 2011 [employer]- All Rights Reserved



Reading location - IP/workstation name: [email or web address]

## 2020-11-09 NOTE — RADIOLOGY REPORT (SQ)
EXAM DESCRIPTION: 



XR CHEST 1 VIEW



COMPLETED DATE/TME:  11/09/2020 23:28



CLINICAL HISTORY: TLC placement 



COMPARISON: 11/9/2020



FINDINGS: Single frontal view of the chest. 



Tubes and lines: Endotracheal tube with tip 3 cm above the

ree. NG tube with tip below the diaphragm. Leads overlie the

chest. Right IJ central venous catheter with tip in the SVC. Left

subclavian temporary hemodialysis catheter with tip in the high

right atrium.

Cardiomediastinal silhouette: Stable 

Lungs: Low lung volumes. Patchy left perihilar and left basilar

opacities are stable. Possible small bilateral pleural effusions.

No pneumothorax. 

Bones: Stable. 

Upper abdomen: Stable.



IMPRESSION:



1. Stable appearance of the chest.

## 2020-11-10 NOTE — PDOC CRITICAL CARE PROG REPORT
General


Date:: 11/10/20


ICU Day:: 2


Hospital Day:: 2


Resuscitation Status: Full Code


Events in the past 12 to 24 Hours:: 





Extubated and received hemodialysis.


Review of systems relevant to events:: 





Renal, GI


Reason for ICU Addmission:: Intubated post-op and needing HD





- Medications:


Medications reviewed and adjusted accordingly: Yes


Vasopressors:: 





Vasopressin


Sedation:: 





None





Physical Exam


Vital Signs: 


                                        











Temp Pulse Resp BP Pulse Ox


 


 98.4 F   82   21 H  101/62   98 


 


 11/10/20 08:00  11/10/20 08:00  11/10/20 08:00  11/10/20 08:00  11/10/20 08:00








                                 Intake & Output











 11/09/20 11/10/20 11/11/20





 06:59 06:59 06:59


 


Intake Total 3975 1730 98


 


Output Total 1995 5 0


 


Balance 1980 1725 98


 


Weight 121.3 kg 122.8 kg 








                                  Weight/Height





Weight                           122.8 kg


Height                           6 ft 2 in








General appearance: PRESENT: no acute distress


Head exam: PRESENT: atraumatic, normocephalic


Eye exam: PRESENT: conjunctiva pink, EOMI, PERRLA.  ABSENT: scleral icterus


Ear exam: PRESENT: normal external ear exam


Mouth exam: PRESENT: moist, tongue midline


Respiratory exam: PRESENT: clear to auscultation tasha.  ABSENT: rales, rhonchi, 

wheezes


Cardiovascular exam: PRESENT: RRR.  ABSENT: diastolic murmur, rubs, systolic 

murmur


GI/Abdominal exam: PRESENT: normal bowel sounds, soft.  ABSENT: distended, 

guarding, mass, organolmegaly, rebound, tenderness


Rectal exam: PRESENT: deferred


Gentrourinary exam: PRESENT: indwelling catheter


Extremities exam: PRESENT: full ROM.  ABSENT: calf tenderness, clubbing, pedal 

edema


Musculoskeletal exam: PRESENT: normal inspection


Neurological exam: PRESENT: alert, awake, CN II-XII grossly intact, other - 

Sleepy from sedation


Skin exam: PRESENT: dry, intact, warm.  ABSENT: cyanosis, rash


Tubes/Lines: PRESENT: Dialysis catheter





Laboratory/Radiographs


Laboratory Results: 


                                        





                                 11/10/20 08:17 





                                 11/10/20 03:23 





                                        











  11/08/20 11/09/20 11/09/20





  14:05 13:25 21:25


 


WBC    5.8


 


RBC    2.33 L


 


Hgb    7.8 L


 


Hct    23.1 L


 


MCV    100 H


 


MCH    33.5 H


 


MCHC    33.7


 


RDW    14.7 H


 


Plt Count    285


 


Seg Neutrophils %    Not Reportable


 


Carbonic Acid   1.36 H 


 


HCO3/H2CO3 Ratio   20:1 


 


ABG pH   7.41 


 


ABG pCO2   45.2 H 


 


ABG pO2   74.7 L 


 


ABG HCO3   27.9 H 


 


ABG O2 Saturation   95.0 


 


ABG Base Excess   2.9 


 


FiO2   60% 


 


Sodium   


 


Potassium   


 


Chloride   


 


Carbon Dioxide   


 


Anion Gap   


 


BUN   


 


Creatinine   


 


Est GFR (African Amer)   


 


Glucose   


 


Calcium   


 


Magnesium   


 


Blood Type  A POSITIVE  


 


Antibody Screen  NEGATIVE  














  11/09/20 11/09/20 11/10/20





  21:25 21:25 03:23


 


WBC   


 


RBC   


 


Hgb   


 


Hct   


 


MCV   


 


MCH   


 


MCHC   


 


RDW   


 


Plt Count   


 


Seg Neutrophils %   


 


Carbonic Acid  1.21  


 


HCO3/H2CO3 Ratio  22:1  


 


ABG pH  7.46 H  


 


ABG pCO2  40.2  


 


ABG pO2  69.0 L  


 


ABG HCO3  27.8 H  


 


ABG O2 Saturation  94.7  


 


ABG Base Excess  3.6  


 


FiO2  50%  


 


Sodium   134.4 L  135.3 L


 


Potassium   5.9 H  6.0 H*


 


Chloride   88 L  89 L


 


Carbon Dioxide   29  29


 


Anion Gap   17  17


 


BUN   59 H D  65 H


 


Creatinine   9.35 H  10.20 H


 


Est GFR ( Amer)   7 L  6 L


 


Glucose   134 H  142 H


 


Calcium   7.5 L  7.1 L


 


Magnesium   2.1 


 


Blood Type   


 


Antibody Screen   














  11/10/20





  08:17


 


WBC  4.9


 


RBC  2.37 L


 


Hgb  8.0 L


 


Hct  23.2 L


 


MCV  98 H


 


MCH  33.8 H


 


MCHC  34.6


 


RDW  15.8 H


 


Plt Count  228


 


Seg Neutrophils %  Not Reportable


 


Carbonic Acid 


 


HCO3/H2CO3 Ratio 


 


ABG pH 


 


ABG pCO2 


 


ABG pO2 


 


ABG HCO3 


 


ABG O2 Saturation 


 


ABG Base Excess 


 


FiO2 


 


Sodium 


 


Potassium 


 


Chloride 


 


Carbon Dioxide 


 


Anion Gap 


 


BUN 


 


Creatinine 


 


Est GFR (African Amer) 


 


Glucose 


 


Calcium 


 


Magnesium 


 


Blood Type 


 


Antibody Screen 











Impressions: 


                                        





Abdomen/Pelvis CT  11/08/20 10:23


IMPRESSION:  Small bowel infarction secondary to incarcerated hernia at the 

umbilicus and proximal dilatation of small bowel loops.


Gas in the SMV and portal venous system in the liver.


 








Guidance Fluoroscopy  11/09/20 00:00


IMPRESSION:  IMAGE(S) OBTAINED DURING PROCEDURE.


 








Chest X-Ray  11/09/20 09:40


IMPRESSION:  Interval placement of an endotracheal tube and left large-bore 

central venous catheter.  Tubes and lines in good position.  Trace bilateral 

pleural effusions.


 











All labs, radiographs, diagnostic studies and EKGs were personally reviewed: Yes


In addition, reports of radiographic and diagnostic studies were read: Yes





Assessment and Plan





- Diagnosis


(1) Hypotension after procedure


Is this a current diagnosis for this admission?: Yes   


Plan: 


He is off levophed. On small dose of vasopressin. I anticipate this will be 

weaned off now that he is off positive pressure.








(2) ESRD on peritoneal dialysis


Is this a current diagnosis for this admission?: Yes   


Plan: 


At this time due to abdominal surgery he cannot use PD at this time.








(3) Incarcerated umbilical hernia


Is this a current diagnosis for this admission?: Yes   


Plan: 


Resolved








(4) Hyperkalemia, diminished renal excretion


Is this a current diagnosis for this admission?: Yes   


Plan: 


Level is 6.0. He may need HD today.





Plan Summary: 





Assess for HD and hope to wean off vasopressin.





Critical Time


Critical Time (minutes): 35


Level of Care: ICU


Anticipated discharge: Home


Anticipated DC Timeframe: Other


-: 


1.  The care of a critical patient is a dynamic process.  This note is a 

representative synopsis but static in nature.  The timeframe for treatments 

given in order is not necessarily the actual time these treatments may have been

done.





2.  This patient requires critical care secondary to ongoing requirements for 

therapy not offered or safe outside the critical care environment.  Transfer to 

a lower level of care will result in altered life or limb morbidity and 

mortality.





3.  Multidisciplinary rounds completed.





4.  ABCDE bundle addressed.

## 2020-11-10 NOTE — PDOC PROGRESS REPORT
Subjective


Date:: 11/10/20


Subjective:: 





I am seeing the patient during dialysis today.  He was extubated earlier today. 

He is currently awake and answering few questions but he indicates that he feels

tired and not saying much.  He is currently on the face tent and is doing well. 

His potassium was elevated again today at 6.0 despite dialysis yesterday so we 

are dialyzing him again for this reason.  So far his tolerating dialysis well 

with good blood pressures.


Reason For Visit: 


HYPOXIC RESPIRATORY FAILURE, NEEDS DIALYSIS,








Physical Exam


Vital Signs: 


                                        











Temp Pulse Resp BP Pulse Ox


 


 98.4 F   80   18   93/52 L  95 


 


 11/10/20 10:00  11/10/20 10:20  11/10/20 10:20  11/10/20 10:20  11/10/20 10:20








                                 Intake & Output











 11/09/20 11/10/20 11/11/20





 06:59 06:59 06:59


 


Intake Total 3975 1730 98


 


Output Total 1995 5 0


 


Balance 1980 1725 98


 


Weight 121.3 kg 122.8 kg 








Vitals during dialysis: Blood pressure 116/60, heart rate of 92, oxygen 

saturation of 96%, respiration of 24, blood flow rate of 350 mL/min and d

ialysate flow rate of 800 mL/min.


Exam: 





General appearance:  PRESENT: no acute distress, tolerating face tent, 

cooperative, well-developed, well-nourished 


Head exam:  PRESENT: atraumatic, normocephalic Eye exam:  PRESENT: conjunctiva 

pale, PERRLA.  ABSENT: scleral icterus Neck exam:  ABSENT: JVD 


Respiratory exam:  PRESENT: Normal breath sounds.  ABSENT: crackles, rales, 

rhonchi, unlabored, wheezes 


Cardiovascular exam:  PRESENT: Regular rate rhythm -+S1, +S2.  ABSENT: diastolic

murmur, systolic murmur 


GI/Abdominal exam:  PRESENT: normal bowel sounds, soft.  ABSENT: guarding, mass,

tenderness 


Extremities exam:  trace edema 


Neurological exam:  PRESENT: alert, awake, oriented to person, place and time.  

Skin exam: PRESENT: dry, warm,





Results


Laboratory Results: 


                                        





                                 11/10/20 08:17 





                                 11/10/20 03:23 





                                        











  11/08/20 11/09/20 11/09/20





  14:05 13:25 21:25


 


WBC    5.8


 


RBC    2.33 L


 


Hgb    7.8 L


 


Hct    23.1 L


 


MCV    100 H


 


MCH    33.5 H


 


MCHC    33.7


 


RDW    14.7 H


 


Plt Count    285


 


Seg Neutrophils %    Not Reportable


 


Carbonic Acid   1.36 H 


 


HCO3/H2CO3 Ratio   20:1 


 


ABG pH   7.41 


 


ABG pCO2   45.2 H 


 


ABG pO2   74.7 L 


 


ABG HCO3   27.9 H 


 


ABG O2 Saturation   95.0 


 


ABG Base Excess   2.9 


 


FiO2   60% 


 


Sodium   


 


Potassium   


 


Chloride   


 


Carbon Dioxide   


 


Anion Gap   


 


BUN   


 


Creatinine   


 


Est GFR (African Amer)   


 


Glucose   


 


Calcium   


 


Magnesium   


 


Blood Type  A POSITIVE  


 


Antibody Screen  NEGATIVE  














  11/09/20 11/09/20 11/10/20





  21:25 21:25 03:23


 


WBC   


 


RBC   


 


Hgb   


 


Hct   


 


MCV   


 


MCH   


 


MCHC   


 


RDW   


 


Plt Count   


 


Seg Neutrophils %   


 


Carbonic Acid  1.21  


 


HCO3/H2CO3 Ratio  22:1  


 


ABG pH  7.46 H  


 


ABG pCO2  40.2  


 


ABG pO2  69.0 L  


 


ABG HCO3  27.8 H  


 


ABG O2 Saturation  94.7  


 


ABG Base Excess  3.6  


 


FiO2  50%  


 


Sodium   134.4 L  135.3 L


 


Potassium   5.9 H  6.0 H*


 


Chloride   88 L  89 L


 


Carbon Dioxide   29  29


 


Anion Gap   17  17


 


BUN   59 H D  65 H


 


Creatinine   9.35 H  10.20 H


 


Est GFR ( Amer)   7 L  6 L


 


Glucose   134 H  142 H


 


Calcium   7.5 L  7.1 L


 


Magnesium   2.1 


 


Blood Type   


 


Antibody Screen   














  11/10/20





  08:17


 


WBC  4.9


 


RBC  2.37 L


 


Hgb  8.0 L


 


Hct  23.2 L


 


MCV  98 H


 


MCH  33.8 H


 


MCHC  34.6


 


RDW  15.8 H


 


Plt Count  228


 


Seg Neutrophils %  Not Reportable


 


Carbonic Acid 


 


HCO3/H2CO3 Ratio 


 


ABG pH 


 


ABG pCO2 


 


ABG pO2 


 


ABG HCO3 


 


ABG O2 Saturation 


 


ABG Base Excess 


 


FiO2 


 


Sodium 


 


Potassium 


 


Chloride 


 


Carbon Dioxide 


 


Anion Gap 


 


BUN 


 


Creatinine 


 


Est GFR (African Amer) 


 


Glucose 


 


Calcium 


 


Magnesium 


 


Blood Type 


 


Antibody Screen 











Impressions: 


                                        





Abdomen/Pelvis CT  11/08/20 10:23


IMPRESSION:  Small bowel infarction secondary to incarcerated hernia at the 

umbilicus and proximal dilatation of small bowel loops.


Gas in the SMV and portal venous system in the liver.


 








Guidance Fluoroscopy  11/09/20 00:00


IMPRESSION:  IMAGE(S) OBTAINED DURING PROCEDURE.


 








Chest X-Ray  11/09/20 09:40


IMPRESSION:  Interval placement of an endotracheal tube and left large-bore 

central venous catheter.  Tubes and lines in good position.  Trace bilateral 

pleural effusions.


 














Assessment & Plan





- Diagnosis


(1) End stage renal disease


Is this a current diagnosis for this admission?: Yes   


Plan: 


We will do dialysis today for 3 hours, using the patient's PermCath, with 1 

potassium bath for 2 hours followed by 2 potassium bath, blood flow rate of 350 

mL per minute, dialysate flow rate of 800 mL per minute, ultrafiltration 0-500 

MLS tolerated, no heparin and no Procrit.  Patient  being monitored closely 

during dialysis.





Due to abdominal surgery, we will continue hemodialysis until patient is cleared

to do peritoneal dialysis again by surgery.








(2) Incarcerated umbilical hernia


Is this a current diagnosis for this admission?: Yes   


Plan: 


Status post umbilical herniorrhaphy with mesh, 11/8/2020.








(3) Hypotension


Is this a current diagnosis for this admission?: Yes   


Plan: 


Improve blood pressure with very minimal vasopressin drip.








(4) Hyperkalemia


Is this a current diagnosis for this admission?: Yes   


Plan: 


Dialysis today.








(5) Anemia in chronic kidney disease (CKD)


Is this a current diagnosis for this admission?: Yes   


Plan: 


Retacrit during dialysis as needed.








- Time


Time with patient: 15-25 minutes

## 2020-11-10 NOTE — PDOC PROGRESS REPORT
Subjective


Date:: 11/10/20


Subjective:: 





Extubated, alert, responsive, appears confused at times, failed a swallowing fawn

dy post extubation


Reason For Visit: 


HYPOXIC RESPIRATORY FAILURE, NEEDS DIALYSIS,








Physical Exam


Vital Signs: 


                                        











Temp Pulse Resp BP Pulse Ox


 


 99.1 F   118 H  27 H  172/134 H  96 


 


 11/10/20 16:00  11/10/20 18:00  11/10/20 18:00  11/10/20 18:00  11/10/20 18:00








                                 Intake & Output











 11/09/20 11/10/20 11/11/20





 06:59 06:59 06:59


 


Intake Total 3975 1880 311


 


Output Total 1995 5 555


 


Balance 1980 1875 -244


 


Weight 121.3 kg 122.8 kg 











General appearance: PRESENT: mild distress, obese


Respiratory exam: PRESENT: clear to auscultation tasha


Cardiovascular exam: PRESENT: RRR


GI/Abdominal exam: PRESENT: distended, hypoactive bowel sounds, soft, other - 

Midline incision clean, dry, intact; lower abdominal Emilio drain filled with 

serosanguineous fluid





Results


Laboratory Results: 


                                        





                                 11/10/20 08:17 





                                 11/10/20 03:23 





                                        











  11/08/20 11/09/20 11/09/20





  14:05 21:25 21:25


 


WBC   5.8 


 


RBC   2.33 L 


 


Hgb   7.8 L 


 


Hct   23.1 L 


 


MCV   100 H 


 


MCH   33.5 H 


 


MCHC   33.7 


 


RDW   14.7 H 


 


Plt Count   285 


 


Seg Neutrophils %   Not Reportable 


 


Carbonic Acid    1.21


 


HCO3/H2CO3 Ratio    22:1


 


ABG pH    7.46 H


 


ABG pCO2    40.2


 


ABG pO2    69.0 L


 


ABG HCO3    27.8 H


 


ABG O2 Saturation    94.7


 


ABG Base Excess    3.6


 


FiO2    50%


 


Sodium   


 


Potassium   


 


Chloride   


 


Carbon Dioxide   


 


Anion Gap   


 


BUN   


 


Creatinine   


 


Est GFR (African Amer)   


 


Glucose   


 


Calcium   


 


Magnesium   


 


Blood Type  A POSITIVE  


 


Antibody Screen  NEGATIVE  














  11/09/20 11/10/20 11/10/20





  21:25 03:23 08:17


 


WBC    4.9


 


RBC    2.37 L


 


Hgb    8.0 L


 


Hct    23.2 L


 


MCV    98 H


 


MCH    33.8 H


 


MCHC    34.6


 


RDW    15.8 H


 


Plt Count    228


 


Seg Neutrophils %    Not Reportable


 


Carbonic Acid   


 


HCO3/H2CO3 Ratio   


 


ABG pH   


 


ABG pCO2   


 


ABG pO2   


 


ABG HCO3   


 


ABG O2 Saturation   


 


ABG Base Excess   


 


FiO2   


 


Sodium  134.4 L  135.3 L 


 


Potassium  5.9 H  6.0 H* 


 


Chloride  88 L  89 L 


 


Carbon Dioxide  29  29 


 


Anion Gap  17  17 


 


BUN  59 H D  65 H 


 


Creatinine  9.35 H  10.20 H 


 


Est GFR ( Amer)  7 L  6 L 


 


Glucose  134 H  142 H 


 


Calcium  7.5 L  7.1 L 


 


Magnesium  2.1  


 


Blood Type   


 


Antibody Screen   











Impressions: 


                                        





Abdomen/Pelvis CT  11/08/20 10:23


IMPRESSION:  Small bowel infarction secondary to incarcerated hernia at the 

umbilicus and proximal dilatation of small bowel loops.


Gas in the SMV and portal venous system in the liver.


 








Guidance Fluoroscopy  11/09/20 00:00


IMPRESSION:  IMAGE(S) OBTAINED DURING PROCEDURE.


 








Chest X-Ray  11/09/20 09:40


IMPRESSION:  Interval placement of an endotracheal tube and left large-bore 

central venous catheter.  Tubes and lines in good position.  Trace bilateral 

pleural effusions.


 














Assessment & Plan





- Diagnosis


(1) Strangulated umbilical hernia


Is this a current diagnosis for this admission?: Yes   





(2) ESRD on peritoneal dialysis


Is this a current diagnosis for this admission?: Yes   





(3) Small bowel obstruction with strangulation or infarction


Is this a current diagnosis for this admission?: Yes   





(4) Hypertension


Qualifiers: 


 


Is this a current diagnosis for this admission?: Yes   





- Time


Anticipated Discharge Disposition: Home with Home Health


Anticipated Discharge Timeframe: When patient became stable for discharge





- Plan Summary


Plan Summary: 





Assessment:





Postoperative day #2 following repair of a strangulated umbilical hernia


Patient currently extubated


Patient confused and has failed a swallowing study process condition


Monitor urine output as the patient has end-stage renal disease


Abdomen distended, midline incision clean, Emilio drain with serosanguineous 

fluid


Patient is being dialyzed today as per the nephrology service





Plan:





No acute general surgery issues identified


As soon as the patient is able to swallow, sips of clears and diet will be 

resumed


Other management as per nephrology and ICU team


Continue IV antibiotics


Continue Emilio drain monitoring

## 2020-11-10 NOTE — RADIOLOGY REPORT (SQ)
EXAM DESCRIPTION:

Site:  CHEST SINGLE VIEW

RP: XR CHEST 1 VIEW 



CLINICAL HISTORY:

70 years  Male;  central line placement ;



FINDINGS:



Since yesterday, endotracheal tube and enteric tube have been

removed. Left subclavian line remains in place, tip in the SVC.



Right IJ line tip is in the upper SVC. This is higher than on

prior exam.



Lungs are unchanged. No pneumothorax or pleural effusion.



IMPRESSION:

Extubated. Lines as described. No pneumothorax.

## 2020-11-10 NOTE — OPERATIVE REPORT
Bedside Procedure





- History of Present Illness


Indication for Procedure: sepsis with shock requiring pressors


Date: 11/10/20


Provider: MELISSA BAILEY





- Central Line


  ** Right Internal jugular


Time completed: 23:35


Consent obtained: Yes


Central line pre-insertion: Sterile PPE donned, Chloraprep applied, Sterile 

drapes applied


Central line size (Fr.): 7


Central line lumen type: Triple


Anesthetic type: 1% Lidocaine


mL's of anesthesia: 8


Ultrasound guided: Yes - sterile probe cover used


Line secured with sutures: Yes


Central line post-insertion: Blood return from lumens, Biopatch applied, 

Sutured, Sterile dressing applied, Position confirmed w/ CXR


Number of attempts: 1


Complications: No


Notes: 





11/10/20 23:47


removed old central line as nurse was unable to flush or draw blood from lumens.

Chest xray obtained central line was extracted from original insertion markings 

and had kinked off. I removed the line and reinserted a new line without 

difficulty.

## 2020-11-11 NOTE — RADIOLOGY REPORT (SQ)
EXAM DESCRIPTION: 



XR CHEST 1 VIEW



COMPLETED DATE/TME:  11/10/2020 23:51



CLINICAL HISTORY: line placement



COMPARISON: 11/9/2020



FINDINGS: Single frontal view of the chest. 



Tubes and lines: Leads overlie the chest. Right IJ central venous

catheter with tip in the SVC. Left subclavian temporary

hemodialysis catheter with tip in the high right atrium.

Cardiomediastinal silhouette: Stable 

Lungs: Low lung volumes. Patchy left perihilar and left basilar

opacities are stable. Possible small bilateral pleural effusions.

No pneumothorax. 

Bones: Stable. 

Upper abdomen: Stable.



IMPRESSION:



1. Stable appearance of the chest.



Electronically signed by:  Miguelito Box  11/10/2020 11:14

## 2020-11-11 NOTE — PDOC PROGRESS REPORT
Subjective


Date:: 11/11/20


Reason For Visit: 


Patient seen on dialysis today in the ICU.  Mr Smith  is 70 old  

gentleman known to me with history of end-stage renal disease on peritoneal 

dialysis secondary to polycystic kidney disease, hypertension, hyperlipidemia, 

and anemia who presented to the ED with acute 1 day history of abdominal pain.  

CT scan was done in the ED which showed strangulated umbilical hernia with 

infarcted small bowel and gas in the portal vein system.  Surgery was consulted 

and Dr. Lilly immediately brought the patient to the operating room yesterday 

and he underwent umbilical herniorrhaphy with absorbable mesh.  No bowel 

resection was done and according to Dr. Lilly there was immediate perfusion of

the bowel after herniorrhaphy.  As per Dr. Lilly, there was no evidence of 

infection so the PD catheter did not look compromised and so was left alone.  

Nevertheless the patient needs bowel rest and will not be able to do peritoneal 

dialysis and so the patient was scheduled for PermCath which was performed by 

Dr. Cardenas.  Postoperatively the patient was actually admitted in the regular 

floor on BiPAP but towards the morning he has increased oxygen requirement.  

After the PermCath the patient continues to be intubated and he was hypotensive 

so he was transferred to intensive care unit.





Today,  I am seeing the patient during dialysis in the intensive care unit.  He 

was extubated yesterday and is hypotensive on 2 pressors including Levophed and 

vasopressin.Patient is rather sleepy and lethargic but easily arousable and 

denies any new complaints.  Labs and medications were reviewed and dialysis 

orders were reviewed with the treating dialysis nurse.  





Physical Exam


Vital Signs: 


                                        











Temp Pulse Resp BP Pulse Ox


 


 97.2 F   105 H  19   151/77 H  97 


 


 11/11/20 09:02  11/11/20 10:00  11/11/20 10:00  11/11/20 10:00  11/11/20 10:00








                                 Intake & Output











 11/10/20 11/11/20 11/12/20





 06:59 06:59 06:59


 


Intake Total 5876 528 0235


 


Output Total 5 892 1345


 


Balance 1875 173 -345


 


Weight 122.8 kg 124.5 kg 











General appearance: PRESENT: no acute distress


Respiratory exam: PRESENT: clear to auscultation tasha, decreased breath sounds.  

ABSENT: crackles


Cardiovascular exam: PRESENT: +S1, +S2


Extremities exam: PRESENT: pedal edema


Neurological exam: PRESENT: altered


Skin exam: ABSENT: erythema, mottled, rash





Results


Laboratory Results: 


                                        





                                 11/11/20 04:30 





                                 11/11/20 04:30 





                                        











  11/10/20 11/10/20 11/11/20





  22:47 22:47 04:30


 


WBC   6.6 


 


RBC   2.60 L 


 


Hgb   8.8 L 


 


Hct   25.4 L 


 


MCV   97 


 


MCH   34.0 H 


 


MCHC   34.9 


 


RDW   15.9 H 


 


Plt Count   252 


 


Seg Neutrophils %   Not Reportable 


 


Sodium  135.1 L   135.4 L


 


Potassium  4.4  D   4.3


 


Chloride  92 L   93 L


 


Carbon Dioxide  28   24


 


Anion Gap  15   18


 


BUN  43 H D   46 H


 


Creatinine  6.77 H   7.16 H


 


Est GFR ( Amer)  10 L   9 L


 


Glucose  118 H   105


 


Calcium  8.9   8.2 L


 


Total Bilirubin  0.6  


 


AST  78 H  


 


Alkaline Phosphatase  60  


 


Total Protein  6.8  


 


Albumin  3.5  














  11/11/20





  04:30


 


WBC  6.7


 


RBC  2.46 L


 


Hgb  8.3 L


 


Hct  24.1 L


 


MCV  98 H


 


MCH  33.7 H


 


MCHC  34.5


 


RDW  16.1 H


 


Plt Count  240


 


Seg Neutrophils %  Not Reportable


 


Sodium 


 


Potassium 


 


Chloride 


 


Carbon Dioxide 


 


Anion Gap 


 


BUN 


 


Creatinine 


 


Est GFR (African Amer) 


 


Glucose 


 


Calcium 


 


Total Bilirubin 


 


AST 


 


Alkaline Phosphatase 


 


Total Protein 


 


Albumin 











Impressions: 


                                        





Abdomen/Pelvis CT  11/08/20 10:23


IMPRESSION:  Small bowel infarction secondary to incarcerated hernia at the 

umbilicus and proximal dilatation of small bowel loops.


Gas in the SMV and portal venous system in the liver.


 








Guidance Fluoroscopy  11/09/20 00:00


IMPRESSION:  IMAGE(S) OBTAINED DURING PROCEDURE.


 








Chest X-Ray  11/10/20 00:00


IMPRESSION:


 


1. Stable appearance of the chest.


 














Assessment & Plan





- Diagnosis


(1) Hypotension


Is this a current diagnosis for this admission?: Yes   


Plan: 


Being managed by intensivist.  Currently on pressor agents.  Hemodynamically 

stable.








(2) Incarcerated umbilical hernia


Is this a current diagnosis for this admission?: Yes   


Plan: 


Status post surgery with no untoward complications.  Being managed by surgery.








(3) End stage renal disease


Is this a current diagnosis for this admission?: Yes   


Plan: 


Patient initially on peritoneal dialysis but was converted to back up 

hemodialysis through a permacath for now given his recent abdominal surgery.  PD

catheter left behind for later usage.  Patient currently being dialyzed.  Plan 

no fluid removal/minimal.  Dialysis orders were reviewed with the treating 

dialysis nurse.








(4) Hyperkalemia


Is this a current diagnosis for this admission?: Yes   


Plan: 


Currently normokalemic.  Monitor.








(5) Polycystic kidney disease


Plan: 


Status quo.








(6) Anemia in chronic kidney disease (CKD)


Is this a current diagnosis for this admission?: Yes   


Plan: 


Adjust erythropoietin.  Monitor.

## 2020-11-11 NOTE — PDOC PROGRESS REPORT
Subjective


Date:: 11/11/20


Subjective:: 





Patient is sleepy while he is undergoing hemodialysis, arousable


Reason For Visit: 


HYPOXIC RESPIRATORY FAILURE, NEEDS DIALYSIS,








Physical Exam


Vital Signs: 


                                        











Temp Pulse Resp BP Pulse Ox


 


 98.5 F   102 H  27 H  172/134 H  96 


 


 11/11/20 03:42  11/11/20 07:54  11/10/20 18:00  11/10/20 18:00  11/10/20 18:00








                                 Intake & Output











 11/10/20 11/11/20 11/12/20





 06:59 06:59 06:59


 


Intake Total 1880 528 


 


Output Total 5 555 


 


Balance 1875 -27 


 


Weight 122.8 kg 124.5 kg 











General appearance: PRESENT: no acute distress, obese


Respiratory exam: PRESENT: clear to auscultation tasha


Cardiovascular exam: PRESENT: RRR


GI/Abdominal exam: PRESENT: hypoactive bowel sounds, soft, other - Incision 

clean, dry, and intact; right abdominal Emilio drain = filled with serous fluid





Results


Laboratory Results: 


                                        





                                 11/11/20 04:30 





                                 11/11/20 04:30 





                                        











  11/10/20 11/10/20 11/10/20





  08:17 22:47 22:47


 


WBC  4.9   6.6


 


RBC  2.37 L   2.60 L


 


Hgb  8.0 L   8.8 L


 


Hct  23.2 L   25.4 L


 


MCV  98 H   97


 


MCH  33.8 H   34.0 H


 


MCHC  34.6   34.9


 


RDW  15.8 H   15.9 H


 


Plt Count  228   252


 


Seg Neutrophils %  Not Reportable   Not Reportable


 


Sodium   135.1 L 


 


Potassium   4.4  D 


 


Chloride   92 L 


 


Carbon Dioxide   28 


 


Anion Gap   15 


 


BUN   43 H D 


 


Creatinine   6.77 H 


 


Est GFR ( Amer)   10 L 


 


Glucose   118 H 


 


Calcium   8.9 


 


Total Bilirubin   0.6 


 


AST   78 H 


 


Alkaline Phosphatase   60 


 


Total Protein   6.8 


 


Albumin   3.5 














  11/11/20 11/11/20





  04:30 04:30


 


WBC   6.7


 


RBC   2.46 L


 


Hgb   8.3 L


 


Hct   24.1 L


 


MCV   98 H


 


MCH   33.7 H


 


MCHC   34.5


 


RDW   16.1 H


 


Plt Count   240


 


Seg Neutrophils %   Not Reportable


 


Sodium  135.4 L 


 


Potassium  4.3 


 


Chloride  93 L 


 


Carbon Dioxide  24 


 


Anion Gap  18 


 


BUN  46 H 


 


Creatinine  7.16 H 


 


Est GFR (African Amer)  9 L 


 


Glucose  105 


 


Calcium  8.2 L 


 


Total Bilirubin  


 


AST  


 


Alkaline Phosphatase  


 


Total Protein  


 


Albumin  











Impressions: 


                                        





Abdomen/Pelvis CT  11/08/20 10:23


IMPRESSION:  Small bowel infarction secondary to incarcerated hernia at the 

umbilicus and proximal dilatation of small bowel loops.


Gas in the SMV and portal venous system in the liver.


 








Guidance Fluoroscopy  11/09/20 00:00


IMPRESSION:  IMAGE(S) OBTAINED DURING PROCEDURE.


 








Chest X-Ray  11/10/20 00:00


IMPRESSION:


 


1. Stable appearance of the chest.


 














Assessment & Plan





- Diagnosis


(1) Strangulated umbilical hernia


Is this a current diagnosis for this admission?: Yes   





(2) ESRD on peritoneal dialysis


Is this a current diagnosis for this admission?: Yes   





(3) Small bowel obstruction with strangulation or infarction


Is this a current diagnosis for this admission?: Yes   





(4) Hypertension


Qualifiers: 


 


Is this a current diagnosis for this admission?: Yes   





- Time


Anticipated Discharge Disposition: Home with Home Health


Anticipated Discharge Timeframe: When medically stable





- Plan Summary


Plan Summary: 





Assessment:





Postoperative day #3 following repair of strangulated umbilical hernia 

absorbable mesh


Vital signs stable patient afebrile


Blood work within normal limits


Abdominal physical exam unremarkable


Scant output from the Emilio abdominal drain


Yesterday, post extubation the patient failed a swallowing study evaluation








Plan:








Repeat bedside swallowing evaluation


If successful, I will start patient on a clear liquid diet and advance as 

tolerated

## 2020-11-11 NOTE — PDOC CRITICAL CARE PROG REPORT
General


Date:: 11/11/20


ICU Day:: 2


Hospital Day:: 2


Resuscitation Status: Full Code


Events in the past 12 to 24 Hours:: 





11/11/20: No acute events over the past 24 hours.  Patient has been weaned off 

of all vasoactive medications and is receiving dialysis today.


Review of systems relevant to events:: 





ICU day: 2





Neuro: Patient is awake and alert no neurologic deficits.  Pain is well 

controlled with Dilaudid 1 mg IV every 3 hours.  We will discontinue Ativan.


Pulmonary: Breathing comfortably.  SPO2 remains greater than 96%.  Incentive 

spirometry initiated.  Patient was unable to get out of bed today however he was

placed in upright position to avoid atelectasis and subsequent pneumonia given 

his recent surgery.


Cardiovascular: Dynamically stable weaned off of all vasoactive medications.


Heme: H/H within normal limits.  Platelets normal.  No signs of bleeding.


      DVT prophylaxis: Patient currently on heparin 5000 units subcu every 8h.


Renal: BUN/creatinine remain significantly elevated.  CR today: 7.16.  

Hemodialysis initiated.  Repeat renal panel in a.m.


Gastrointestinal: Status post strangulated inguinal hernia repair on 11/8/2020. 

He is recovering well.  30 mL of serosanguineous fluid out of CHAR drain over the 

past 2 days.  No signs of infection or bleeding from surgical site.  Moderate 

pain controlled with Dilaudid.  Incentive spirometer initiated.


Plan: 


      GI prophylaxis: Continue Pepcid 10 mg IV at bedtime.


      Diet: Repeat swallow study and start renal diet as indicated.


ID: No current sign of infection.  Continue prophylactic Flagyl and Cipro.  We 

will speak to surgery in a.m. for recommendations to discontinue.





Barriers to discharge from ICU: Patient is appropriate for transfer to floor if 

HD is available.





Reason for ICU Addmission:: Intubated post-op and needing HD





- Medications:


Medications reviewed and adjusted accordingly: Yes





Physical Exam


Vital Signs: 


                                        











Temp Pulse Resp BP Pulse Ox


 


 98.9 F   104 H  18   137/76 H  96 


 


 11/11/20 12:00  11/11/20 14:00  11/11/20 14:00  11/11/20 14:00  11/11/20 14:00








                                 Intake & Output











 11/10/20 11/11/20 11/12/20





 06:59 06:59 06:59


 


Intake Total 5226 989 6687


 


Output Total 5 555 1345


 


Balance 1875 273 -45


 


Weight 122.8 kg 124.5 kg 








                                  Weight/Height





Weight                           124.5 kg


Height                           6 ft 2 in








General appearance: PRESENT: no acute distress


Head exam: PRESENT: atraumatic


Eye exam: PRESENT: EOMI, PERRLA


Ear exam: PRESENT: normal external ear exam


Mouth exam: PRESENT: moist


Neck exam: PRESENT: full ROM.  ABSENT: JVD, tenderness


Respiratory exam: PRESENT: clear to auscultation tasha, symmetrical, unlabored.  

ABSENT: accessory muscle use, rales, rhonchi, wheezes


Cardiovascular exam: PRESENT: RRR, +S1, +S2


Pulses: PRESENT: +2 pedal pulses bilateral


Vascular exam: PRESENT: normal capillary refill


GI/Abdominal exam: PRESENT: normal bowel sounds, soft, tenderness


Extremities exam: PRESENT: full ROM.  ABSENT: pedal edema


Musculoskeletal exam: PRESENT: full ROM


Neurological exam: PRESENT: alert, altered, awake, oriented to person, oriented 

to place, oriented to time, oriented to situation, CN II-XII grossly intact


Psychiatric exam: PRESENT: appropriate affect


Skin exam: PRESENT: normal color, warm





Laboratory/Radiographs


Laboratory Results: 


                                        





                                 11/11/20 04:30 





                                 11/11/20 04:30 





                                        











  11/10/20 11/10/20 11/11/20





  22:47 22:47 04:30


 


WBC   6.6 


 


RBC   2.60 L 


 


Hgb   8.8 L 


 


Hct   25.4 L 


 


MCV   97 


 


MCH   34.0 H 


 


MCHC   34.9 


 


RDW   15.9 H 


 


Plt Count   252 


 


Seg Neutrophils %   Not Reportable 


 


Sodium  135.1 L   135.4 L


 


Potassium  4.4  D   4.3


 


Chloride  92 L   93 L


 


Carbon Dioxide  28   24


 


Anion Gap  15   18


 


BUN  43 H D   46 H


 


Creatinine  6.77 H   7.16 H


 


Est GFR ( Amer)  10 L   9 L


 


Glucose  118 H   105


 


Calcium  8.9   8.2 L


 


Total Bilirubin  0.6  


 


AST  78 H  


 


Alkaline Phosphatase  60  


 


Total Protein  6.8  


 


Albumin  3.5  














  11/11/20





  04:30


 


WBC  6.7


 


RBC  2.46 L


 


Hgb  8.3 L


 


Hct  24.1 L


 


MCV  98 H


 


MCH  33.7 H


 


MCHC  34.5


 


RDW  16.1 H


 


Plt Count  240


 


Seg Neutrophils %  Not Reportable


 


Sodium 


 


Potassium 


 


Chloride 


 


Carbon Dioxide 


 


Anion Gap 


 


BUN 


 


Creatinine 


 


Est GFR (African Amer) 


 


Glucose 


 


Calcium 


 


Total Bilirubin 


 


AST 


 


Alkaline Phosphatase 


 


Total Protein 


 


Albumin 











Impressions: 


                                        





Abdomen/Pelvis CT  11/08/20 10:23


IMPRESSION:  Small bowel infarction secondary to incarcerated hernia at the 

umbilicus and proximal dilatation of small bowel loops.


Gas in the SMV and portal venous system in the liver.


 








Guidance Fluoroscopy  11/09/20 00:00


IMPRESSION:  IMAGE(S) OBTAINED DURING PROCEDURE.


 








Chest X-Ray  11/10/20 00:00


IMPRESSION:


 


1. Stable appearance of the chest.


 











All labs, radiographs, diagnostic studies and EKGs were personally reviewed: Yes


In addition, reports of radiographic and diagnostic studies were read: Yes





Assessment and Plan





- Diagnosis


(1) Small bowel obstruction with strangulation or infarction


Is this a current diagnosis for this admission?: Yes   


Plan: 


Status post hernia repair.


Patient is recovering well.  


Out of bed to chair as tolerated


Continue incentive spirometry


Can start p.o. feeds if he tolerates a swallow study.








(2) End stage renal disease


Is this a current diagnosis for this admission?: Yes   


Plan: 


HD today.


Follow-up renal panel in a.m.








Critical Time


Critical Time (minutes): 36


Level of Care: ICU


Anticipated discharge: Home


Anticipated DC Timeframe: within 72 hours


-: 


1.  The care of a critical patient is a dynamic process.  This note is a 

representative synopsis but static in nature.  The timeframe for treatments g

iven in order is not necessarily the actual time these treatments may have been 

done.





2.  This patient requires critical care secondary to ongoing requirements for 

therapy not offered or safe outside the critical care environment.  Transfer to 

a lower level of care will result in altered life or limb morbidity and 

mortality.





3.  Multidisciplinary rounds completed.





4.  ABCDE bundle addressed.

## 2020-11-12 NOTE — PDOC CRITICAL CARE PROG REPORT
-- Message is from the [facility]--    Order Request  Lab: BLOOD WORK     Message / reason: Patient needs to do blood work prior to seeing Dr. Cipriano Moore                Preferred Delivery Method   Call when ready for pickup - phone number to notify: [phone]     Caller Information       Type Contact Phone    03/26/2019 09:24 AM Phone (Incoming) HYUN FOFANA (Emergency Contact) [phone] (M)          Alternative phone number: [phone]    Turnaround time given to caller:   \"This message will be sent to [state Provider's name]. The clinical team will fulfill your request as soon as they review your message.\"   General


Date:: 11/12/20


ICU Day:: 3


Hospital Day:: 3


Resuscitation Status: Full Code


Events in the past 12 to 24 Hours:: 





11/11/20: No acute events over the past 24 hours.  Patient has been weaned off 

of all vasoactive medications and is receiving dialysis today.





11/12/20: Patient had an episode of vomiting this morning.  Zofran did not help 

his nausea.  NG tube placed and several liters of fluid was removed.  Patient 

states that he is feeling better and less nauseous however, he continues to feel

weak.


Review of systems relevant to events:: 





ICU day: 3





Neuro: Patient is awake and alert no neurologic deficits.  Pain is well 

controlled with Dilaudid 1 mg IV every 3 hours. 


Pulmonary: Breathing comfortably.  SPO2 remains greater than 96%.  Incentive 

spirometry initiated.  Patient was unable to get out of bed today however he was

placed in upright position to avoid atelectasis and subsequent pneumonia given 

his recent surgery.


Cardiovascular: Dynamically stable weaned off of all vasoactive medications.  Hair carrizales complained of dizziness when sitting upright however he had no orthostatic

hypotension.


Heme: H/H within normal limits.  Platelets normal.  No signs of bleeding.


      DVT prophylaxis: Patient currently on heparin 5000 units subcu every 8h.


Renal: BUN/creatinine remain significantly elevated.  CR today: 4.09  

Hemodialysis yesterday with some improvement in Cr.  Repeat renal panel in a.m.


Gastrointestinal: Status post strangulated inguinal hernia repair on 11/8/2020. 

He is recovering well.   No signs of infection or bleeding from surgical site.  

Moderate pain controlled with Dilaudid.  Nasogastric tube placed today due to 

vomiting.  It seems to have provided some relief.  Multiple liters of bilious 

drainage.`


Plan: 


      GI prophylaxis: Continue Pepcid 10 mg IV at bedtime.


      Diet: We will keep NPO for now given his Nausea and vomiting. 


ID: No current sign of infection.  Continue prophylactic Flagyl and Cipro.  We 

will speak to surgery in a.m. for recommendations to discontinue.





Barriers to discharge from ICU: Patient is appropriate for transfer to floor if 

HD is available.





Reason for ICU Addmission:: Intubated post-op and needing HD





- Medications:


Medications reviewed and adjusted accordingly: Yes





Physical Exam


Vital Signs: 


                                        











Temp Pulse Resp BP Pulse Ox


 


 98.3 F   110 H  28 H  136/61 H  94 


 


 11/12/20 10:00  11/12/20 10:00  11/12/20 10:39  11/12/20 10:39  11/12/20 10:39








                                 Intake & Output











 11/11/20 11/12/20 11/13/20





 06:59 06:59 06:59


 


Intake Total 828 1620 100


 


Output Total 555 1400 5


 


Balance 273 220 95


 


Weight 124.5 kg 124.1 kg 








                                  Weight/Height





Weight                           124.1 kg


Height                           6 ft 2 in








General appearance: PRESENT: mild distress, well-developed


Head exam: PRESENT: atraumatic, normocephalic


Eye exam: PRESENT: EOMI, PERRLA


Ear exam: PRESENT: normal external ear exam


Neck exam: PRESENT: full ROM.  ABSENT: carotid bruit, JVD, lymphadenopathy


Respiratory exam: PRESENT: clear to auscultation tasha.  ABSENT: accessory muscle 

use, rhonchi, wheezes


Cardiovascular exam: PRESENT: RRR, +S1, +S2


Pulses: PRESENT: normal carotid pulses, +2 pedal pulses bilateral


Vascular exam: PRESENT: normal capillary refill


GI/Abdominal exam: PRESENT: hyperactive bowel sounds, soft, tenderness


Musculoskeletal exam: PRESENT: full ROM, normal inspection


Neurological exam: PRESENT: alert, altered, awake, oriented to person, oriented 

to place, oriented to time, oriented to situation, CN II-XII grossly intact


Psychiatric exam: PRESENT: appropriate affect


Skin exam: PRESENT: intact, normal color.  ABSENT: skin tears


Tubes/Lines: PRESENT: Nasogastic Tube





Laboratory/Radiographs


Laboratory Results: 


                                        





                                 11/12/20 03:30 





                                 11/12/20 03:30 





                                        











  11/12/20 11/12/20





  03:30 03:30


 


WBC   7.3


 


RBC   2.49 L


 


Hgb   8.4 L


 


Hct   24.4 L


 


MCV   98 H


 


MCH   33.5 H


 


MCHC   34.3


 


RDW   15.6 H


 


Plt Count   239


 


Seg Neutrophils %   Not Reportable


 


Sodium  135.4 L 


 


Potassium  3.9 


 


Chloride  92 L 


 


Carbon Dioxide  25 


 


Anion Gap  18 


 


BUN  43 H 


 


Creatinine  6.07 H 


 


Est GFR (African Amer)  11 L 


 


Glucose  85 


 


Calcium  8.4 











Impressions: 


                                        





Abdomen/Pelvis CT  11/08/20 10:23


IMPRESSION:  Small bowel infarction secondary to incarcerated hernia at the 

umbilicus and proximal dilatation of small bowel loops.


Gas in the SMV and portal venous system in the liver.


 








Guidance Fluoroscopy  11/09/20 00:00


IMPRESSION:  IMAGE(S) OBTAINED DURING PROCEDURE.


 








Chest X-Ray  11/10/20 00:00


IMPRESSION:


 


1. Stable appearance of the chest.


 











All labs, radiographs, diagnostic studies and EKGs were personally reviewed: Yes


In addition, reports of radiographic and diagnostic studies were read: Yes





Assessment and Plan





- Diagnosis


(1) Small bowel obstruction with strangulation or infarction


Is this a current diagnosis for this admission?: Yes   


Plan: 


Status post hernia repair.


Patient is recovering well.  


Out of bed to chair as tolerated


Continue incentive spirometry


Will keep NPO for now given his nausea and vomiting. 








(2) End stage renal disease


Is this a current diagnosis for this admission?: Yes   


Plan: 


HD again tomorrow. 


Follow-up renal panel in a.m.





Plan Summary: 





Patient is recovering well. We will await further instructions from surgery, 

however, from a critical care standpoint, patient is appropriate to transfer to 

a medical floor.





Critical Time


Critical Time (minutes): 62


Level of Care: ICU


Anticipated discharge: Home


Anticipated DC Timeframe: within 36 hours


-: 


1.  The care of a critical patient is a dynamic process.  This note is a 

representative synopsis but static in nature.  The timeframe for treatments 

given in order is not necessarily the actual time these treatments may have been

done.





2.  This patient requires critical care secondary to ongoing requirements for 

therapy not offered or safe outside the critical care environment.  Transfer to 

a lower level of care will result in altered life or limb morbidity and 

mortality.





3.  Multidisciplinary rounds completed.





4.  ABCDE bundle addressed.

## 2020-11-12 NOTE — PDOC PROGRESS REPORT
Subjective


Date:: 11/12/20


Reason For Visit: 


HYPOXIC RESPIRATORY FAILURE, NEEDS DIALYSIS,








Physical Exam


Vital Signs: 


                                        











Temp Pulse Resp BP Pulse Ox


 


 98.3 F   110 H  21 H  153/78 H  97 


 


 11/12/20 17:00  11/12/20 19:32  11/12/20 19:32  11/12/20 19:32  11/12/20 19:32








                                 Intake & Output











 11/11/20 11/12/20 11/13/20





 06:59 06:59 06:59


 


Intake Total 828 1620 500


 


Output Total 555 1400 2325


 


Balance 273 220 -1825


 


Weight 124.5 kg 124.1 kg 124.1 kg














Results


Laboratory Results: 


                                        





                                 11/12/20 03:30 





                                 11/12/20 03:30 





                                        











  11/12/20 11/12/20





  03:30 03:30


 


WBC   7.3


 


RBC   2.49 L


 


Hgb   8.4 L


 


Hct   24.4 L


 


MCV   98 H


 


MCH   33.5 H


 


MCHC   34.3


 


RDW   15.6 H


 


Plt Count   239


 


Seg Neutrophils %   Not Reportable


 


Sodium  135.4 L 


 


Potassium  3.9 


 


Chloride  92 L 


 


Carbon Dioxide  25 


 


Anion Gap  18 


 


BUN  43 H 


 


Creatinine  6.07 H 


 


Est GFR (African Amer)  11 L 


 


Glucose  85 


 


Calcium  8.4 











Impressions: 


                                        





Abdomen/Pelvis CT  11/08/20 10:23


IMPRESSION:  Small bowel infarction secondary to incarcerated hernia at the 

umbilicus and proximal dilatation of small bowel loops.


Gas in the SMV and portal venous system in the liver.


 








Guidance Fluoroscopy  11/09/20 00:00


IMPRESSION:  IMAGE(S) OBTAINED DURING PROCEDURE.


 








Chest X-Ray  11/10/20 00:00


IMPRESSION:


 


1. Stable appearance of the chest.


 














Assessment & Plan





- Diagnosis


(1) Small bowel obstruction with strangulation or infarction


Is this a current diagnosis for this admission?: Yes   





- Time


Anticipated Discharge Disposition: Home, Self Care


Anticipated Discharge Timeframe: unknown





- Plan Summary


Plan Summary: 





70-year-old male status post repair of a strangulated ventral hernia.  The 

patient has no complaints today.  Continue with a clear liquid diet.  Out of 

bed.  Okay to transfer to floor, when okay with medical team.   Surgery will 

follow.





Ambulate.





Aggressive pulmonary toilet.

## 2020-11-13 NOTE — PDOC PROGRESS REPORT
Subjective


Date:: 11/13/20


Reason For Visit: 


Patient seen in the ICU today on dialysis.  He is undergoing dialysis without 

any issues.  However patient was sleeping and easily arousable says he is not 

feeling so well.  Still has some mild abdominal pains and also feels bloated.  

He is not been passing any flatus for the last many hours.  At times has 

intermittent nausea.  Labs and medications were reviewed.  Dialysis orders were 

reviewed with the treating dialysis nurse.  He had an NG tube yesterday which 

apparently aspirated about thousand five hundred cc of bilious fluid as per 

treating RN and then has been discontinued.





Physical Exam


Vital Signs: 


                                        











Temp Pulse Resp BP Pulse Ox


 


 97.9 F   103 H  18   133/85 H  97 


 


 11/13/20 08:00  11/13/20 10:00  11/13/20 10:00  11/13/20 10:00  11/13/20 10:00








                                 Intake & Output











 11/12/20 11/13/20 11/14/20





 06:59 06:59 06:59


 


Intake Total 1620 700 


 


Output Total 1400 2345 653


 


Balance 220 -1645 -653


 


Weight 124.1 kg 121.2 kg 











General appearance: PRESENT: no acute distress, disheveled


Respiratory exam: PRESENT: clear to auscultation tasha, decreased breath sounds.  

ABSENT: crackles


Cardiovascular exam: PRESENT: +S1, +S2


GI/Abdominal exam: PRESENT: distended, tenderness.  ABSENT: guarding, normal 

bowel sounds, organomegaly


Neurological exam: PRESENT: alert, awake, oriented to person, oriented to place


Psychiatric exam: PRESENT: depressed





Results


Laboratory Results: 


                                        





                                 11/13/20 03:45 





                                 11/13/20 03:45 





                                        











  11/13/20 11/13/20





  03:45 03:45


 


WBC   9.0


 


RBC   2.46 L


 


Hgb   8.0 L


 


Hct   23.9 L


 


MCV   97


 


MCH   32.7


 


MCHC   33.7


 


RDW   15.3 H


 


Plt Count   237


 


Seg Neutrophils %   Not Reportable


 


Sodium  136.0 L 


 


Potassium  4.0 


 


Chloride  91 L 


 


Carbon Dioxide  23 


 


Anion Gap  22 H 


 


BUN  67 H 


 


Creatinine  7.37 H 


 


Est GFR (African Amer)  9 L 


 


Glucose  105 


 


Calcium  8.3 L 








                                        





11/08/20 08:55   Blood   Blood Culture - Final


                            NO GROWTH IN 5 DAYS








Impressions: 


                                        





Abdomen/Pelvis CT  11/08/20 10:23


IMPRESSION:  Small bowel infarction secondary to incarcerated hernia at the 

umbilicus and proximal dilatation of small bowel loops.


Gas in the SMV and portal venous system in the liver.


 








Guidance Fluoroscopy  11/09/20 00:00


IMPRESSION:  IMAGE(S) OBTAINED DURING PROCEDURE.


 








Chest X-Ray  11/10/20 00:00


IMPRESSION:


 


1. Stable appearance of the chest.


 














Assessment & Plan





- Diagnosis


(1) End stage renal disease


Is this a current diagnosis for this admission?: Yes   


Plan: 


Patient initially on peritoneal dialysis but was converted to back up 

hemodialysis through a permacath for now given his recent abdominal surgery.  PD

catheter left behind for later usage.  Patient currently being dialyzed.  Plan 

500-1 l  fluid removal/minimal.  Dialysis orders were reviewed with the treating

dialysis nurse.








(2) Hypotension


Is this a current diagnosis for this admission?: Yes   


Plan: 


Being managed by intensivist.  Currently off pressor agents.  Hemodynamically 

stable.








(3) Incarcerated umbilical hernia


Is this a current diagnosis for this admission?: Yes   


Plan: 


Status post surgery with no untoward complications.  Being managed by 

surgery.Currently however his abdomen looks rather soft-firm with distention and

altered bowel sounds.?  Ileus. Potassium was 4.0. Discussed with treating nurse.








(4) Polycystic kidney disease


Plan: 


Status quo.








(5) Anemia in chronic kidney disease (CKD)


Is this a current diagnosis for this admission?: Yes   


Plan: 


Adjust erythropoietin.  Monitor.

## 2020-11-13 NOTE — PDOC CRITICAL CARE PROG REPORT
General


Date:: 11/13/20


ICU Day:: 4


Hospital Day:: 4


Resuscitation Status: Full Code


Events in the past 12 to 24 Hours:: 





11/11/20: No acute events over the past 24 hours.  Patient has been weaned off 

of all vasoactive medications and is receiving dialysis today.





11/12/20: Patient had an episode of vomiting this morning.  Zofran did not help 

his nausea.  NG tube placed and several liters of fluid was removed.  Patient 

states that he is feeling better and less nauseous however, he continues to feel

weak.





11/13/2020: Patient's nausea and vomiting have subsided today.  He has not 

required replacement of NG tube.  Patient is appropriate for transfer to 

hospitalist care.


Review of systems relevant to events:: 





ICU day: 4





Neuro: Patient is awake and alert no neurologic deficits.  Pain is well 

controlled with Dilaudid 1 mg IV every 3 hours. 


Pulmonary: Breathing comfortably.  SPO2 remains greater than 96%.  Incentive 

spirometry initiated and encouraged.  Patient still has difficulty at times with

dizziness when sitting at the side of the bed.  We have therefore not gotten him

out of bed today.


Cardiovascular: Dynamically stable weaned off of all vasoactive medications.  

Patient complained of dizziness when sitting upright however he had no 

orthostatic hypotension.


Heme: H/H within normal limits.  Platelets normal.  No signs of bleeding.


      DVT prophylaxis: Patient currently on heparin 5000 units subcu every 8h.


Renal: BUN/creatinine remain significantly elevated.  CR today: 7.37. 

Hemodialysis today.  Repeat renal panel in a.m.


Gastrointestinal: Status post strangulated inguinal hernia repair on 11/8/2020. 

He is recovering well.   No signs of infection or bleeding from surgical site.  

Moderate pain controlled with Dilaudid.  No vomiting today.


Plan: 


      GI prophylaxis: Continue Pepcid 10 mg IV at bedtime.


      Diet: We will follow surgical recommendations for p.o. feeding.


ID: No current sign of infection.  Continue prophylactic Flagyl and Cipro.  





Barriers to discharge from ICU: Patient appropriate for transfer out of ICU.  

Report given to hospitalist.





Reason for ICU Addmission:: Intubated post-op and needing HD





- Medications:


Medications reviewed and adjusted accordingly: Yes





Physical Exam


Vital Signs: 


                                        











Temp Pulse Resp BP Pulse Ox


 


 97.9 F   103 H  20   149/80 H  96 


 


 11/13/20 10:00  11/13/20 10:00  11/13/20 11:35  11/13/20 14:05  11/13/20 14:05








                                 Intake & Output











 11/12/20 11/13/20 11/14/20





 06:59 06:59 06:59


 


Intake Total 1620 700 350


 


Output Total 1400 3249 653


 


Balance 220 -5479 -303


 


Weight 124.1 kg 121.2 kg 








                                  Weight/Height





Weight                           121.2 kg


Height                           6 ft 1 in








General appearance: PRESENT: no acute distress


Head exam: PRESENT: atraumatic, normocephalic


Eye exam: PRESENT: EOMI, PERRLA


Ear exam: PRESENT: normal external ear exam


Mouth exam: PRESENT: moist


Neck exam: PRESENT: full ROM.  ABSENT: JVD, lymphadenopathy, tenderness


Respiratory exam: PRESENT: clear to auscultation tasha, symmetrical, unlabored.  

ABSENT: accessory muscle use, rhonchi, tachypnea, wheezes


Cardiovascular exam: PRESENT: RRR


Pulses: PRESENT: normal radial pulses, normal femoral pulses, +1 pedal pulses 

bilateral


Vascular exam: PRESENT: normal capillary refill


GI/Abdominal exam: PRESENT: normal bowel sounds, soft


Extremities exam: PRESENT: full ROM, +1 edema


Musculoskeletal exam: PRESENT: full ROM, normal inspection


Neurological exam: PRESENT: alert, altered, awake, oriented to person, oriented 

to place, oriented to time, oriented to situation, CN II-XII grossly intact


Psychiatric exam: PRESENT: appropriate affect, normal mood





Laboratory/Radiographs


Laboratory Results: 


                                        





                                 11/13/20 03:45 





                                 11/13/20 03:45 





                                        











  11/13/20 11/13/20





  03:45 03:45


 


WBC   9.0


 


RBC   2.46 L


 


Hgb   8.0 L


 


Hct   23.9 L


 


MCV   97


 


MCH   32.7


 


MCHC   33.7


 


RDW   15.3 H


 


Plt Count   237


 


Seg Neutrophils %   Not Reportable


 


Sodium  136.0 L 


 


Potassium  4.0 


 


Chloride  91 L 


 


Carbon Dioxide  23 


 


Anion Gap  22 H 


 


BUN  67 H 


 


Creatinine  7.37 H 


 


Est GFR (African Amer)  9 L 


 


Glucose  105 


 


Calcium  8.3 L 








                                        





11/08/20 13:02   Blood   Blood Culture - Final


                            NO GROWTH IN 5 DAYS


11/08/20 08:55   Blood   Blood Culture - Final


                            NO GROWTH IN 5 DAYS








Impressions: 


                                        





Abdomen/Pelvis CT  11/08/20 10:23


IMPRESSION:  Small bowel infarction secondary to incarcerated hernia at the 

umbilicus and proximal dilatation of small bowel loops.


Gas in the SMV and portal venous system in the liver.


 








Guidance Fluoroscopy  11/09/20 00:00


IMPRESSION:  IMAGE(S) OBTAINED DURING PROCEDURE.


 








Chest X-Ray  11/10/20 00:00


IMPRESSION:


 


1. Stable appearance of the chest.


 











All labs, radiographs, diagnostic studies and EKGs were personally reviewed: Yes


In addition, reports of radiographic and diagnostic studies were read: Yes





Assessment and Plan





- Diagnosis


(1) Small bowel obstruction with strangulation or infarction


Is this a current diagnosis for this admission?: Yes   


Plan: 


Status post hernia repair.


Patient is recovering well.  


Out of bed to chair as tolerated


Continue incentive spirometry


Follow surgical recommendations for feeding.








(2) End stage renal disease


Is this a current diagnosis for this admission?: Yes   


Plan: 


Chronic renal failure.  


Converted from peritoneal dialysis in the past to hemodialysis.  


HD today.


Follow-up renal panel in a.m.








Critical Time


Critical Time (minutes): 36


Level of Care: ICU


Anticipated discharge: Home


Anticipated DC Timeframe: within 36 hours


-: 


1.  The care of a critical patient is a dynamic process.  This note is a 

representative synopsis but static in nature.  The timeframe for treatments 

given in order is not necessarily the actual time these treatments may have been

done.





2.  This patient requires critical care secondary to ongoing requirements for 

therapy not offered or safe outside the critical care environment.  Transfer to 

a lower level of care will result in altered life or limb morbidity and 

mortality.





3.  Multidisciplinary rounds completed.





4.  ABCDE bundle addressed.

## 2020-11-13 NOTE — PDOC PROGRESS REPORT
Subjective


Date:: 11/13/20


Reason For Visit: 


HYPOXIC RESPIRATORY FAILURE, NEEDS DIALYSIS,








Physical Exam


Vital Signs: 


                                        











Temp Pulse Resp BP Pulse Ox


 


 97.9 F   103 H  20   140/90 H  96 


 


 11/13/20 10:00  11/13/20 10:00  11/13/20 11:35  11/13/20 12:05  11/13/20 12:05








                                 Intake & Output











 11/12/20 11/13/20 11/14/20





 06:59 06:59 06:59


 


Intake Total 1620 700 


 


Output Total 1400 2345 653


 


Balance 220 -1645 -653


 


Weight 124.1 kg 121.2 kg 














Results


Laboratory Results: 


                                        





                                 11/13/20 03:45 





                                 11/13/20 03:45 





                                        











  11/13/20 11/13/20





  03:45 03:45


 


WBC   9.0


 


RBC   2.46 L


 


Hgb   8.0 L


 


Hct   23.9 L


 


MCV   97


 


MCH   32.7


 


MCHC   33.7


 


RDW   15.3 H


 


Plt Count   237


 


Seg Neutrophils %   Not Reportable


 


Sodium  136.0 L 


 


Potassium  4.0 


 


Chloride  91 L 


 


Carbon Dioxide  23 


 


Anion Gap  22 H 


 


BUN  67 H 


 


Creatinine  7.37 H 


 


Est GFR (African Amer)  9 L 


 


Glucose  105 


 


Calcium  8.3 L 








                                        





11/08/20 08:55   Blood   Blood Culture - Final


                            NO GROWTH IN 5 DAYS








Impressions: 


                                        





Abdomen/Pelvis CT  11/08/20 10:23


IMPRESSION:  Small bowel infarction secondary to incarcerated hernia at the 

umbilicus and proximal dilatation of small bowel loops.


Gas in the SMV and portal venous system in the liver.


 








Guidance Fluoroscopy  11/09/20 00:00


IMPRESSION:  IMAGE(S) OBTAINED DURING PROCEDURE.


 








Chest X-Ray  11/10/20 00:00


IMPRESSION:


 


1. Stable appearance of the chest.


 














Assessment & Plan





- Diagnosis


(1) Small bowel obstruction with strangulation or infarction


Is this a current diagnosis for this admission?: Yes   





- Time


Anticipated Discharge Disposition: unknown


Anticipated Discharge Timeframe: unknown





- Plan Summary


Plan Summary: 





70-year-old male status post repair of a strangulated ventral hernia.  The 

patient has no complaints today.  He is not yet passed flatus.  Continue with a 

clear liquid diet for now.  Out of bed.  Transfer to floor.  Surgery will 

follow.





Continue Bazan catheter.





Ambulate.





Aggressive pulmonary toilet. negative

## 2020-11-14 NOTE — PDOC PROGRESS REPORT
Subjective


Date:: 11/14/20


Subjective:: 





weak, dizzy when  gets up


Reason For Visit: 


HYPOXIC RESPIRATORY FAILURE, NEEDS DIALYSIS,








Physical Exam


Vital Signs: 


                                        











Temp Pulse Resp BP Pulse Ox


 


 97.5 F   112 H  18   134/66 H  96 


 


 11/14/20 11:43  11/14/20 12:04  11/14/20 11:43  11/14/20 12:04  11/14/20 12:15








                                 Intake & Output











 11/13/20 11/14/20 11/15/20





 06:59 06:59 06:59


 


Intake Total 700 600 350


 


Output Total 2345 1453 700


 


Balance -1645 -853 -350


 


Weight 121.2 kg 121.6 kg 











General appearance: PRESENT: obese


Head exam: PRESENT: normocephalic


Eye exam: PRESENT: EOMI


Mouth exam: PRESENT: moist


Neck exam: PRESENT: full ROM


Respiratory exam: PRESENT: decreased breath sounds, tachypnea


Cardiovascular exam: PRESENT: RRR


Pulses: PRESENT: normal radial pulses, normal femoral pulses


Vascular exam: PRESENT: normal capillary refill


GI/Abdominal exam: PRESENT: soft, other - wound clean


Rectal exam: PRESENT: deferred


Extremities exam: PRESENT: full ROM


Neurological exam: PRESENT: alert, awake, oriented to person, oriented to place


Psychiatric exam: PRESENT: appropriate affect


Skin exam: PRESENT: dry





Results


Laboratory Results: 


                                        





                                 11/14/20 05:35 





                                 11/14/20 05:35 





                                        











  11/14/20 11/14/20





  05:35 05:35


 


WBC  10.2 


 


RBC  2.48 L 


 


Hgb  8.1 L 


 


Hct  24.2 L 


 


MCV  98 H 


 


MCH  32.6 


 


MCHC  33.4 


 


RDW  15.9 H 


 


Plt Count  245 


 


Seg Neutrophils %  Not Reportable 


 


Sodium   138.4


 


Potassium   3.5 L


 


Chloride   100


 


Carbon Dioxide   22


 


Anion Gap   16


 


BUN   48 H


 


Creatinine   5.42 H


 


Est GFR (African Amer)   13 L


 


Glucose   91


 


Calcium   7.5 L








                                        





11/08/20 13:02   Blood   Blood Culture - Final


                            NO GROWTH IN 5 DAYS


11/08/20 08:55   Blood   Blood Culture - Final


                            NO GROWTH IN 5 DAYS








Impressions: 


                                        





Abdomen/Pelvis CT  11/08/20 10:23


IMPRESSION:  Small bowel infarction secondary to incarcerated hernia at the 

umbilicus and proximal dilatation of small bowel loops.


Gas in the SMV and portal venous system in the liver.


 








Guidance Fluoroscopy  11/09/20 00:00


IMPRESSION:  IMAGE(S) OBTAINED DURING PROCEDURE.


 








Chest X-Ray  11/10/20 00:00


IMPRESSION:


 


1. Stable appearance of the chest.


 














Assessment & Plan





- Time


Anticipated Discharge Disposition: unk


Anticipated Discharge Timeframe: unk





- Plan Summary


Plan Summary: 





impression,


s/p ventral hernia repair due to


strangulation


doing ok


still no flatus or stool


has ng in place


getting hemodialysis


recommed, 


increasing activity


cont ng for now

## 2020-11-14 NOTE — PDOC PROGRESS REPORT
Subjective


Date:: 11/14/20


Subjective:: 





Nasogastric tube has been put back in.  Green bilious output at this time.  Delia

ent is complaining of dizziness.   It occurs more with change of position.


Reason For Visit: 


HYPOXIC RESPIRATORY FAILURE, NEEDS DIALYSIS,








Physical Exam


Vital Signs: 


                                        











Temp Pulse Resp BP Pulse Ox


 


 98.9 F   103 H  18   149/71 H  98 


 


 11/14/20 10:00  11/13/20 20:08  11/13/20 20:08  11/13/20 20:08  11/13/20 20:08








                                 Intake & Output











 11/13/20 11/14/20 11/15/20





 06:59 06:59 06:59


 


Intake Total 700 600 300


 


Output Total 2345 1453 700


 


Balance -0945 -133 -400


 


Weight 121.2 kg 121.6 kg 











General appearance: PRESENT: cooperative, mild distress


Head exam: PRESENT: atraumatic, normocephalic


Eye exam: PRESENT: conjunctiva pale.  ABSENT: scleral icterus


Ear exam: PRESENT: normal external ear exam.  ABSENT: bleeding, drainage


Mouth exam: PRESENT: other - Nasogastric tube in place


Respiratory exam: PRESENT: clear to auscultation tasha - Above the abdominal 

binder.  ABSENT: rales, rhonchi, tachypnea, wheezes


Cardiovascular exam: PRESENT: RRR, +S1, +S2.  ABSENT: bradycardia, diastolic 

murmur, irregular rhythm, systolic murmur, tachycardia


GI/Abdominal exam: PRESENT: hypoactive bowel sounds, other - Abdominal binder in

place


Rectal exam: PRESENT: deferred


Neurological exam: PRESENT: alert, awake, oriented to person, oriented to place,

oriented to time, oriented to situation


Psychiatric exam: ABSENT: agitated, anxious





Results


Laboratory Results: 


                                        





                                 11/14/20 05:35 





                                 11/14/20 05:35 





                                        











  11/14/20 11/14/20





  05:35 05:35


 


WBC  10.2 


 


RBC  2.48 L 


 


Hgb  8.1 L 


 


Hct  24.2 L 


 


MCV  98 H 


 


MCH  32.6 


 


MCHC  33.4 


 


RDW  15.9 H 


 


Plt Count  245 


 


Seg Neutrophils %  Not Reportable 


 


Sodium   138.4


 


Potassium   3.5 L


 


Chloride   100


 


Carbon Dioxide   22


 


Anion Gap   16


 


BUN   48 H


 


Creatinine   5.42 H


 


Est GFR (African Amer)   13 L


 


Glucose   91


 


Calcium   7.5 L








                                        





11/08/20 13:02   Blood   Blood Culture - Final


                            NO GROWTH IN 5 DAYS


11/08/20 08:55   Blood   Blood Culture - Final


                            NO GROWTH IN 5 DAYS








Impressions: 


                                        





Abdomen/Pelvis CT  11/08/20 10:23


IMPRESSION:  Small bowel infarction secondary to incarcerated hernia at the 

umbilicus and proximal dilatation of small bowel loops.


Gas in the SMV and portal venous system in the liver.


 








Guidance Fluoroscopy  11/09/20 00:00


IMPRESSION:  IMAGE(S) OBTAINED DURING PROCEDURE.


 








Chest X-Ray  11/10/20 00:00


IMPRESSION:


 


1. Stable appearance of the chest.


 














Assessment and Plan





- Diagnosis


(1) Ileus, postoperative


Is this a current diagnosis for this admission?: Yes   


Plan: 


Postop day 6.  Patient states that nasogastric tube was briefly out but he 

developed nausea and vomiting and it was placed back in. NG tube in place.  

Monitoring output.  Hypoactive bowel sounds today.








(2) Strangulated umbilical hernia


Is this a current diagnosis for this admission?: Yes   


Plan: 


Surgical repair completed.  








(3) ESRD on hemodialysis


Is this a current diagnosis for this admission?: Yes   


Plan: 


Management per nephrology








(4) Anemia in chronic kidney disease (CKD)


Qualifiers: 


   Chronic kidney disease stage: on chronic dialysis   Qualified Code(s): N18.6 

- End stage renal disease; D63.1 - Anemia in chronic kidney disease; Z99.2 - 

Dependence on renal dialysis   


Is this a current diagnosis for this admission?: Yes   


Plan: 


Continue to monitor CBC








(5) Hyperkalemia


Is this a current diagnosis for this admission?: Yes   


Plan: 


Address with dialysis








(6) Hypertension


Qualifiers: 


   Hypertension type: essential hypertension 


Is this a current diagnosis for this admission?: Yes   


Plan: 


Patient complains of significant lightheadedness.  He is not exhibiting 

extremely high blood pressure.  He may be dry intravascularly.  I will give 1 L 

of fluid gently and monitor.  Check orthostatic vitals.








(7) Polycystic kidney disease


Is this a current diagnosis for this admission?: Yes   





- Time


Time Spent with patient: 15-24 minutes


Medications reviewed and adjusted accordingly: Yes


Anticipated Discharge Disposition: Home with Home Health


Anticipated Discharge Timeframe: Unknown

## 2020-11-15 NOTE — PDOC PROGRESS REPORT
Subjective


Date:: 11/15/20


Reason For Visit: 


HYPOXIC RESPIRATORY FAILURE, NEEDS DIALYSIS,








Physical Exam


Vital Signs: 


                                        











Temp Pulse Resp BP Pulse Ox


 


 98.1 F   104 H  18   122/74   96 


 


 11/15/20 03:43  11/15/20 03:43  11/15/20 03:43  11/15/20 03:43  11/15/20 03:43








                                 Intake & Output











 11/14/20 11/15/20 11/16/20





 06:59 06:59 06:59


 


Intake Total 600 2216 


 


Output Total 1453 4100 


 


Balance -853 -1884 


 


Weight 121.6 kg 122.7 kg 











General appearance: PRESENT: mild distress


Head exam: PRESENT: normocephalic


Eye exam: PRESENT: EOMI


Ear exam: PRESENT: normal external ear exam


Mouth exam: PRESENT: moist


Teeth exam: PRESENT: poor dentation


Neck exam: PRESENT: full ROM


Respiratory exam: PRESENT: clear to auscultation tasha


Cardiovascular exam: PRESENT: RRR


Pulses: PRESENT: normal radial pulses, normal femoral pulses


Breast: PRESENT: Normal


GI/Abdominal exam: PRESENT: soft, other - no sig abd pain


Rectal exam: PRESENT: deferred


Extremities exam: PRESENT: full ROM


Musculoskeletal exam: PRESENT: full ROM


Neurological exam: PRESENT: alert, awake, oriented to person, oriented to place


Skin exam: PRESENT: dry





Results


Laboratory Results: 


                                        





                                 11/15/20 06:50 





                                        











  11/15/20





  06:50


 


Sodium  135.9 L


 


Potassium  4.1


 


Chloride  90 L


 


Carbon Dioxide  25


 


Anion Gap  21 H


 


BUN  71 H


 


Creatinine  7.91 H


 


Est GFR (African Amer)  8 L


 


Glucose  113 H


 


Calcium  8.7


 


Phosphorus  9.1 H


 


Albumin  2.9 L











Impressions: 


                                        





Abdomen/Pelvis CT  11/08/20 10:23


IMPRESSION:  Small bowel infarction secondary to incarcerated hernia at the 

umbilicus and proximal dilatation of small bowel loops.


Gas in the SMV and portal venous system in the liver.


 








Guidance Fluoroscopy  11/09/20 00:00


IMPRESSION:  IMAGE(S) OBTAINED DURING PROCEDURE.


 








Chest X-Ray  11/10/20 00:00


IMPRESSION:


 


1. Stable appearance of the chest.


 














Assessment & Plan





- Time


Anticipated Discharge Disposition: Home, Self Care


Anticipated Discharge Timeframe: unk





- Plan Summary


Plan Summary: 





s/p incarcerated umbilical hernia


renal failure on dialysis


pt has been unable to get out of bed since his surgery


has not had any return of bowel function


still [facility] ng tube with high output, 2600 yesterday


will try physical therapy. 


dulcolax supp today

## 2020-11-15 NOTE — PDOC PROGRESS REPORT
Subjective


Date:: 11/15/20


Subjective:: 


The patient is currently taking clear liquids.  He is passing gas but no stool.


Physical therapy got him up today he became quite orthostatic.  This is despite 

1 L of fluid last night.  He still feels quite weak.


Reason For Visit: 


HYPOXIC RESPIRATORY FAILURE, NEEDS DIALYSIS,








Physical Exam


Vital Signs: 


                                        











Temp Pulse Resp BP Pulse Ox


 


 98.0 F   106 H  18   118/63   91 L


 


 11/15/20 11:33  11/15/20 11:33  11/15/20 11:33  11/15/20 11:33  11/15/20 11:33








                                 Intake & Output











 11/14/20 11/15/20 11/16/20





 06:59 06:59 06:59


 


Intake Total 600 2216 1750


 


Output Total 1453 4100 0


 


Balance -853 -1884 1750


 


Weight 121.6 kg 122.7 kg 











General appearance: PRESENT: cooperative, mild distress, well-developed


Head exam: PRESENT: atraumatic, normocephalic, other - Nasogastric tube still in

place


Respiratory exam: PRESENT: decreased breath sounds - at bases, symmetrical, 

unlabored.  ABSENT: prolonged expiratory phas, rales, rhonchi, tachypnea, 

wheezes


Cardiovascular exam: PRESENT: RRR, +S1, +S2.  ABSENT: bradycardia, diastolic 

murmur, irregular rhythm, systolic murmur, tachycardia


GI/Abdominal exam: PRESENT: hypoactive bowel sounds, soft, tenderness


Rectal exam: PRESENT: deferred


Neurological exam: PRESENT: alert, awake, oriented to person, oriented to place,

oriented to situation, CN II-XII grossly intact


Psychiatric exam: PRESENT: flat affect.  ABSENT: agitated, anxious


Focused psych exam: ABSENT: delusional, paranoid, restlessness





Results


Laboratory Results: 


                                        





                                 11/15/20 07:20 





                                 11/15/20 06:50 





                                        











  11/15/20 11/15/20





  06:50 07:20


 


WBC   10.9 H


 


RBC   2.63 L


 


Hgb   8.7 L


 


Hct   25.5 L


 


MCV   97


 


MCH   32.9


 


MCHC   34.1


 


RDW   15.4 H


 


Plt Count   272


 


Sodium  135.9 L 


 


Potassium  4.1 


 


Chloride  90 L 


 


Carbon Dioxide  25 


 


Anion Gap  21 H 


 


BUN  71 H 


 


Creatinine  7.91 H 


 


Est GFR (African Amer)  8 L 


 


Glucose  113 H 


 


Calcium  8.7 


 


Phosphorus  9.1 H 


 


Albumin  2.9 L 











Impressions: 


                                        





Abdomen/Pelvis CT  11/08/20 10:23


IMPRESSION:  Small bowel infarction secondary to incarcerated hernia at the 

umbilicus and proximal dilatation of small bowel loops.


Gas in the SMV and portal venous system in the liver.


 








Guidance Fluoroscopy  11/09/20 00:00


IMPRESSION:  IMAGE(S) OBTAINED DURING PROCEDURE.


 








Chest X-Ray  11/10/20 00:00


IMPRESSION:


 


1. Stable appearance of the chest.


 














Assessment and Plan





- Diagnosis


(1) Ileus, postoperative


Is this a current diagnosis for this admission?: Yes   


Plan: 


Postop day 6.  Patient states that nasogastric tube was briefly out but he 

developed nausea and vomiting and it was placed back in. NG tube in place.  

Monitoring output.  Hypoactive bowel sounds today.


11/15/2020-tolerating clear liquids.  Positive for flatus but no bowel movement.








(2) Strangulated umbilical hernia


Is this a current diagnosis for this admission?: Yes   


Plan: 


Surgical repair completed.  Postop care per surgery service








(3) ESRD on hemodialysis


Is this a current diagnosis for this admission?: Yes   


Plan: 


Management per nephrology


11/15/2020-BUN and creatinine are 71 and 7.91 today.  Serum potassium is normal.

 With his orthostasis I am giving intermittent boluses of fluid.  He is oliguric

and in fact anuric over the last 2 days and this could be related to low volume.

 He is due for dialysis tomorrow.








(4) Anemia in chronic kidney disease (CKD)


Qualifiers: 


   Chronic kidney disease stage: on chronic dialysis   Qualified Code(s): N18.6 

- End stage renal disease; D63.1 - Anemia in chronic kidney disease; Z99.2 - 

Dependence on renal dialysis   


Is this a current diagnosis for this admission?: Yes   


Plan: 


Continue to monitor CBC


11/15/2020-hemoglobin is 8.7 today.  Continue to monitor.








(5) Hyperkalemia


Is this a current diagnosis for this admission?: Yes   


Plan: 


Address with dialysis


11/15/2020-his potassium in fact is normal at 4.1.  Continue to monitor.








(6) Hypertension


Qualifiers: 


   Hypertension type: essential hypertension   Qualified Code(s): I10 - [facility]

l (primary) hypertension   


Is this a current diagnosis for this admission?: Yes   


Plan: 


Patient complains of significant lightheadedness.  He is not exhibiting 

extremely high blood pressure.  He may be dry intravascularly.  I will give 1 L 

of fluid gently and monitor.  Check orthostatic vitals.


11/15/2020-positive orthostatic changes.  Boluses of fluid as above.  Holding 

any antihypertensive medications.








(7) Polycystic kidney disease


Is this a current diagnosis for this admission?: Yes   





- Time


Time Spent with patient: 15-24 minutes


Medications reviewed and adjusted accordingly: Yes


Anticipated Discharge Disposition: Home with Home Health


Anticipated Discharge Timeframe: Unknown

## 2020-11-16 NOTE — PDOC PROGRESS REPORT
Subjective


Date:: 11/16/20


Subjective:: 


The patient's biggest problem today are the intractable hiccups.  He has 

returned from dialysis and reports that they did not remove any significant 

amount of fluid.


Reason For Visit: 


HYPOXIC RESPIRATORY FAILURE, NEEDS DIALYSIS,








Physical Exam


Vital Signs: 


                                        











Temp Pulse Resp BP Pulse Ox


 


 98.4 F   112 H  17   146/67 H  73 L


 


 11/16/20 03:13  11/16/20 07:00  11/16/20 03:13  11/16/20 03:13  11/16/20 03:13








                                 Intake & Output











 11/15/20 11/16/20 11/17/20





 06:59 06:59 06:59


 


Intake Total 2216 2780 440


 


Output Total 4100 4200 540


 


Balance -1884 -1420 -100


 


Weight 122.7 kg 119.9 kg 











General appearance: PRESENT: cooperative, well-developed, other - moderate 

distress due to hiccups


Head exam: PRESENT: atraumatic, normocephalic


Ear exam: PRESENT: normal external ear exam.  ABSENT: bleeding, drainage


Mouth exam: PRESENT: moist, tongue midline, other - Nasogastric tube in place


Respiratory exam: PRESENT: clear to auscultation tasha - anteriorly, symmetrical, 

unlabored.  ABSENT: rales, rhonchi, tachypnea, wheezes


Cardiovascular exam: PRESENT: RRR, +S1, +S2.  ABSENT: bradycardia, diastolic 

murmur, irregular rhythm, systolic murmur, tachycardia


GI/Abdominal exam: PRESENT: hypoactive bowel sounds, soft, tenderness


Extremities exam: PRESENT: pedal edema - Trace


Neurological exam: PRESENT: alert, awake, oriented to person, oriented to place,

oriented to time, oriented to situation, CN II-XII grossly intact.  ABSENT: 

altered


Psychiatric exam: PRESENT: appropriate affect.  ABSENT: agitated, anxious


Focused psych exam: ABSENT: delusional, paranoid, restlessness





Results


Laboratory Results: 


                                        





                                 11/16/20 06:00 





                                 11/16/20 05:30 





                                        











  11/16/20 11/16/20





  05:30 06:00


 


WBC   10.0


 


RBC   2.78 L


 


Hgb   9.2 L


 


Hct   26.5 L


 


MCV   95


 


MCH   33.0


 


MCHC   34.6


 


RDW   15.3 H


 


Plt Count   315


 


Seg Neutrophils %   Not Reportable


 


Sodium  134.5 L 


 


Potassium  3.9 


 


Chloride  86 L 


 


Carbon Dioxide  25 


 


Anion Gap  24 H 


 


BUN  85 H 


 


Creatinine  9.47 H 


 


Est GFR (African Amer)  7 L 


 


Glucose  123 H 


 


Calcium  8.7 


 


Total Bilirubin  1.0 


 


AST  25 


 


Alkaline Phosphatase  134 H 


 


Total Protein  5.4 L 


 


Albumin  3.0 L 











Impressions: 


                                        





Abdomen/Pelvis CT  11/08/20 10:23


IMPRESSION:  Small bowel infarction secondary to incarcerated hernia at the 

umbilicus and proximal dilatation of small bowel loops.


Gas in the SMV and portal venous system in the liver.


 








Guidance Fluoroscopy  11/09/20 00:00


IMPRESSION:  IMAGE(S) OBTAINED DURING PROCEDURE.


 








Chest X-Ray  11/10/20 00:00


IMPRESSION:


 


1. Stable appearance of the chest.


 














Assessment and Plan





- Diagnosis


(1) Ileus, postoperative


Is this a current diagnosis for this admission?: Yes   


Plan: 


Postop day 6.  Patient states that nasogastric tube was briefly out but he 

developed nausea and vomiting and it was placed back in. NG tube in place.  

Monitoring output.  Hypoactive bowel sounds today.


11/15/2020-tolerating clear liquids.  Positive for flatus but no bowel movement.


11/16/2020-patient reports bowel movement today.  Remains on a clear liquid 

diet.








(2) Strangulated umbilical hernia


Is this a current diagnosis for this admission?: Yes   


Plan: 


Surgical repair completed.  Postop care per surgery service











(3) ESRD on hemodialysis


Is this a current diagnosis for this admission?: Yes   


Plan: 


Management per nephrology


11/15/2020-BUN and creatinine are 71 and 7.91 today.  Serum potassium is normal.

 With his orthostasis I am giving intermittent boluses of fluid.  He is oliguric

and in fact anuric over the last 2 days and this could be related to low volume.

 He is due for dialysis tomorrow.


11/16/2020-patient dialyzed today.  Minimal fluid removal.








(4) Anemia in chronic kidney disease (CKD)


Qualifiers: 


   Chronic kidney disease stage: on chronic dialysis   Qualified Code(s): N18.6 

- End stage renal disease; D63.1 - Anemia in chronic kidney disease; Z99.2 - 

Dependence on renal dialysis   


Is this a current diagnosis for this admission?: Yes   


Plan: 


Continue to monitor CBC


11/15/2020-hemoglobin is 8.7 today.  Continue to monitor.


11/16/2020-hemoglobin improving slowly.  9.2 today.








(5) Hyperkalemia


Is this a current diagnosis for this admission?: Yes   


Plan: 


Address with dialysis


11/15/2020-his potassium in fact is normal at 4.1.  Continue to monitor.


11/16/2020-potassium normal again at 3.9








(6) Hypertension


Qualifiers: 


   Hypertension type: essential hypertension   Qualified Code(s): I10 - 

Essential (primary) hypertension   


Is this a current diagnosis for this admission?: Yes   


Plan: 


Patient complains of significant lightheadedness.  He is not exhibiting 

extremely high blood pressure.  He may be dry intravascularly.  I will give 1 L 

of fluid gently and monitor.  Check orthostatic vitals.


11/15/2020-positive orthostatic changes.  Boluses of fluid as above.  Holding 

any antihypertensive medications.


11/16/2020-resting blood pressures are better.  Patient still reports 

lightheadedness with any change in position.  Patient dialyzed today but no 

significant fluid removal.  Continue to monitor.  Patient may still need small 

boluses of fluid.








(7) Polycystic kidney disease


Is this a current diagnosis for this admission?: Yes   


Plan: 


Stable








(8) Intractable hiccups


Is this a current diagnosis for this admission?: Yes   


Plan: 


11/16/2020-today his hiccups are worse than any of the day.  I will start 

scheduled Thorazine at 25 mg p.o. every 8 hours.  If no improvement will 

increase to every 6 hours.  If no improvement will increase to 50 mg doses.








- Time


Time Spent with patient: 15-24 minutes


Medications reviewed and adjusted accordingly: Yes


Anticipated Discharge Disposition: Unknown


Anticipated Discharge Timeframe: Unknown

## 2020-11-16 NOTE — PDOC PROGRESS REPORT
Subjective


Date:: 11/16/20


Reason For Visit: 


HYPOXIC RESPIRATORY FAILURE, NEEDS DIALYSIS,


Patient examined on dialysis.  Having hiccuping and nausea.





Physical Exam


Vital Signs: 


                                        











Temp Pulse Resp BP Pulse Ox


 


 97.8 F   120 H  18   124/58 L  95 


 


 11/16/20 12:01  11/16/20 12:01  11/16/20 12:01  11/16/20 12:01  11/16/20 12:01








                                 Intake & Output











 11/15/20 11/16/20 11/17/20





 06:59 06:59 06:59


 


Intake Total 2216 2780 440


 


Output Total 4100 4200 540


 


Balance -1884 -1420 -100


 


Weight 122.7 kg 119.9 kg 119.9 kg











General appearance: PRESENT: other - Mild distress, arouses, follows commands


GI/Abdominal exam: PRESENT: tenderness, other - And examined.  No peritoneal 

signs or rigidity.  Operative drain removed.  Tenckhoff catheter site in good 

condition.





Results


Laboratory Results: 


                                        





                                 11/16/20 06:00 





                                 11/16/20 05:30 





                                        











  11/16/20 11/16/20





  05:30 06:00


 


WBC   10.0


 


RBC   2.78 L


 


Hgb   9.2 L


 


Hct   26.5 L


 


MCV   95


 


MCH   33.0


 


MCHC   34.6


 


RDW   15.3 H


 


Plt Count   315


 


Seg Neutrophils %   Not Reportable


 


Sodium  134.5 L 


 


Potassium  3.9 


 


Chloride  86 L 


 


Carbon Dioxide  25 


 


Anion Gap  24 H 


 


BUN  85 H 


 


Creatinine  9.47 H 


 


Est GFR (African Amer)  7 L 


 


Glucose  123 H 


 


Calcium  8.7 


 


Total Bilirubin  1.0 


 


AST  25 


 


Alkaline Phosphatase  134 H 


 


Total Protein  5.4 L 


 


Albumin  3.0 L 











Impressions: 


                                        





Abdomen/Pelvis CT  11/08/20 10:23


IMPRESSION:  Small bowel infarction secondary to incarcerated hernia at the 

umbilicus and proximal dilatation of small bowel loops.


Gas in the SMV and portal venous system in the liver.


 








Guidance Fluoroscopy  11/09/20 00:00


IMPRESSION:  IMAGE(S) OBTAINED DURING PROCEDURE.


 








Chest X-Ray  11/10/20 00:00


IMPRESSION:


 


1. Stable appearance of the chest.


 














Assessment & Plan





- Diagnosis


(1) Incarcerated umbilical hernia


Is this a current diagnosis for this admission?: Yes   


Plan: 


Impression: Patient 9 days status post umbilical hernia repair with mesh, drain 

out.  Complete return of bowel function, presumed ileus, with nasogastric 

decompression.  No formal diagnostic evaluation performed thus far





Recommendations:





1.  Continue nasogastric tube decompression





2.  If patient not improved by tomorrow, will consider CT scan of abdomen and 

pelvis to rule out occult intra-abdominal pathology.








- Time


Anticipated Discharge Disposition: Skilled Nursing Facility


Anticipated Discharge Timeframe: within 48 hours


Critical Time spent with patient: Less than 15 minutes


Smoking Cessation Education: 3 to 10 minutes

## 2020-11-16 NOTE — PDOC PROGRESS REPORT
Subjective


Date:: 11/16/20


Reason For Visit: 


Patient has been transferred from the ICU to the fifth floor and therefore was 

seen on the fourth floor dialysis unit.  He is undergoing dialysis currently 

without any issues.  Having intermittent nausea and persistent hiccups.  He 

denies any history of severe abdominal pains or vomiting.  He had been 

decompressed with an NG tube for the last couple of days.  Labs and medications 

were reviewed.  Dialysis orders were reviewed with the treating dialysis nurse.








Physical Exam


Vital Signs: 


                                        











Temp Pulse Resp BP Pulse Ox


 


 97.8 F   120 H  18   124/58 L  95 


 


 11/16/20 12:01  11/16/20 12:01  11/16/20 12:01  11/16/20 12:01  11/16/20 12:01








                                 Intake & Output











 11/15/20 11/16/20 11/17/20





 06:59 06:59 06:59


 


Intake Total 2216 2780 440


 


Output Total 4100 4200 540


 


Balance -1884 -1420 -100


 


Weight 122.7 kg 119.9 kg 119.9 kg











General appearance: PRESENT: no acute distress


Respiratory exam: PRESENT: clear to auscultation tasha, decreased breath sounds.  

ABSENT: crackles


Cardiovascular exam: PRESENT: +S1, +S2


GI/Abdominal exam: PRESENT: distended, tenderness.  ABSENT: guarding, normal 

bowel sounds, organomegaly


Extremities exam: ABSENT: joint swelling, pedal edema


Neurological exam: PRESENT: alert, awake, oriented to person


Psychiatric exam: PRESENT: appropriate affect





Results


Laboratory Results: 


                                        





                                 11/16/20 06:00 





                                 11/16/20 05:30 





                                        











  11/16/20 11/16/20





  05:30 06:00


 


WBC   10.0


 


RBC   2.78 L


 


Hgb   9.2 L


 


Hct   26.5 L


 


MCV   95


 


MCH   33.0


 


MCHC   34.6


 


RDW   15.3 H


 


Plt Count   315


 


Seg Neutrophils %   Not Reportable


 


Sodium  134.5 L 


 


Potassium  3.9 


 


Chloride  86 L 


 


Carbon Dioxide  25 


 


Anion Gap  24 H 


 


BUN  85 H 


 


Creatinine  9.47 H 


 


Est GFR (African Amer)  7 L 


 


Glucose  123 H 


 


Calcium  8.7 


 


Total Bilirubin  1.0 


 


AST  25 


 


Alkaline Phosphatase  134 H 


 


Total Protein  5.4 L 


 


Albumin  3.0 L 











Impressions: 


                                        





Abdomen/Pelvis CT  11/08/20 10:23


IMPRESSION:  Small bowel infarction secondary to incarcerated hernia at the 

umbilicus and proximal dilatation of small bowel loops.


Gas in the SMV and portal venous system in the liver.


 








Guidance Fluoroscopy  11/09/20 00:00


IMPRESSION:  IMAGE(S) OBTAINED DURING PROCEDURE.


 








Chest X-Ray  11/10/20 00:00


IMPRESSION:


 


1. Stable appearance of the chest.


 














Assessment & Plan





- Diagnosis


(1) End stage renal disease


Is this a current diagnosis for this admission?: Yes   


Plan: 


Patient initially on peritoneal dialysis but was converted to back up 

hemodialysis through a permacath for now given his recent abdominal surgery.  PD

catheter left behind for later usage.  Patient currently being dialyzed.  Plan 

500-1 l  fluid removal/minimal.  Dialysis orders were reviewed with the treating

dialysis nurse.








(2) Hypotension


Is this a current diagnosis for this admission?: Yes   


Plan: 


Resolved.








(3) Incarcerated umbilical hernia


Is this a current diagnosis for this admission?: Yes   


Plan: 


Status post surgery with no untoward complications.  Being managed by surgery. 

Looks like the patient had possible ileus and he has been decompressed with NG 

tube with success.








(4) Polycystic kidney disease


Is this a current diagnosis for this admission?: Yes   


Plan: 


Status quo.








(5) Anemia in chronic kidney disease (CKD)


Qualifiers: 


   Chronic kidney disease stage: on chronic dialysis   Qualified Code(s): N18.6 

- End stage renal disease; D63.1 - Anemia in chronic kidney disease; Z99.2 - 

Dependence on renal dialysis   


Is this a current diagnosis for this admission?: Yes   


Plan: 


Adjust erythropoietin.  Monitor.

## 2020-11-17 NOTE — PDOC PROGRESS REPORT
Subjective


Date:: 11/17/20


Subjective:: 


I was contacted by surgery.  There is significant concern for tachycardia and 

the patient's overall clinical state.  Was asked to reassess the patient.


The patient was quite sleepy.  He did have a CT scan of the abdomen earlier 

today.  Nasogastric tube is still in place.


Reason For Visit: 


HYPOXIC RESPIRATORY FAILURE, NEEDS DIALYSIS,








Physical Exam


Vital Signs: 


                                        











Temp Pulse Resp BP Pulse Ox


 


 98.1 F   101 H  17   131/54 H  98 


 


 11/17/20 15:53  11/17/20 15:53  11/17/20 15:53  11/17/20 15:53  11/17/20 15:53








                                 Intake & Output











 11/16/20 11/17/20 11/18/20





 06:59 06:59 06:59


 


Intake Total 2780 940 480


 


Output Total 4200 1490 


 


Balance -1420 -550 480


 


Weight 119.9 kg 119.2 kg 











General appearance: PRESENT: other - Patient somewhat lethargic.  He did open 

his eyes.  Eyes remained open for short while and then he closes them again.


Head exam: PRESENT: atraumatic, normocephalic, other - Sunken eye sockets


Eye exam: PRESENT: conjunctiva pale, EOMI.  ABSENT: nystagmus, scleral icterus


Ear exam: PRESENT: normal external ear exam.  ABSENT: bleeding, drainage


Mouth exam: PRESENT: dry mucosa - Extremely dry oral mucosa, tongue midline


Respiratory exam: PRESENT: clear to auscultation tasha, symmetrical, unlabored.  

ABSENT: rales, rhonchi, tachypnea, wheezes


Cardiovascular exam: PRESENT: +S1, +S2, tachycardia


GI/Abdominal exam: PRESENT: normal bowel sounds, soft.  ABSENT: distended, 

guarding, tenderness


Rectal exam: PRESENT: deferred


Gentrourinary exam: ABSENT: indwelling catheter


Extremities exam: ABSENT: pedal edema


Musculoskeletal exam: PRESENT: other - Puffy upper extremities.  ABSENT: 

deformity, dislocation


Neurological exam: PRESENT: awake, oriented to person, oriented to place, 

oriented to situation.  ABSENT: alert - Somewhat lethargic


Psychiatric exam: PRESENT: flat affect.  ABSENT: agitated, anxious


Focused psych exam: ABSENT: delusional, paranoid, restlessness


Skin exam: PRESENT: other - Positive skin tenting





Results


Laboratory Results: 


                                        





                                 11/16/20 06:00 





                                 11/16/20 05:30 








Impressions: 


                                        





Guidance Fluoroscopy  11/09/20 00:00


IMPRESSION:  IMAGE(S) OBTAINED DURING PROCEDURE.


 








Chest X-Ray  11/10/20 00:00


IMPRESSION:


 


1. Stable appearance of the chest.


 








Abdomen/Pelvis CT  11/17/20 00:00


IMPRESSION:


1. NASOGASTRIC TUBE, TIP IN THE STOMACH.  CONTRAST IN THE STOMACH AND THROUGHOUT

THE SMALL BOWEL.  DILATED PROXIMAL SMALL BOWEL AND NONDISTENDED DISTAL SMALL 

BOWEL.  PROBABLY PARTIAL SMALL BOWEL OBSTRUCTION.


2. SMALL UMBILICAL HERNIA, CONTAINING A SMALL AMOUNT OF GAS PRESUMED SECONDARY 

TO RECENT SURGERY.


3. COLONIC DIVERTICULOSIS.  NO CT FINDINGS OF ACUTE DIVERTICULITIS.


4. CHRONIC POLYCYSTIC KIDNEY DISEASE.


5. PERITONEAL DIALYSIS CATHETER, DISTAL END IN THE PELVIS.


6. NO OTHER ACUTE OR SIGNIFICANT FINDINGS.


 














Assessment and Plan





- Diagnosis


(1) Ileus, postoperative


Is this a current diagnosis for this admission?: Yes   





(2) Strangulated umbilical hernia


Is this a current diagnosis for this admission?: Yes   





(3) ESRD on hemodialysis


Is this a current diagnosis for this admission?: Yes   





(4) Anemia in chronic kidney disease (CKD)


Qualifiers: 


   Chronic kidney disease stage: on chronic dialysis   Qualified Code(s): N18.6 

- End stage renal disease; D63.1 - Anemia in chronic kidney disease; Z99.2 - 

Dependence on renal dialysis   


Is this a current diagnosis for this admission?: Yes   





(5) Hyperkalemia


Is this a current diagnosis for this admission?: Yes   





(6) Hypertension


Qualifiers: 


   Hypertension type: essential hypertension   Qualified Code(s): I10 - 

Essential (primary) hypertension   


Is this a current diagnosis for this admission?: Yes   





(7) Polycystic kidney disease


Is this a current diagnosis for this admission?: Yes   





(8) Intractable hiccups


Is this a current diagnosis for this admission?: Yes   





- Plan Summary


Summary: 


11/17/2020


8:15 PM


Critical care visit


40 minutes


I assessed the patient and then reviewed the use computer records.  I given the 

patient a liter of fluid earlier today.  His blood pressure responded.  He 

admits that he felt better.  This was just prior to the CAT scan.


He still has a large net negative fluid balance.  He has skin tenting and his 

oral mucosa is extremely dry.  He has sunken eye sockets.  His lungs are clear.


After reviewing all of the data including laboratory studies and imaging I 

believe the patient is hypovolemic and this is the reason for the tachycardia.  

In addition, early in his stay he is oliguric.  Of late he has been an uric.


If you compare the heart rate to the blood pressures his blood pressures have 

been relatively soft.


I believe that the patient's blood pressure and pulse are directly related to 

his volume status.  With the nurse at the bedside we discussed the treatment 

plan with the patient.


IV fluid


The patient will receive a 500 mL bolus and then run the remaining fluid at 150 

mL an hour for a total of 2 L of fluid overnight.


Hiccups


The hiccups have responded to the Thorazine.  I was decreasing the Thorazine 

dose.  At this point I will discontinue it as a scheduled medication but leave 

it available as needed every 12 hours for hiccups.  This medication is quite 

sedating and by removing it the patient may have more energy.


Cortisol


Random cortisol drawn early in this hospitalization was 99.  I have ordered a 

morning cortisol for tomorrow in addition to his normal laboratory studies.  I 

will see if there is any evidence of adrenal insufficiency.





- Time


Total Critical Time (Minutes): 40


Medications reviewed and adjusted accordingly: Yes


Anticipated Discharge Disposition: Home with Home Health


Anticipated Discharge Timeframe: Unknown

## 2020-11-17 NOTE — RADIOLOGY REPORT (SQ)
EXAM DESCRIPTION:  CT ABD/PELVIS ORAL ONLY



IMAGES COMPLETED DATE/TIME:  11/17/2020 1:48 pm



REASON FOR STUDY:  r/o sbo,r/o post op complication



COMPARISON:  11/8/2020.



TECHNIQUE:  CT scan of the abdomen and pelvis performed without intravenous contrast and with oral co
ntrast. Images reviewed with lung, soft tissue, and bone windows. Reconstructed coronal and sagittal 
MPR images reviewed. All images stored on PACS.

All CT scanners at this facility use dose modulation, iterative reconstruction, and/or weight based d
osing when appropriate to reduce radiation dose to as low as reasonably achievable (ALARA).

[facility]: Dose Right  [facility]: CareDose    [facility]: Dose Right    [facility]: Teradose 4D    [facility]: Smart Technologies



RADIATION DOSE:  mGy.



LIMITATIONS:  None.



FINDINGS:  LOWER CHEST: Linear atelectasis in the lung bases.

NON-CONTRASTED LIVER, SPLEEN, ADRENALS: Evaluation limited by lack of IV contrast. No identified sign
ificant masses.

PANCREAS: No masses. No peripancreatic inflammatory changes.

GALLBLADDER: No identified stones by CT criteria. No inflammatory changes to suggest cholecystitis.

RIGHT KIDNEY AND URETER: Chronic polycystic kidney disease. Assessment limited by lack of IV contrast
.   No significant calcifications.   No hydronephrosis or hydroureter.

LEFT KIDNEY AND URETER: Chronic paucity kidney disease. Assessment limited by lack of IV contrast.   
No significant calcifications.   No hydronephrosis or hydroureter.

AORTA AND RETROPERITONEUM: No aneurysm. No retroperitoneal masses or adenopathy.

BOWEL AND PERITONEAL CAVITY: Nasogastric tube, tip in the stomach.  Contrast in the stomach, small glendy
wel, and proximal colon.  Dilated proximal small bowel.  Nondistended distal small bowel.  No obvious
 masses or inflammatory changes. No free fluid.

APPENDIX: Not visualized.

PELVIS, BLADDER, AND ABDOMINAL WALL:Peroneal dialysis catheter, distal end in the pelvis.  Small umbi
lical hernia.  No abnormal masses. No free fluid. Bladder normal.

BONES: No significant findings.

OTHER: No other significant finding.



IMPRESSION:

1. NASOGASTRIC TUBE, TIP IN THE STOMACH.  CONTRAST IN THE STOMACH AND THROUGHOUT THE SMALL BOWEL.  DI
LATED PROXIMAL SMALL BOWEL AND NONDISTENDED DISTAL SMALL BOWEL.  PROBABLY PARTIAL SMALL BOWEL OBSTRUC
TION.

2. SMALL UMBILICAL HERNIA, CONTAINING A SMALL AMOUNT OF GAS PRESUMED SECONDARY TO RECENT SURGERY.

3. COLONIC DIVERTICULOSIS.  NO CT FINDINGS OF ACUTE DIVERTICULITIS.

4. CHRONIC POLYCYSTIC KIDNEY DISEASE.

5. PERITONEAL DIALYSIS CATHETER, DISTAL END IN THE PELVIS.

6. NO OTHER ACUTE OR SIGNIFICANT FINDINGS.



COMMENT:  Quality ID # [id number]: Final reports with documentation of one or more dose reduction techniques
 (e.g., Automated exposure control, adjustment of the mA and/or kV according to patient size, use of 
iterative reconstruction technique)



TECHNICAL DOCUMENTATION:  JOB ID:  [id number]

 2011 [employer]- All Rights Reserved



Reading location - IP/workstation name: [email or web address]

## 2020-11-17 NOTE — PDOC PROGRESS REPORT
Subjective


Date:: 11/17/20


Subjective:: 


Had a bowel movement over the weekend.  Passing gas.  Patient has NG tube to 

suction and has been taking clear liquids with resultant suctioning of the clear

liquids out the NG tube.





Reason For Visit: 


HYPOXIC RESPIRATORY FAILURE, NEEDS DIALYSIS,








Physical Exam


Vital Signs: 


                                        











Temp Pulse Resp BP Pulse Ox


 


 97.8 F   114 H  20   93/64 L  97 


 


 11/17/20 03:33  11/17/20 03:33  11/17/20 03:33  11/17/20 03:33  11/17/20 03:33








                                 Intake & Output











 11/16/20 11/17/20 11/18/20





 06:59 06:59 06:59


 


Intake Total 2780 940 


 


Output Total 4200 1490 


 


Balance -1420 -550 


 


Weight 119.9 kg 119.2 kg 











General appearance: PRESENT: no acute distress, cooperative


Respiratory exam: PRESENT: clear to auscultation tasha


Cardiovascular exam: PRESENT: tachycardia


GI/Abdominal exam: PRESENT: other - Soft, nondistended, tenderness to palpation 

in the left upper quadrant without peritoneal signs.  Transverse abdominal wound

is clean dry and intact with no erythema and no drainage.  His drain has been 

pulled.  He has a PD catheter in place.  There is active bowel sounds.


Extremities exam: PRESENT: other - No swelling and no tenderness.





Results


Laboratory Results: 


                                        





                                 11/16/20 06:00 





                                 11/16/20 05:30 








Impressions: 


                                        





Abdomen/Pelvis CT  11/08/20 10:23


IMPRESSION:  Small bowel infarction secondary to incarcerated hernia at the 

umbilicus and proximal dilatation of small bowel loops.


Gas in the SMV and portal venous system in the liver.


 








Guidance Fluoroscopy  11/09/20 00:00


IMPRESSION:  IMAGE(S) OBTAINED DURING PROCEDURE.


 








Chest X-Ray  11/10/20 00:00


IMPRESSION:


 


1. Stable appearance of the chest.


 














Assessment & Plan





- Diagnosis


(1) Ileus, postoperative


Is this a current diagnosis for this admission?: Yes   


Plan: 


Appears to have resolved but with his tenderness in the left abdomen along with 

mild tachycardia, will obtain abdominal pelvic CT scan with oral contrast prior 

to pulling his NG tube.








- Time


Anticipated Discharge Disposition: Home with Home Health


Anticipated Discharge Timeframe: within 72 hours

## 2020-11-17 NOTE — PDOC PROGRESS REPORT
Subjective


Date:: 11/17/20


Subjective:: 





Patient looks washed out.  Reviewed intake and output and still with a marked ne

gative fluid balance.  Preparing for a CT scan with oral contrast.


Reason For Visit: 


HYPOXIC RESPIRATORY FAILURE, NEEDS DIALYSIS,








Physical Exam


Vital Signs: 


                                        











Temp Pulse Resp BP Pulse Ox


 


 97.5 F   112 H  20   110/58 L  91 L


 


 11/17/20 08:17  11/17/20 08:17  11/17/20 08:17  11/17/20 08:17  11/17/20 08:17








                                 Intake & Output











 11/16/20 11/17/20 11/18/20





 06:59 06:59 06:59


 


Intake Total 2780 940 


 


Output Total 4200 1490 


 


Balance -1420 -550 


 


Weight 119.9 kg 119.2 kg 











General appearance: PRESENT: cooperative, well-developed, other - Moderate 

distress


Head exam: PRESENT: atraumatic, normocephalic


Ear exam: PRESENT: normal external ear exam.  ABSENT: bleeding, drainage


Mouth exam: PRESENT: dry mucosa, tongue midline


Respiratory exam: PRESENT: clear to auscultation tasha, symmetrical, unlabored.  

ABSENT: rales, rhonchi, tachypnea, wheezes


Cardiovascular exam: PRESENT: RRR, +S1, +S2, tachycardia.  ABSENT: bradycardia, 

diastolic murmur, irregular rhythm, systolic murmur


GI/Abdominal exam: PRESENT: hypoactive bowel sounds, soft


Neurological exam: PRESENT: alert, awake, oriented to person, oriented to place,

oriented to time, oriented to situation, CN II-XII grossly intact


Psychiatric exam: PRESENT: flat affect.  ABSENT: agitated, anxious





Results


Laboratory Results: 


                                        





                                 11/16/20 06:00 





                                 11/16/20 05:30 








Impressions: 


                                        





Abdomen/Pelvis CT  11/08/20 10:23


IMPRESSION:  Small bowel infarction secondary to incarcerated hernia at the 

umbilicus and proximal dilatation of small bowel loops.


Gas in the SMV and portal venous system in the liver.


 








Guidance Fluoroscopy  11/09/20 00:00


IMPRESSION:  IMAGE(S) OBTAINED DURING PROCEDURE.


 








Chest X-Ray  11/10/20 00:00


IMPRESSION:


 


1. Stable appearance of the chest.


 














Assessment and Plan





- Diagnosis


(1) Ileus, postoperative


Is this a current diagnosis for this admission?: Yes   


Plan: 


Appears to have resolved but with his tenderness in the left abdomen along with 

mild tachycardia, will obtain abdominal pelvic CT scan with oral contrast prior 

to pulling his NG tube.








(2) Strangulated umbilical hernia


Is this a current diagnosis for this admission?: Yes   


Plan: 


Surgical repair completed.  Postop care per surgery service











(3) ESRD on hemodialysis


Is this a current diagnosis for this admission?: Yes   


Plan: 


Management per nephrology


11/15/2020-BUN and creatinine are 71 and 7.91 today.  Serum potassium is normal.

 With his orthostasis I am giving intermittent boluses of fluid.  He is oliguric

and in fact anuric over the last 2 days and this could be related to low volume.

 He is due for dialysis tomorrow.


11/16/2020-patient dialyzed today.  Minimal fluid removal.


11/17/2020-still with negative fluid balance.  Therefore will give 1 L of LR 

bolus.  Trying to neutralize orthostasis so that patient can begin getting out 

of bed.








(4) Anemia in chronic kidney disease (CKD)


Qualifiers: 


   Chronic kidney disease stage: on chronic dialysis   Qualified Code(s): N18.6 

- End stage renal disease; D63.1 - Anemia in chronic kidney disease; Z99.2 - 

Dependence on renal dialysis   


Is this a current diagnosis for this admission?: Yes   


Plan: 


Continue to monitor CBC


11/15/2020-hemoglobin is 8.7 today.  Continue to monitor.


11/16/2020-hemoglobin improving slowly.  9.2 today.


11/17/2020-recheck labs tomorrow








(5) Hyperkalemia


Is this a current diagnosis for this admission?: Yes   


Plan: 


Address with dialysis


11/15/2020-his potassium in fact is normal at 4.1.  Continue to monitor.


11/16/2020-potassium normal again at 3.9


11/17/2020-potassium normal yesterday.  Recheck tomorrow.








(6) Hypertension


Qualifiers: 


   Hypertension type: essential hypertension   Qualified Code(s): I10 - 

Essential (primary) hypertension   


Is this a current diagnosis for this admission?: Yes   


Plan: 


Patient complains of significant lightheadedness.  He is not exhibiting 

extremely high blood pressure.  He may be dry intravascularly.  I will give 1 L 

of fluid gently and monitor.  Check orthostatic vitals.


11/15/2020-positive orthostatic changes.  Boluses of fluid as above.  Holding a

ny antihypertensive medications.


11/16/2020-resting blood pressures are better.  Patient still reports 

lightheadedness with any change in position.  Patient dialyzed today but no 

significant fluid removal.  Continue to monitor.  Patient may still need small 

boluses of fluid.


11/17/2020-blood pressure still soft.  Tachycardia likely indicates hypovolemia.








(7) Polycystic kidney disease


Is this a current diagnosis for this admission?: Yes   


Plan: 


Stable








(8) Intractable hiccups


Is this a current diagnosis for this admission?: Yes   


Plan: 


11/16/2020-today his hiccups are worse than any of the day.  I will start 

scheduled Thorazine at 25 mg p.o. every 8 hours.  If no improvement will 

increase to every 6 hours.  If no improvement will increase to 50 mg doses.


11/17/2020-no hiccups today.  Consider stopping Thorazine.








- Time


Time Spent with patient: 15-24 minutes


Medications reviewed and adjusted accordingly: Yes


Anticipated Discharge Disposition: Unknown


Anticipated Discharge Timeframe: Unknown

## 2020-11-17 NOTE — PDOC PROGRESS REPORT
Subjective


Date:: 11/17/20


Subjective:: 





Short of breath.  Bilateral lower extremity discomfort.


Reason For Visit: 


HYPOXIC RESPIRATORY FAILURE, NEEDS DIALYSIS,








Physical Exam


Vital Signs: 


                                        











Temp Pulse Resp BP Pulse Ox


 


 98.1 F   101 H  17   131/54 H  98 


 


 11/17/20 15:53  11/17/20 15:53  11/17/20 15:53  11/17/20 15:53  11/17/20 15:53








                                 Intake & Output











 11/16/20 11/17/20 11/18/20





 06:59 06:59 06:59


 


Intake Total 2780 940 480


 


Output Total 4200 1490 


 


Balance -1420 -550 480


 


Weight 119.9 kg 119.2 kg 











General appearance: PRESENT: cooperative


Respiratory exam: PRESENT: tachypnea


Cardiovascular exam: PRESENT: RRR


GI/Abdominal exam: PRESENT: other - Soft, nondistended, mild left-sided 

abdominal tenderness.  Wound clean dry and intact with no erythema.  Active 

bowel sounds.


Extremities exam: PRESENT: other - There is no swelling in the lower extremities

and his calves feels soft he has palpable dorsalis pedis pulses.





Results


Laboratory Results: 


                                        





                                 11/16/20 06:00 





                                 11/16/20 05:30 








Impressions: 


                                        





Guidance Fluoroscopy  11/09/20 00:00


IMPRESSION:  IMAGE(S) OBTAINED DURING PROCEDURE.


 








Chest X-Ray  11/10/20 00:00


IMPRESSION:


 


1. Stable appearance of the chest.


 








Abdomen/Pelvis CT  11/17/20 00:00


IMPRESSION:


1. NASOGASTRIC TUBE, TIP IN THE STOMACH.  CONTRAST IN THE STOMACH AND THROUGHOUT

THE SMALL BOWEL.  DILATED PROXIMAL SMALL BOWEL AND NONDISTENDED DISTAL SMALL 

BOWEL.  PROBABLY PARTIAL SMALL BOWEL OBSTRUCTION.


2. SMALL UMBILICAL HERNIA, CONTAINING A SMALL AMOUNT OF GAS PRESUMED SECONDARY 

TO RECENT SURGERY.


3. COLONIC DIVERTICULOSIS.  NO CT FINDINGS OF ACUTE DIVERTICULITIS.


4. CHRONIC POLYCYSTIC KIDNEY DISEASE.


5. PERITONEAL DIALYSIS CATHETER, DISTAL END IN THE PELVIS.


6. NO OTHER ACUTE OR SIGNIFICANT FINDINGS.


 














Assessment & Plan





- Diagnosis


(1) Ileus, postoperative


Is this a current diagnosis for this admission?: Yes   


Plan: 


Appears to have resolved.  There is caliber change of the small bowel noted on 

CT but contrast is clearly seen in the distal small bowel and I do not think it 

represents partial small bowel obstruction.  Will clamp the NG tube overnight.  

If residual in the morning is low, will pull the NG and start him on a regular 

renal diet.








(2) Tachypnea


Is this a current diagnosis for this admission?: Yes   


Plan: 


Patient is short of breath.  I do not hear crackles in his lungs to suggest 

pulmonary edema.  His oxygen saturations are 98% on 2 L.  He has good pulses in 

bilateral lower extremity and his lower extremities are not grossly edematous.  

Unsure of the etiology of this shortness of breath.  I have asked hospitalist to

reevaluate the patient. 








- Time


Anticipated Discharge Disposition: Home, Self Care


Anticipated Discharge Timeframe: within 72 hours

## 2020-11-18 NOTE — RADIOLOGY REPORT (SQ)
EXAM DESCRIPTION:  KUB/ABDOMEN (SINGLE VIEW)



IMAGES COMPLETED DATE/TIME:  11/18/2020 11:21 am



REASON FOR STUDY:  ? sbo



COMPARISON:  None.



NUMBER OF VIEWS:  One view.



TECHNIQUE:   Supine radiographic image of the abdomen acquired.



LIMITATIONS:  None.



FINDINGS:  BOWEL GAS PATTERN: Dilated large and small bowel.  This could represent ileus or partial s
mall bowel obstruction.  NG tube is in place.  There is contrast throughout the colon from prior CT.

CALCIFICATIONS: No suspicious calcifications.

SOFT TISSUES: No gross mass or suggestion of organomegaly.

HARDWARE: None in the abdomen.

BONES: No acute fracture. No worrisome bone lesions.

OTHER: No other significant finding.



IMPRESSION:  Persistent dilated at least proximal small bowel.  There is air and stool as well as con
trast throughout the colon.  Findings are consistent with partial small bowel obstruction.



TECHNICAL DOCUMENTATION:  JOB ID:  [id number]

 2011 [employer]- All Rights Reserved



Reading location - IP/workstation name: [email or web address]

## 2020-11-18 NOTE — PDOC PROGRESS REPORT
Subjective


Date:: 11/18/20


Subjective:: 





11/18/20-KUB was done this morning it indicated of persistent partial small tara

l obstruction.  Patient still have NG tube.  No acute events in the last 24 

hours.  pt completed his dialysis session this morning.


Reason For Visit: 


HYPOXIC RESPIRATORY FAILURE, NEEDS DIALYSIS,








Physical Exam


Vital Signs: 


                                        











Temp Pulse Resp BP Pulse Ox


 


 98.3 F   109 H  20   110/52 L  93 


 


 11/18/20 03:51  11/18/20 10:00  11/18/20 10:00  11/18/20 10:00  11/18/20 10:00








                                 Intake & Output











 11/17/20 11/18/20 11/19/20





 06:59 06:59 06:59


 


Intake Total 940 2680 


 


Output Total 1490  


 


Balance -550 2680 


 


Weight 119.2 kg 122.5 kg 











General appearance: PRESENT: no acute distress, cooperative, morbidly obese


Head exam: PRESENT: atraumatic


Eye exam: PRESENT: PERRLA


Ear exam: PRESENT: normal external ear exam


Mouth exam: PRESENT: neck supple


Teeth exam: PRESENT: poor dentation


Neck exam: PRESENT: other - Nasogastric tube in place..  ABSENT: carotid bruit, 

JVD, lymphadenopathy, thyromegaly


Respiratory exam: PRESENT: decreased breath sounds


Cardiovascular exam: PRESENT: RRR.  ABSENT: diastolic murmur, rubs, systolic 

murmur


GI/Abdominal exam: PRESENT: normal bowel sounds, soft.  ABSENT: distended, 

guarding, mass, organolmegaly, rebound, tenderness


Rectal exam: PRESENT: deferred


Extremities exam: PRESENT: full ROM.  ABSENT: calf tenderness, clubbing, pedal 

edema


Neurological exam: PRESENT: alert, awake, oriented to person, oriented to place,

oriented to time, oriented to situation, CN II-XII grossly intact.  ABSENT: 

motor sensory deficit


Psychiatric exam: PRESENT: appropriate affect, normal mood.  ABSENT: homicidal 

ideation, suicidal ideation





Results


Laboratory Results: 


                                        





                                 11/18/20 05:05 





                                 11/18/20 05:05 





                                        











  11/18/20 11/18/20





  05:05 05:05


 


WBC  6.4 


 


RBC  2.25 L 


 


Hgb  7.4 L 


 


Hct  21.8 L 


 


MCV  97 


 


MCH  32.7 


 


MCHC  33.9 


 


RDW  15.3 H 


 


Plt Count  287 


 


Seg Neutrophils %  Not Reportable 


 


Sodium   133.0 L


 


Potassium   4.0


 


Chloride   93 L


 


Carbon Dioxide   23


 


Anion Gap   17


 


BUN   72 H


 


Creatinine   8.09 H


 


Est GFR (African Amer)   8 L


 


Glucose   114 H


 


Calcium   7.8 L


 


Magnesium   2.3


 


Total Bilirubin   0.6


 


AST   23


 


Alkaline Phosphatase   128 H


 


Total Protein   4.7 L


 


Albumin   2.4 L











Impressions: 


                                        





Guidance Fluoroscopy  11/09/20 00:00


IMPRESSION:  IMAGE(S) OBTAINED DURING PROCEDURE.


 








Chest X-Ray  11/10/20 00:00


IMPRESSION:


 


1. Stable appearance of the chest.


 








Abdomen/Pelvis CT  11/17/20 00:00


IMPRESSION:


1. NASOGASTRIC TUBE, TIP IN THE STOMACH.  CONTRAST IN THE STOMACH AND THROUGHOUT

THE SMALL BOWEL.  DILATED PROXIMAL SMALL BOWEL AND NONDISTENDED DISTAL SMALL 

BOWEL.  PROBABLY PARTIAL SMALL BOWEL OBSTRUCTION.


2. SMALL UMBILICAL HERNIA, CONTAINING A SMALL AMOUNT OF GAS PRESUMED SECONDARY 

TO RECENT SURGERY.


3. COLONIC DIVERTICULOSIS.  NO CT FINDINGS OF ACUTE DIVERTICULITIS.


4. CHRONIC POLYCYSTIC KIDNEY DISEASE.


5. PERITONEAL DIALYSIS CATHETER, DISTAL END IN THE PELVIS.


6. NO OTHER ACUTE OR SIGNIFICANT FINDINGS.


 








KUB X-Ray  11/18/20 00:00


IMPRESSION:  Persistent dilated at least proximal small bowel.  There is air and

stool as well as contrast throughout the colon.  Findings are consistent with 

partial small bowel obstruction.


 














Assessment and Plan





- Diagnosis


(1) Ileus, postoperative


Is this a current diagnosis for this admission?: Yes   


Plan: 


Appears to have resolved.  There is caliber change of the small bowel noted on 

CT but contrast is clearly seen in the distal small bowel and I do not think it 

represents partial small bowel obstruction.  Will clamp the NG tube overnight.  

If residual in the morning is low, will pull the NG and start him on a regular 

renal diet.








11/18/2020-KUB this morning indicated of a partial small bowel obstruction.  

Patient still has the NG tube.  Further management as per the surgical team.








(2) Strangulated umbilical hernia


Is this a current diagnosis for this admission?: Yes   


Plan: 


Surgical repair completed.  Postop care per surgery service








11/18/2020-patient admitted with strangulated umbilical hernia status post 

surgery.  Further management as per surgical team.








(3) ESRD on peritoneal dialysis


Is this a current diagnosis for this admission?: No   


Plan: 


At this time due to abdominal surgery he cannot use PD at this time.





11/18/2020-patient gets a peritoneal dialysis at home.  Here in this hospital he

is receiving hemodialysis. pt  Completed hemodialysis session this morning 

without any complications.








(4) Hypertension


Qualifiers: 


   Hypertension type: essential hypertension   Qualified Code(s): I10 - 

Essential (primary) hypertension   


Is this a current diagnosis for this admission?: No   


Plan: 


Patient complains of significant lightheadedness.  He is not exhibiting 

extremely high blood pressure.  He may be dry intravascularly.  I will give 1 L 

of fluid gently and monitor.  Check orthostatic vitals.


11/15/2020-positive orthostatic changes.  Boluses of fluid as above.  Holding 

any antihypertensive medications.


11/16/2020-resting blood pressures are better.  Patient still reports 

lightheadedness with any change in position.  Patient dialyzed today but no 

significant fluid removal.  Continue to monitor.  Patient may still need small 

boluses of fluid.


11/17/2020-blood pressure still soft.  Tachycardia likely indicates hypovolemia.





11/18/2020-blood pressure this morning is 110/60.  Stable.








(5) Anemia in chronic kidney disease (CKD)


Qualifiers: 


   Chronic kidney disease stage: on chronic dialysis   Qualified Code(s): N18.6 

- End stage renal disease; D63.1 - Anemia in chronic kidney disease; Z99.2 - Dep

endence on renal dialysis   


Is this a current diagnosis for this admission?: No   


Plan: 


Continue to monitor CBC


11/15/2020-hemoglobin is 8.7 today.  Continue to monitor.


11/16/2020-hemoglobin improving slowly.  9.2 today.


11/17/2020-recheck labs tomorrow








11/18/2020-patient's hemoglobin today is 7.4.  Plan is to transfuse , 1 unit 

tomorrow during dialysis session.








- Plan Summary


Summary: 


11/17/2020


8:15 PM


Critical care visit


40 minutes


I assessed the patient and then reviewed the use computer records.  I given the 

patient a liter of fluid earlier today.  His blood pressure responded.  He 

admits that he felt better.  This was just prior to the CAT scan.


He still has a large net negative fluid balance.  He has skin tenting and his 

oral mucosa is extremely dry.  He has sunken eye sockets.  His lungs are clear.


After reviewing all of the data including laboratory studies and imaging I 

believe the patient is hypovolemic and this is the reason for the tachycardia.  

In addition, early in his stay he is oliguric.  Of late he has been an uric.


If you compare the heart rate to the blood pressures his blood pressures have 

been relatively soft.


I believe that the patient's blood pressure and pulse are directly related to 

his volume status.  With the nurse at the bedside we discussed the treatment 

plan with the patient.


IV fluid


The patient will receive a 500 mL bolus and then run the remaining fluid at 150 

mL an hour for a total of 2 L of fluid overnight.


Hiccups


The hiccups have responded to the Thorazine.  I was decreasing the Thorazine 

dose.  At this point I will discontinue it as a scheduled medication but leave 

it available as needed every 12 hours for hiccups.  This medication is quite 

sedating and by removing it the patient may have more energy.


Cortisol


Random cortisol drawn early in this hospitalization was 99.  I have ordered a 

morning cortisol for tomorrow in addition to his normal laboratory studies.  I 

will see if there is any evidence of adrenal insufficiency.





- Time


Anticipated Discharge Disposition: Home, Self Care


Anticipated Discharge Timeframe: within 72 hours

## 2020-11-18 NOTE — PDOC PROGRESS REPORT
Subjective


Date:: 11/18/20


Reason For Visit: 


Patient seen today on dialysis.  He still does not look very good.  He has had 

his NG tube clamped and patient still got abdominal pains and feeling 

uncomfortable.  He looks very depressed.  Labs and medications were reviewed.  

Dialysis orders were reviewed with the treating dialysis nurse.Has had a KUB 

done which showed dilated small bowel with air and contrast.  Suggestive of 

partial small bowel obstruction.








Physical Exam


Vital Signs: 


                                        











Temp Pulse Resp BP Pulse Ox


 


 98.3 F   109 H  20   110/52 L  93 


 


 11/18/20 03:51  11/18/20 10:00  11/18/20 10:00  11/18/20 10:00  11/18/20 10:00








                                 Intake & Output











 11/17/20 11/18/20 11/19/20





 06:59 06:59 06:59


 


Intake Total 940 2680 


 


Output Total 1490  


 


Balance -550 2680 


 


Weight 119.2 kg 122.5 kg 











General appearance: PRESENT: disheveled, mild distress, obese


Respiratory exam: PRESENT: clear to auscultation tasha, decreased breath sounds.  

ABSENT: crackles


Cardiovascular exam: PRESENT: +S1, +S2


GI/Abdominal exam: PRESENT: distended, tenderness.  ABSENT: guarding, normal 

bowel sounds, organomegaly


Extremities exam: PRESENT: pedal edema


Neurological exam: PRESENT: alert, awake, oriented to person, oriented to place


Psychiatric exam: PRESENT: depressed


Focused psych exam: ABSENT: psychomotor agitation


Skin exam: ABSENT: cyanosis, jaundice, rash





Results


Laboratory Results: 


                                        





                                 11/18/20 05:05 





                                 11/18/20 05:05 





                                        











  11/18/20 11/18/20





  05:05 05:05


 


WBC  6.4 


 


RBC  2.25 L 


 


Hgb  7.4 L 


 


Hct  21.8 L 


 


MCV  97 


 


MCH  32.7 


 


MCHC  33.9 


 


RDW  15.3 H 


 


Plt Count  287 


 


Seg Neutrophils %  Not Reportable 


 


Sodium   133.0 L


 


Potassium   4.0


 


Chloride   93 L


 


Carbon Dioxide   23


 


Anion Gap   17


 


BUN   72 H


 


Creatinine   8.09 H


 


Est GFR (African Amer)   8 L


 


Glucose   114 H


 


Calcium   7.8 L


 


Magnesium   2.3


 


Total Bilirubin   0.6


 


AST   23


 


Alkaline Phosphatase   128 H


 


Total Protein   4.7 L


 


Albumin   2.4 L











Impressions: 


                                        





Guidance Fluoroscopy  11/09/20 00:00


IMPRESSION:  IMAGE(S) OBTAINED DURING PROCEDURE.


 








Chest X-Ray  11/10/20 00:00


IMPRESSION:


 


1. Stable appearance of the chest.


 








Abdomen/Pelvis CT  11/17/20 00:00


IMPRESSION:


1. NASOGASTRIC TUBE, TIP IN THE STOMACH.  CONTRAST IN THE STOMACH AND THROUGHOUT

THE SMALL BOWEL.  DILATED PROXIMAL SMALL BOWEL AND NONDISTENDED DISTAL SMALL 

BOWEL.  PROBABLY PARTIAL SMALL BOWEL OBSTRUCTION.


2. SMALL UMBILICAL HERNIA, CONTAINING A SMALL AMOUNT OF GAS PRESUMED SECONDARY 

TO RECENT SURGERY.


3. COLONIC DIVERTICULOSIS.  NO CT FINDINGS OF ACUTE DIVERTICULITIS.


4. CHRONIC POLYCYSTIC KIDNEY DISEASE.


5. PERITONEAL DIALYSIS CATHETER, DISTAL END IN THE PELVIS.


6. NO OTHER ACUTE OR SIGNIFICANT FINDINGS.


 








KUB X-Ray  11/18/20 00:00


IMPRESSION:  Persistent dilated at least proximal small bowel.  There is air and

stool as well as contrast throughout the colon.  Findings are consistent with 

partial small bowel obstruction.


 














Assessment & Plan





- Diagnosis


(1) End stage renal disease


Is this a current diagnosis for this admission?: Yes   


Plan: 


Patient initially on peritoneal dialysis but was converted to back up 

hemodialysis through a permacath for now given his recent abdominal surgery.  PD

catheter left behind for later usage.  Patient currently being dialyzed.  Plan 

500-1 l  fluid removal/minimal. His blood pressure is rather tenuous.  Monitor 

closely. Dialysis orders were reviewed with the treating dialysis nurse.








(2) Hypotension


Is this a current diagnosis for this admission?: Yes   


Plan: 


Stable but rather tenuous.  Monitor closely.








(3) Incarcerated umbilical hernia


Is this a current diagnosis for this admission?: Yes   


Plan: 


Status post surgery with no untoward complications.  Being managed by surgery. 

Unfortunately the patient still has not made meaningful recovery of his partial 

bowel obstruction.








(4) Polycystic kidney disease


Is this a current diagnosis for this admission?: Yes   


Plan: 


Status quo.








(5) Anemia in chronic kidney disease (CKD)


Qualifiers: 


   Chronic kidney disease stage: on chronic dialysis   Qualified Code(s): N18.6 

- End stage renal disease; D63.1 - Anemia in chronic kidney disease; Z99.2 - 

Dependence on renal dialysis   


Is this a current diagnosis for this admission?: Yes   


Plan: 


Adjust erythropoietin.  Monitor.

## 2020-11-18 NOTE — PDOC PROGRESS REPORT
Subjective


Date:: 11/18/20


Reason For Visit: 


HYPOXIC RESPIRATORY FAILURE, NEEDS DIALYSIS,








Physical Exam


Vital Signs: 


                                        











Temp Pulse Resp BP Pulse Ox


 


 98.7 F   112 H  20   111/43 L  88 L


 


 11/18/20 15:36  11/18/20 15:36  11/18/20 15:36  11/18/20 15:36  11/18/20 15:36








                                 Intake & Output











 11/17/20 11/18/20 11/19/20





 06:59 06:59 06:59


 


Intake Total 940 2680 


 


Output Total 1490  


 


Balance -550 2680 


 


Weight 119.2 kg 122.5 kg 122.5 kg














Results


Laboratory Results: 


                                        





                                 11/18/20 05:05 





                                 11/18/20 05:05 





                                        











  11/18/20 11/18/20





  05:05 05:05


 


WBC  6.4 


 


RBC  2.25 L 


 


Hgb  7.4 L 


 


Hct  21.8 L 


 


MCV  97 


 


MCH  32.7 


 


MCHC  33.9 


 


RDW  15.3 H 


 


Plt Count  287 


 


Seg Neutrophils %  Not Reportable 


 


Sodium   133.0 L


 


Potassium   4.0


 


Chloride   93 L


 


Carbon Dioxide   23


 


Anion Gap   17


 


BUN   72 H


 


Creatinine   8.09 H


 


Est GFR (African Amer)   8 L


 


Glucose   114 H


 


Calcium   7.8 L


 


Magnesium   2.3


 


Total Bilirubin   0.6


 


AST   23


 


Alkaline Phosphatase   128 H


 


Total Protein   4.7 L


 


Albumin   2.4 L











Impressions: 


                                        





Guidance Fluoroscopy  11/09/20 00:00


IMPRESSION:  IMAGE(S) OBTAINED DURING PROCEDURE.


 








Chest X-Ray  11/10/20 00:00


IMPRESSION:


 


1. Stable appearance of the chest.


 








Abdomen/Pelvis CT  11/17/20 00:00


IMPRESSION:


1. NASOGASTRIC TUBE, TIP IN THE STOMACH.  CONTRAST IN THE STOMACH AND THROUGHOUT

THE SMALL BOWEL.  DILATED PROXIMAL SMALL BOWEL AND NONDISTENDED DISTAL SMALL 

BOWEL.  PROBABLY PARTIAL SMALL BOWEL OBSTRUCTION.


2. SMALL UMBILICAL HERNIA, CONTAINING A SMALL AMOUNT OF GAS PRESUMED SECONDARY 

TO RECENT SURGERY.


3. COLONIC DIVERTICULOSIS.  NO CT FINDINGS OF ACUTE DIVERTICULITIS.


4. CHRONIC POLYCYSTIC KIDNEY DISEASE.


5. PERITONEAL DIALYSIS CATHETER, DISTAL END IN THE PELVIS.


6. NO OTHER ACUTE OR SIGNIFICANT FINDINGS.


 








KUB X-Ray  11/18/20 00:00


IMPRESSION:  Persistent dilated at least proximal small bowel.  There is air and

stool as well as contrast throughout the colon.  Findings are consistent with 

partial small bowel obstruction.


 














Assessment & Plan





- Diagnosis


(1) Small bowel obstruction with strangulation or infarction


Is this a current diagnosis for this admission?: Yes   





- Time


Anticipated Discharge Disposition: Unknown


Anticipated Discharge Timeframe: Unknown





- Plan Summary


Plan Summary: 





70-year-old male admitted with an incarcerated umbilical hernia.  He is status 

post reduction of his hernia, and repair of the abdominal wall.  He reports 

passing flatus, and having a bowel movement today.  He denies any nausea or 

vomiting.  X-rays show air and liquid in the colon, with continued mild dilation

of his small intestine.  I will remove his NG tube today.  Okay for clear 

liquids.  We will monitor closely for signs of continued ileus.  Surgery will 

follow.

## 2020-11-19 NOTE — RADIOLOGY REPORT (SQ)
EXAM DESCRIPTION:  CHEST SINGLE VIEW



IMAGES COMPLETED DATE/TIME:  11/19/2020 6:09 pm



REASON FOR STUDY:  resp distress



COMPARISON:  11/10/2020



EXAM PARAMETERS:  NUMBER OF VIEWS: One view.

TECHNIQUE: Single frontal radiographic view of the chest acquired.

RADIATION DOSE: NA

LIMITATIONS: None.



FINDINGS:  LUNGS AND PLEURA: No opacities, masses or pneumothorax. No pleural effusion.

MEDIASTINUM AND HILAR STRUCTURES: No masses.  Contour normal.

HEART AND VASCULAR STRUCTURES: Heart normal in size.  Normal vasculature.

BONES: No acute findings.

HARDWARE: Right-sided central line and dialysis catheter remain in place.

OTHER: No other significant finding.



IMPRESSION:  NO ACUTE RADIOGRAPHIC FINDING IN THE CHEST.



TECHNICAL DOCUMENTATION:  JOB ID:  [id number]

 2011 [employer]- All Rights Reserved



Reading location - IP/workstation name: [email or web address]

## 2020-11-19 NOTE — PDOC PROGRESS REPORT
Subjective


Date:: 11/19/20


Subjective:: 





11/18/20-KUB was done this morning it indicated of persistent partial small tara

l obstruction.  Patient still have NG tube.  No acute events in the last 24 

hours.  pt completed his dialysis session this morning.








11/19/2020-patient is signed off by surgical team.  NG tube was removed.  

Patient is admitted with incarcerated umbilical hernia status post reduction and

repair of the abdominal wall.  Patient is presently on clear liquids.  To 

advance the diet as tolerated.  Hemoglobin is 7.5 because the patient is on 

transplant list to hold off on transfusion.


Reason For Visit: 


HYPOXIC RESPIRATORY FAILURE, NEEDS DIALYSIS,








Physical Exam


Vital Signs: 


                                        











Temp Pulse Resp BP Pulse Ox


 


 98.0 F   95   20   118/51 L  89 L


 


 11/19/20 07:50  11/19/20 07:50  11/19/20 07:50  11/19/20 07:50  11/19/20 07:50








                                 Intake & Output











 11/18/20 11/19/20 11/20/20





 06:59 06:59 06:59


 


Intake Total 2680 100 


 


Output Total  400 


 


Balance 2680 -300 


 


Weight 122.5 kg 124.2 kg 











General appearance: PRESENT: no acute distress, morbidly obese


Head exam: PRESENT: atraumatic


Eye exam: PRESENT: PERRLA


Mouth exam: PRESENT: moist, neck supple, tongue midline


Teeth exam: PRESENT: poor dentation


Neck exam: ABSENT: carotid bruit, JVD, lymphadenopathy, thyromegaly


Respiratory exam: PRESENT: decreased breath sounds


Cardiovascular exam: PRESENT: RRR.  ABSENT: diastolic murmur, rubs, systolic 

murmur


GI/Abdominal exam: PRESENT: normal bowel sounds, soft.  ABSENT: distended, 

guarding, mass, organolmegaly, rebound, tenderness


Rectal exam: PRESENT: deferred


Extremities exam: PRESENT: full ROM.  ABSENT: calf tenderness, clubbing, pedal 

edema


Neurological exam: PRESENT: alert, awake, oriented to person, oriented to place,

oriented to time, oriented to situation, CN II-XII grossly intact.  ABSENT: 

motor sensory deficit


Psychiatric exam: PRESENT: appropriate affect, normal mood.  ABSENT: homicidal 

ideation, suicidal ideation





Results


Laboratory Results: 


                                        





                                 11/19/20 06:30 





                                 11/19/20 06:30 





                                        











  11/19/20 11/19/20





  06:30 06:30


 


WBC  5.9 


 


RBC  2.32 L 


 


Hgb  7.5 L 


 


Hct  22.5 L 


 


MCV  97 


 


MCH  32.3 


 


MCHC  33.5 


 


RDW  15.1 H 


 


Plt Count  306 


 


Seg Neutrophils %  83.7 H 


 


Sodium   133.7 L


 


Potassium   4.0


 


Chloride   96 L


 


Carbon Dioxide   27


 


Anion Gap   11


 


BUN   41 H


 


Creatinine   5.85 H


 


Est GFR (African Amer)   12 L


 


Glucose   108


 


Calcium   7.9 L


 


Magnesium   2.1


 


Total Bilirubin   0.6


 


AST   34


 


Alkaline Phosphatase   172 H


 


Total Protein   4.8 L


 


Albumin   2.4 L











Impressions: 


                                        





Guidance Fluoroscopy  11/09/20 00:00


IMPRESSION:  IMAGE(S) OBTAINED DURING PROCEDURE.


 








Chest X-Ray  11/10/20 00:00


IMPRESSION:


 


1. Stable appearance of the chest.


 








Abdomen/Pelvis CT  11/17/20 00:00


IMPRESSION:


1. NASOGASTRIC TUBE, TIP IN THE STOMACH.  CONTRAST IN THE STOMACH AND THROUGHOUT

THE SMALL BOWEL.  DILATED PROXIMAL SMALL BOWEL AND NONDISTENDED DISTAL SMALL 

BOWEL.  PROBABLY PARTIAL SMALL BOWEL OBSTRUCTION.


2. SMALL UMBILICAL HERNIA, CONTAINING A SMALL AMOUNT OF GAS PRESUMED SECONDARY 

TO RECENT SURGERY.


3. COLONIC DIVERTICULOSIS.  NO CT FINDINGS OF ACUTE DIVERTICULITIS.


4. CHRONIC POLYCYSTIC KIDNEY DISEASE.


5. PERITONEAL DIALYSIS CATHETER, DISTAL END IN THE PELVIS.


6. NO OTHER ACUTE OR SIGNIFICANT FINDINGS.


 








KUB X-Ray  11/18/20 00:00


IMPRESSION:  Persistent dilated at least proximal small bowel.  There is air and

stool as well as contrast throughout the colon.  Findings are consistent with 

partial small bowel obstruction.


 














Assessment and Plan





- Diagnosis


(1) Ileus, postoperative


Is this a current diagnosis for this admission?: Yes   


Plan: 


resolved.  Will advance diet to a dialysis diet.  Surgery service signing off.  

Please call us for any problems or concerns.  Will defer to nephrology for when 

to resume his peritoneal dialysis.  Would wait at least a couple more weeks to 

allow sufficient wound healing however.








11/19/20-patient admitted for incarcerated inguinal hernia status post reduction

and abdominal wall repair.  Surgery signed off for today.  Patient is without NG

tube and is on clear liquids.  To continue to closely monitor the patient 

condition advance the diet if needed.








(2) Strangulated umbilical hernia


Is this a current diagnosis for this admission?: Yes   


Plan: 


Surgical repair completed.  Postop care per surgery service








11/18/2020-patient admitted with strangulated umbilical hernia status post 

surgery.  Further management as per surgical team.








11/19/2020-patient admitted with a strangulated umbilical hernia, status post 

reduction, abdominal wall repair.  Surgery signed off .








(3) ESRD on peritoneal dialysis


Is this a current diagnosis for this admission?: No   


Plan: 


At this time due to abdominal surgery he cannot use PD at this time.





11/18/2020-patient gets a peritoneal dialysis at home.  Here in this hospital he

is receiving hemodialysis. pt  Completed hemodialysis session this morning 

without any complications.








(4) Hypertension


Qualifiers: 


   Hypertension type: essential hypertension   Qualified Code(s): I10 - 

Essential (primary) hypertension   


Is this a current diagnosis for this admission?: No   


Plan: 


Patient complains of significant lightheadedness.  He is not exhibiting 

extremely high blood pressure.  He may be dry intravascularly.  I will give 1 L 

of fluid gently and monitor.  Check orthostatic vitals.


11/15/2020-positive orthostatic changes.  Boluses of fluid as above.  Holding 

any antihypertensive medications.


11/16/2020-resting blood pressures are better.  Patient still reports 

lightheadedness with any change in position.  Patient dialyzed today but no 

significant fluid removal.  Continue to monitor.  Patient may still need small 

boluses of fluid.


11/17/2020-blood pressure still soft.  Tachycardia likely indicates hypovolemia.





11/18/2020-blood pressure this morning is 110/60.  Stable.





11/19/2020-blood pressure today is 100/50.  Stable.








(5) Anemia in chronic kidney disease (CKD)


Qualifiers: 


   Chronic kidney disease stage: on chronic dialysis   Qualified Code(s): N18.6 

- End stage renal disease; D63.1 - Anemia in chronic kidney disease; Z99.2 - 

Dependence on renal dialysis   


Is this a current diagnosis for this admission?: No   


Plan: 


Continue to monitor CBC


11/15/2020-hemoglobin is 8.7 today.  Continue to monitor.


11/16/2020-hemoglobin improving slowly.  9.2 today.


11/17/2020-recheck labs tomorrow








11/18/2020-patient's hemoglobin today is 7.4.  Plan is to transfuse , 1 unit 

tomorrow during dialysis session.








11/19/2020-hemoglobin 7.5.  To hold off on blood transfusions because patient is

on transplantation list.








- Plan Summary


Summary: 


11/17/2020


8:15 PM


Critical care visit


40 minutes


I assessed the patient and then reviewed the use computer records.  I given the 

patient a liter of fluid earlier today.  His blood pressure responded.  He 

admits that he felt better.  This was just prior to the CAT scan.


He still has a large net negative fluid balance.  He has skin tenting and his 

oral mucosa is extremely dry.  He has sunken eye sockets.  His lungs are clear.


After reviewing all of the data including laboratory studies and imaging I 

believe the patient is hypovolemic and this is the reason for the tachycardia.  

In addition, early in his stay he is oliguric.  Of late he has been an uric.


If you compare the heart rate to the blood pressures his blood pressures have 

been relatively soft.


I believe that the patient's blood pressure and pulse are directly related to 

his volume status.  With the nurse at the bedside we discussed the treatment 

plan with the patient.


IV fluid


The patient will receive a 500 mL bolus and then run the remaining fluid at 150 

mL an hour for a total of 2 L of fluid overnight.


Hiccups


The hiccups have responded to the Thorazine.  I was decreasing the Thorazine 

dose.  At this point I will discontinue it as a scheduled medication but leave 

it available as needed every 12 hours for hiccups.  This medication is quite 

sedating and by removing it the patient may have more energy.


Cortisol


Random cortisol drawn early in this hospitalization was 99.  I have ordered a 

morning cortisol for tomorrow in addition to his normal laboratory studies.  I 

will see if there is any evidence of adrenal insufficiency.





- Time


Anticipated Discharge Disposition: Home, Self Care


Anticipated Discharge Timeframe: within 48 hours

## 2020-11-19 NOTE — PDOC PROGRESS REPORT
Subjective


Date:: 11/19/20


Subjective:: 





Had bowel movements.  No nausea or vomiting.  Tolerating liquid diet well.  Feel

s much better.


Reason For Visit: 


HYPOXIC RESPIRATORY FAILURE, NEEDS DIALYSIS,








Physical Exam


Vital Signs: 


                                        











Temp Pulse Resp BP Pulse Ox


 


 98.0 F   95   20   118/51 L  89 L


 


 11/19/20 07:50  11/19/20 07:50  11/19/20 07:50  11/19/20 07:50  11/19/20 07:50








                                 Intake & Output











 11/18/20 11/19/20 11/20/20





 06:59 06:59 06:59


 


Intake Total 2680 100 


 


Output Total  400 


 


Balance 2680 -300 


 


Weight 122.5 kg 124.2 kg 











General appearance: PRESENT: no acute distress, cooperative


Respiratory exam: PRESENT: clear to auscultation tasha


Cardiovascular exam: PRESENT: RRR


GI/Abdominal exam: PRESENT: other - Soft, nondistended, nontender to palpation. 

Wound clean dry and intact.  Active bowel sounds.





Results


Laboratory Results: 


                                        





                                 11/19/20 06:30 





                                 11/19/20 06:30 





                                        











  11/19/20 11/19/20





  06:30 06:30


 


WBC  5.9 


 


RBC  2.32 L 


 


Hgb  7.5 L 


 


Hct  22.5 L 


 


MCV  97 


 


MCH  32.3 


 


MCHC  33.5 


 


RDW  15.1 H 


 


Plt Count  306 


 


Seg Neutrophils %  83.7 H 


 


Sodium   133.7 L


 


Potassium   4.0


 


Chloride   96 L


 


Carbon Dioxide   27


 


Anion Gap   11


 


BUN   41 H


 


Creatinine   5.85 H


 


Est GFR (African Amer)   12 L


 


Glucose   108


 


Calcium   7.9 L


 


Magnesium   2.1


 


Total Bilirubin   0.6


 


AST   34


 


Alkaline Phosphatase   172 H


 


Total Protein   4.8 L


 


Albumin   2.4 L











Impressions: 


                                        





Guidance Fluoroscopy  11/09/20 00:00


IMPRESSION:  IMAGE(S) OBTAINED DURING PROCEDURE.


 








Chest X-Ray  11/10/20 00:00


IMPRESSION:


 


1. Stable appearance of the chest.


 








Abdomen/Pelvis CT  11/17/20 00:00


IMPRESSION:


1. NASOGASTRIC TUBE, TIP IN THE STOMACH.  CONTRAST IN THE STOMACH AND THROUGHOUT

THE SMALL BOWEL.  DILATED PROXIMAL SMALL BOWEL AND NONDISTENDED DISTAL SMALL BOW

EL.  PROBABLY PARTIAL SMALL BOWEL OBSTRUCTION.


2. SMALL UMBILICAL HERNIA, CONTAINING A SMALL AMOUNT OF GAS PRESUMED SECONDARY 

TO RECENT SURGERY.


3. COLONIC DIVERTICULOSIS.  NO CT FINDINGS OF ACUTE DIVERTICULITIS.


4. CHRONIC POLYCYSTIC KIDNEY DISEASE.


5. PERITONEAL DIALYSIS CATHETER, DISTAL END IN THE PELVIS.


6. NO OTHER ACUTE OR SIGNIFICANT FINDINGS.


 








KUB X-Ray  11/18/20 00:00


IMPRESSION:  Persistent dilated at least proximal small bowel.  There is air and

stool as well as contrast throughout the colon.  Findings are consistent with 

partial small bowel obstruction.


 














Assessment & Plan





- Diagnosis


(1) Ileus, postoperative


Is this a current diagnosis for this admission?: Yes   


Plan: 


resolved.  Will advance diet to a dialysis diet.  Surgery service signing off.  

Please call us for any problems or concerns.  Will defer to nephrology for when 

to resume his peritoneal dialysis.  Would wait at least a couple more weeks to 

allow sufficient wound healing however.








- Time


Anticipated Discharge Disposition: Home with Home Health


Anticipated Discharge Timeframe: within 72 hours

## 2020-11-20 NOTE — PDOC PROGRESS REPORT
Subjective


Date:: 11/20/20


Reason For Visit: 


Patient was seen today while undergoing dialysis.  He is undergoing dialysis but

is complaining of severe leg cramps which started yesterday.  His abdominal 

pains is a whole lot better and his NG tube is out and he has been begun on 

clear fluids which he seems to be tolerating.  No complaints of any nausea 

vomiting.  He says overall he feels better but for the fact that his cramps are 

really bad and would like to get some immediate treatment.  He is undergoing 

dialysis without any issues through his left IJ catheter/permacath.  He said to 

me today that he has had a lot of time to think about and he has come to the 

realization that he wants to stop peritoneal dialysis as he cannot keep on doing

this at home given his current situation and circumstances.  Therefore, he would

like to have his PD catheter removed prior to discharge. He would like to be 

referred for AV fistula once he is discharged and converted to permanent 

hemodialysis and have his IJ PermCath removed at a later date.





Labs and medications were reviewed.  Dialysis orders were reviewed with the tr

eating dialysis nurse.





Physical Exam


Vital Signs: 


                                        











Temp Pulse Resp BP Pulse Ox


 


 98.1 F   99   14   130/65 H  99 


 


 11/20/20 10:00  11/20/20 07:00  11/19/20 22:46  11/19/20 22:46  11/19/20 22:46








                                 Intake & Output











 11/19/20 11/20/20 11/21/20





 06:59 06:59 06:59


 


Intake Total 100 240 


 


Output Total 400  


 


Balance -300 240 


 


Weight 124.2 kg 124.2 kg 











General appearance: PRESENT: no acute distress, disheveled


Respiratory exam: PRESENT: clear to auscultation tasha, decreased breath sounds.  

ABSENT: crackles


Cardiovascular exam: PRESENT: +S1, +S2


GI/Abdominal exam: PRESENT: distended, normal bowel sounds, tenderness.  ABSENT:

guarding, organomegaly


Extremities exam: ABSENT: pedal edema


Neurological exam: PRESENT: alert, awake, oriented to person, oriented to place


Psychiatric exam: PRESENT: anxious


Skin exam: ABSENT: jaundice, mottled, rash





Results


Laboratory Results: 


                                        





                                 11/20/20 05:13 





                                 11/20/20 05:13 





                                        











  11/20/20 11/20/20





  05:13 05:13


 


WBC  5.5 


 


RBC  2.31 L 


 


Hgb  7.5 L 


 


Hct  22.2 L 


 


MCV  96 


 


MCH  32.3 


 


MCHC  33.7 


 


RDW  15.2 H 


 


Plt Count  340 


 


Seg Neutrophils %  81.1 H 


 


Sodium   132.8 L


 


Potassium   4.6


 


Chloride   95 L


 


Carbon Dioxide   26


 


Anion Gap   12


 


BUN   56 H


 


Creatinine   7.68 H


 


Est GFR (African Amer)   9 L


 


Glucose   111 H


 


Calcium   7.7 L


 


Magnesium   2.1


 


Total Bilirubin   0.6


 


AST   40


 


Alkaline Phosphatase   173 H


 


Total Protein   4.7 L


 


Albumin   2.4 L








                                        











  11/19/20 11/19/20





  20:10 20:10


 


Creatine Kinase  39 L 


 


Troponin I   0.050











Impressions: 


                                        





Guidance Fluoroscopy  11/09/20 00:00


IMPRESSION:  IMAGE(S) OBTAINED DURING PROCEDURE.


 








Abdomen/Pelvis CT  11/17/20 00:00


IMPRESSION:


1. NASOGASTRIC TUBE, TIP IN THE STOMACH.  CONTRAST IN THE STOMACH AND THROUGHOUT

THE SMALL BOWEL.  DILATED PROXIMAL SMALL BOWEL AND NONDISTENDED DISTAL SMALL 

BOWEL.  PROBABLY PARTIAL SMALL BOWEL OBSTRUCTION.


2. SMALL UMBILICAL HERNIA, CONTAINING A SMALL AMOUNT OF GAS PRESUMED SECONDARY 

TO RECENT SURGERY.


3. COLONIC DIVERTICULOSIS.  NO CT FINDINGS OF ACUTE DIVERTICULITIS.


4. CHRONIC POLYCYSTIC KIDNEY DISEASE.


5. PERITONEAL DIALYSIS CATHETER, DISTAL END IN THE PELVIS.


6. NO OTHER ACUTE OR SIGNIFICANT FINDINGS.


 








KUB X-Ray  11/18/20 00:00


IMPRESSION:  Persistent dilated at least proximal small bowel.  There is air and

stool as well as contrast throughout the colon.  Findings are consistent with 

partial small bowel obstruction.


 








Chest X-Ray  11/19/20 00:00


IMPRESSION:  NO ACUTE RADIOGRAPHIC FINDING IN THE CHEST.


 














Assessment & Plan





- Diagnosis


(1) End stage renal disease


Is this a current diagnosis for this admission?: Yes   


Plan: 


Patient initially on peritoneal dialysis but was converted to back up 

hemodialysis through a permacath for now given his recent abdominal surgery.  PD

catheter left behind for later usage.  Patient currently being dialyzed.  Plan 

500-1 l  fluid removal/minimal.  Monitor closely. Dialysis orders were reviewed 

with the treating dialysis nurse.





As mentioned earlier patient after long thoughts, has come to the realization he

does not want to continue peritoneal dialysis at home and wants to be converted 

to permanent hemodialysis.  However I convinced him that he should think about 

this further before removing his PD catheter as he is requesting as I think he 

might not enjoy hemodialysis as an outpatient especially if he is going to be 

cramping while undergoing dialysis.  Therefore I suggested that he leave his PD 

catheter alone for now and continue getting hemodialysis through his left IJ 

catheter over the next month or so and then make a more thoughtful decision at 

that time to which option he would like to pursue.  He seems to accept that line

of thinking and agreeable to leave his PD catheter alone.








(2) Hypotension


Is this a current diagnosis for this admission?: Yes   


Plan: 


Resolved.  Monitor.








(3) Incarcerated umbilical hernia


Is this a current diagnosis for this admission?: Yes   


Plan: 


Status post surgery . Complicated by postop ileus and he seems to have recovered

from that as he seems to be tolerating clear liquids for now.








(4) Polycystic kidney disease


Is this a current diagnosis for this admission?: Yes   


Plan: 


Status quo.








(5) Anemia in chronic kidney disease (CKD)


Qualifiers: 


   Chronic kidney disease stage: on chronic dialysis   Qualified Code(s): N18.6 

- End stage renal disease; D63.1 - Anemia in chronic kidney disease; Z99.2 - 

Dependence on renal dialysis   


Is this a current diagnosis for this admission?: No   


Plan: 


Adjust erythropoietin.At this point if his hemoglobin stays below 8 I would 

recommend he getting a pint of blood transfusion  Monitor.








(6) Leg cramps


Plan: 


Has not responded to conservative measures including Tylenol.  Will order 1 mg 

of IV morphine.  Orders placed.

## 2020-11-20 NOTE — PDOC PROGRESS REPORT
Subjective


Date:: 11/20/20


Subjective:: 





11/18/20-KUB was done this morning it indicated of persistent partial small tara

l obstruction.  Patient still have NG tube.  No acute events in the last 24 

hours.  pt completed his dialysis session this morning.








11/19/2020-patient is signed off by surgical team.  NG tube was removed.  

Patient is admitted with incarcerated umbilical hernia status post reduction and

repair of the abdominal wall.  Patient is presently on clear liquids.  To 

advance the diet as tolerated.  Hemoglobin is 7.5 because the patient is on 

transplant list to hold off on transfusion.





11/20/2020-patient is in the dialysis unit tolerating the dialysis very well.  

Complaining of leg cramps.  Yesterday complaints of shortness of breath started 

on neb treatments and chest x-ray was normal.


Reason For Visit: 


HYPOXIC RESPIRATORY FAILURE, NEEDS DIALYSIS,








Physical Exam


Vital Signs: 


                                        











Temp Pulse Resp BP Pulse Ox


 


 98.1 F   99   14   130/65 H  99 


 


 11/19/20 22:46  11/20/20 07:00  11/19/20 22:46  11/19/20 22:46  11/19/20 22:46








                                 Intake & Output











 11/19/20 11/20/20 11/21/20





 06:59 06:59 06:59


 


Intake Total 100 240 


 


Output Total 400  


 


Balance -300 240 


 


Weight 124.2 kg 124.2 kg 











General appearance: PRESENT: no acute distress, morbidly obese


Head exam: PRESENT: atraumatic


Eye exam: PRESENT: PERRLA


Mouth exam: PRESENT: moist, tongue midline


Teeth exam: PRESENT: poor dentation


Neck exam: ABSENT: carotid bruit, JVD, lymphadenopathy, thyromegaly


Respiratory exam: PRESENT: decreased breath sounds


Cardiovascular exam: PRESENT: RRR.  ABSENT: diastolic murmur, rubs, systolic 

murmur


GI/Abdominal exam: PRESENT: normal bowel sounds, soft.  ABSENT: distended, 

guarding, mass, organolmegaly, rebound, tenderness


Rectal exam: PRESENT: deferred


Extremities exam: PRESENT: full ROM.  ABSENT: calf tenderness, clubbing, pedal 

edema


Neurological exam: PRESENT: alert, awake, oriented to person, oriented to place,

oriented to time, oriented to situation, CN II-XII grossly intact.  ABSENT: 

motor sensory deficit


Psychiatric exam: PRESENT: appropriate affect, normal mood.  ABSENT: homicidal 

ideation, suicidal ideation





Results


Laboratory Results: 


                                        





                                 11/20/20 05:13 





                                 11/20/20 05:13 





                                        











  11/20/20 11/20/20





  05:13 05:13


 


WBC  5.5 


 


RBC  2.31 L 


 


Hgb  7.5 L 


 


Hct  22.2 L 


 


MCV  96 


 


MCH  32.3 


 


MCHC  33.7 


 


RDW  15.2 H 


 


Plt Count  340 


 


Seg Neutrophils %  81.1 H 


 


Sodium   132.8 L


 


Potassium   4.6


 


Chloride   95 L


 


Carbon Dioxide   26


 


Anion Gap   12


 


BUN   56 H


 


Creatinine   7.68 H


 


Est GFR (African Amer)   9 L


 


Glucose   111 H


 


Calcium   7.7 L


 


Magnesium   2.1


 


Total Bilirubin   0.6


 


AST   40


 


Alkaline Phosphatase   173 H


 


Total Protein   4.7 L


 


Albumin   2.4 L








                                        











  11/19/20 11/19/20





  20:10 20:10


 


Creatine Kinase  39 L 


 


Troponin I   0.050











Impressions: 


                                        





Guidance Fluoroscopy  11/09/20 00:00


IMPRESSION:  IMAGE(S) OBTAINED DURING PROCEDURE.


 








Abdomen/Pelvis CT  11/17/20 00:00


IMPRESSION:


1. NASOGASTRIC TUBE, TIP IN THE STOMACH.  CONTRAST IN THE STOMACH AND THROUGHOUT

THE SMALL BOWEL.  DILATED PROXIMAL SMALL BOWEL AND NONDISTENDED DISTAL SMALL 

BOWEL.  PROBABLY PARTIAL SMALL BOWEL OBSTRUCTION.


2. SMALL UMBILICAL HERNIA, CONTAINING A SMALL AMOUNT OF GAS PRESUMED SECONDARY 

TO RECENT SURGERY.


3. COLONIC DIVERTICULOSIS.  NO CT FINDINGS OF ACUTE DIVERTICULITIS.


4. CHRONIC POLYCYSTIC KIDNEY DISEASE.


5. PERITONEAL DIALYSIS CATHETER, DISTAL END IN THE PELVIS.


6. NO OTHER ACUTE OR SIGNIFICANT FINDINGS.


 








KUB X-Ray  11/18/20 00:00


IMPRESSION:  Persistent dilated at least proximal small bowel.  There is air and

stool as well as contrast throughout the colon.  Findings are consistent with pa

rtial small bowel obstruction.


 








Chest X-Ray  11/19/20 00:00


IMPRESSION:  NO ACUTE RADIOGRAPHIC FINDING IN THE CHEST.


 














Assessment and Plan





- Diagnosis


(1) Ileus, postoperative


Is this a current diagnosis for this admission?: Yes   


Plan: 


resolved.  Will advance diet to a dialysis diet.  Surgery service signing off.  

Please call us for any problems or concerns.  Will defer to nephrology for when 

to resume his peritoneal dialysis.  Would wait at least a couple more weeks to 

allow sufficient wound healing however.








11/19/20-patient admitted for incarcerated inguinal hernia status post reduction

and abdominal wall repair.  Surgery signed off for today.  Patient is without NG

tube and is on clear liquids.  To continue to closely monitor the patient 

condition advance the diet if needed.








11/20/2020-patient admitted with incarcerated inguinal hernia status post 

reduction, abdominal wall repair.  Surgery signed off on him.  Patient is 

receiving successful dialysis.  I talked to him about nursing home placement he 

got very upset and he said he can hire a nurse to help him at home.








(2) Strangulated umbilical hernia


Is this a current diagnosis for this admission?: Yes   


Plan: 


Surgical repair completed.  Postop care per surgery service








11/18/2020-patient admitted with strangulated umbilical hernia status post 

surgery.  Further management as per surgical team.








11/19/2020-patient admitted with a strangulated umbilical hernia, status post 

reduction, abdominal wall repair.  Surgery signed off .








11/20/2020-patient admitted with strangulated umbilical hernia, status post 

reduction, abdominal wall repair.  Doing well.








(3) ESRD on peritoneal dialysis


Is this a current diagnosis for this admission?: No   


Plan: 


At this time due to abdominal surgery he cannot use PD at this time.





11/18/2020-patient gets a peritoneal dialysis at home.  Here in this hospital he

is receiving hemodialysis. pt  Completed hemodialysis session this morning 

without any complications.





11/19/2020-patient is receiving dialysis at this time.  Usually he does 

peritoneal dialysis at home.  Receiving Procrit injections.  Latest hemoglobin 

is 7.5.








(4) Hypertension


Qualifiers: 


   Hypertension type: essential hypertension   Qualified Code(s): I10 - 

Essential (primary) hypertension   


Is this a current diagnosis for this admission?: No   


Plan: 


Patient complains of significant lightheadedness.  He is not exhibiting ext

remely high blood pressure.  He may be dry intravascularly.  I will give 1 L of 

fluid gently and monitor.  Check orthostatic vitals.


11/15/2020-positive orthostatic changes.  Boluses of fluid as above.  Holding 

any antihypertensive medications.


11/16/2020-resting blood pressures are better.  Patient still reports 

lightheadedness with any change in position.  Patient dialyzed today but no 

significant fluid removal.  Continue to monitor.  Patient may still need small 

boluses of fluid.


11/17/2020-blood pressure still soft.  Tachycardia likely indicates hypovolemia.





11/18/2020-blood pressure this morning is 110/60.  Stable.





11/19/2020-blood pressure today is 100/50.  Stable.








(5) Anemia in chronic kidney disease (CKD)


Qualifiers: 


   Chronic kidney disease stage: on chronic dialysis   Qualified Code(s): N18.6 

- End stage renal disease; D63.1 - Anemia in chronic kidney disease; Z99.2 - 

Dependence on renal dialysis   


Is this a current diagnosis for this admission?: No   


Plan: 


Continue to monitor CBC


11/15/2020-hemoglobin is 8.7 today.  Continue to monitor.


11/16/2020-hemoglobin improving slowly.  9.2 today.


11/17/2020-recheck labs tomorrow








11/18/2020-patient's hemoglobin today is 7.4.  Plan is to transfuse , 1 unit 

tomorrow during dialysis session.








11/19/2020-hemoglobin 7.5.  To hold off on blood transfusions because patient is

on transplantation list.








11/20/2020-latest hemoglobin 7.5.  Receiving Procrit injections during the 

dialysis.  Patient is in the list for heart transplant and [place] so it is 

always better to hold off on blood transfusions unless is an emergency.








- Plan Summary


Summary: 


11/17/2020


8:15 PM


Critical care visit


40 minutes


I assessed the patient and then reviewed the use computer records.  I given the 

patient a liter of fluid earlier today.  His blood pressure responded.  He 

admits that he felt better.  This was just prior to the CAT scan.


He still has a large net negative fluid balance.  He has skin tenting and his 

oral mucosa is extremely dry.  He has sunken eye sockets.  His lungs are clear.


After reviewing all of the data including laboratory studies and imaging I 

believe the patient is hypovolemic and this is the reason for the tachycardia.  

In addition, early in his stay he is oliguric.  Of late he has been an uric.


If you compare the heart rate to the blood pressures his blood pressures have 

been relatively soft.


I believe that the patient's blood pressure and pulse are directly related to 

his volume status.  With the nurse at the bedside we discussed the treatment 

plan with the patient.


IV fluid


The patient will receive a 500 mL bolus and then run the remaining fluid at 150 

mL an hour for a total of 2 L of fluid overnight.


Hiccups


The hiccups have responded to the Thorazine.  I was decreasing the Thorazine 

dose.  At this point I will discontinue it as a scheduled medication but leave 

it available as needed every 12 hours for hiccups.  This medication is quite 

sedating and by removing it the patient may have more energy.


Cortisol


Random cortisol drawn early in this hospitalization was 99.  I have ordered a 

morning cortisol for tomorrow in addition to his normal laboratory studies.  I 

will see if there is any evidence of adrenal insufficiency.





- Time


Anticipated Discharge Disposition: Home with Home Health


Anticipated Discharge Timeframe: within 48 hours

## 2020-11-21 NOTE — RADIOLOGY REPORT (SQ)
EXAM DESCRIPTION:  CHEST SINGLE VIEW



IMAGES COMPLETED DATE/TIME:  11/21/2020 6:42 pm



REASON FOR STUDY:  STROKE ALERT



COMPARISON:  11/19/2020



TECHNIQUE:  Single frontal radiographic view of the chest acquired.



NUMBER OF VIEWS:  One view.



LIMITATIONS:  Mild RPO positioning



FINDINGS:  LUNGS AND PLEURA: No pneumothorax. No consolidation or pleural effusion.

MEDIASTINUM AND HILAR STRUCTURES: Stable.

HEART AND VASCULAR STRUCTURES: Stable.

BONES: No acute findings.

HARDWARE: Left subclavian and right IJ central venous catheters appear in stable position.

OTHER: No other significant finding.



IMPRESSION:  NO ACUTE FINDINGS.



TECHNICAL DOCUMENTATION:  JOB ID:  [id number]

[id number]

 2011 [employer]- All Rights Reserved



Reading location - IP/workstation name: [email or web address]

## 2020-11-21 NOTE — RADIOLOGY REPORT (SQ)
EXAM DESCRIPTION:  CT HEAD WITHOUT



IMAGES COMPLETED DATE/TIME:  11/21/2020 5:53 pm



REASON FOR STUDY:  AMS



COMPARISON:  None.



TECHNIQUE:  Axial images acquired through the brain without intravenous contrast. Images reviewed wit
h bone, brain and subdural windows.  Images stored on PACS.

All CT scanners at this facility use dose modulation, iterative reconstruction, and/or weight based d
osing when appropriate to reduce radiation dose to as low as reasonably achievable (ALARA).

[facility]: Dose Right  [facility]: CareDose    [facility]: Dose Right    [facility]: Teradose 4D    [facility]: Smart [employer] [facility]



RADIATION DOSE:  CT Rad equipment meets quality standard of care and radiation dose reduction techniq
ues were employed. CTDIvol: 53.2 mGy. DLP: 1124 mGy-cm..



LIMITATIONS:  None.



FINDINGS:  VENTRICLES: Normal size and contour.

CEREBRUM: No masses. No hemorrhage. No midline shift. Age appropriate white matter. No evidence for a
cute infarction.

CEREBELLUM: No masses. No hemorrhage. No alteration of density. No evidence for acute infarction.

EXTRA-AXIAL SPACES: No fluid collections.

ORBITS AND GLOBE: No intra- or extraconal masses. Normal contour of globe without masses.

CALVARIUM: No fracture.

PARANASAL SINUSES: No fluid or mucosal thickening.

SOFT TISSUES: No mass or hematoma.

OTHER: No other significant finding.



IMPRESSION:  NO ACUTE INTRACRANIAL FINDINGS.

EVIDENCE OF ACUTE STROKE: NO.



COMMENT:   The findings were sent to the Radiology Results Communication Center at 18:05 on 11/21/202
0 to be communicated to a licensed caregiver.



TECHNICAL DOCUMENTATION:  JOB ID:  [id number]

[id number]

Quality ID # [id number]: Final reports with documentation of one or more dose reduction techniques (e.g., Au
tomated exposure control, adjustment of the mA and/or kV according to patient size, use of iterative 
reconstruction technique)

 2011 [employer]- All Rights Reserved



Reading location - IP/workstation name: [email or web address]

## 2020-11-21 NOTE — PDOC PROGRESS REPORT
Subjective


Date:: 11/21/20


Subjective:: 


As per admitting physician's note  ZAKIA GOODWIN is a 70 year old male, 

with history of end-stage renal disease on peritoneal dialysis (last session 

last night), Hx Hypercholesterolemia, Hx Hypertension, history of back surgery 

who has a 1 day complaining of mid abdominal pain around the umbilicus.  The 

patient has a known umbilical hernia that he has been following conservatively. 

A CT scan abdomen pelvis has been done upon arrival in the emergency room today 

and this is significant for a strangulated umbilical hernia with infarcted small

bowel and gas in the portal vein system.





11/21/2020.  No acute events overnight.  Patient awake and alert and cooperative

with physical examination.  Stating that he is avoiding eating or drinking as he

has odynophagia due to prolonged NG tube placement, patient was noted to be 

hypotensive and is stating that when he was trying to get up to do his physical 

therapy he was feeling lightheaded, denies any chest pain, nausea, vomiting, 

diarrhea, constipation or any urinary symptoms.


Reason For Visit: 


HYPOXIC RESPIRATORY FAILURE, NEEDS DIALYSIS,








Physical Exam


Vital Signs: 


                                        











Temp Pulse Resp BP Pulse Ox


 


 98.3 F   100   19   115/51 L  93 


 


 11/21/20 09:58  11/21/20 08:30  11/21/20 08:30  11/21/20 08:30  11/21/20 08:30








                                 Intake & Output











 11/20/20 11/21/20 11/22/20





 06:59 06:59 06:59


 


Intake Total 240 475 


 


Output Total  500 


 


Balance 240 -25 


 


Weight 124.2 kg 121.6 kg 











General appearance: PRESENT: no acute distress, obese, well-developed, well-

nourished


Head exam: PRESENT: atraumatic, normocephalic


Respiratory exam: PRESENT: clear to auscultation tasha.  ABSENT: rales, rhonchi, 

wheezes


Cardiovascular exam: PRESENT: RRR.  ABSENT: diastolic murmur, rubs, systolic 

murmur


GI/Abdominal exam: PRESENT: normal bowel sounds, soft, other - Surgical wound 

looks clean, no sign of discharge..  ABSENT: distended, guarding, mass, 

organolmegaly, rebound, tenderness


Neurological exam: PRESENT: alert, awake, oriented to person, oriented to place,

oriented to time, oriented to situation, CN II-XII grossly intact.  ABSENT: 

motor sensory deficit





Results


Laboratory Results: 


                                        





                                 11/21/20 05:35 





                                 11/21/20 05:35 





                                        











  11/21/20 11/21/20





  05:35 05:35


 


WBC  4.2 


 


RBC  2.19 L 


 


Hgb  7.1 L 


 


Hct  21.1 L 


 


MCV  96 


 


MCH  32.2 


 


MCHC  33.6 


 


RDW  15.2 H 


 


Plt Count  340 


 


Sodium   134.8 L


 


Potassium   4.2


 


Chloride   97 L


 


Carbon Dioxide   28


 


Anion Gap   10


 


BUN   36 H


 


Creatinine   5.55 H


 


Est GFR (African Amer)   12 L


 


Glucose   107


 


Calcium   7.7 L


 


Magnesium   2.0








                                        











  11/19/20 11/19/20





  20:10 20:10


 


Creatine Kinase  39 L 


 


Troponin I   0.050











Impressions: 


                                        





Guidance Fluoroscopy  11/09/20 00:00


IMPRESSION:  IMAGE(S) OBTAINED DURING PROCEDURE.


 








Abdomen/Pelvis CT  11/17/20 00:00


IMPRESSION:


1. NASOGASTRIC TUBE, TIP IN THE STOMACH.  CONTRAST IN THE STOMACH AND THROUGHOUT

THE SMALL BOWEL.  DILATED PROXIMAL SMALL BOWEL AND NONDISTENDED DISTAL SMALL 

BOWEL.  PROBABLY PARTIAL SMALL BOWEL OBSTRUCTION.


2. SMALL UMBILICAL HERNIA, CONTAINING A SMALL AMOUNT OF GAS PRESUMED SECONDARY 

TO RECENT SURGERY.


3. COLONIC DIVERTICULOSIS.  NO CT FINDINGS OF ACUTE DIVERTICULITIS.


4. CHRONIC POLYCYSTIC KIDNEY DISEASE.


5. PERITONEAL DIALYSIS CATHETER, DISTAL END IN THE PELVIS.


6. NO OTHER ACUTE OR SIGNIFICANT FINDINGS.


 








KUB X-Ray  11/18/20 00:00


IMPRESSION:  Persistent dilated at least proximal small bowel.  There is air and

stool as well as contrast throughout the colon.  Findings are consistent with 

partial small bowel obstruction.


 








Chest X-Ray  11/19/20 00:00


IMPRESSION:  NO ACUTE RADIOGRAPHIC FINDING IN THE CHEST.


 














Assessment and Plan





- Diagnosis


(1) Small bowel obstruction with strangulation or infarction


Is this a current diagnosis for this admission?: Yes   


Plan: 


Status post hernia repair.


P.o. tolerant, having normal bowel and bladder movements.  


Continue wound care, out of bed to chair as tolerated








(2) ESRD on hemodialysis


Is this a current diagnosis for this admission?: Yes   


Plan: 


Receiving hemodialysis Monday Wednesday Friday.  


He has a Papo catheter on the right IJ.  


Monitor electrolytes and volume status.  








(3) ESRD on peritoneal dialysis


Is this a current diagnosis for this admission?: No   


Plan: 


End-stage renal disease due to polycystic kidneys.  On chronic PD dialysis.


Currently patient receiving hemodialysis through Papo catheter on the right 

IJ.


Outpatient PCP and nephrology follow-up.








(4) Ileus, postoperative


Is this a current diagnosis for this admission?: Yes   


Plan: 


Resolved.  Having normal bowel movements.








(5) Incarcerated umbilical hernia


Is this a current diagnosis for this admission?: Yes   


Plan: 


Status post surgical repair of umbilical hernia repair with mesh.  


Having normal bowel movement.  Nasogastric tube is removed. 








(6) Anemia in chronic kidney disease (CKD)


Qualifiers: 


   Chronic kidney disease stage: on chronic dialysis   Qualified Code(s): N18.6 

- End stage renal disease; D63.1 - Anemia in chronic kidney disease; Z99.2 - 

Dependence on renal dialysis   


Is this a current diagnosis for this admission?: No   


Plan: 


H&H stable.  Receiving Procrit during hemodialysis.








(7) Hypertension


Qualifiers: 


   Hypertension type: essential hypertension   Qualified Code(s): I10 - 

Essential (primary) hypertension   


Is this a current diagnosis for this admission?: No   


Plan: 


Noted to have orthostatic hypotension.  Hold antihypertensives.  Monitor vitals.

 Resume once appropriate.








(8) Polycystic kidney disease


Is this a current diagnosis for this admission?: Yes   


Plan: 


Stable














- Plan Summary


Summary: 


11/17/2020


8:15 PM


Critical care visit


40 minutes


I assessed the patient and then reviewed the use computer records.  I given the 

patient a liter of fluid earlier today.  His blood pressure responded.  He 

admits that he felt better.  This was just prior to the CAT scan.


He still has a large net negative fluid balance.  He has skin tenting and his 

oral mucosa is extremely dry.  He has sunken eye sockets.  His lungs are clear.


After reviewing all of the data including laboratory studies and imaging I 

believe the patient is hypovolemic and this is the reason for the tachycardia.  

In addition, early in his stay he is oliguric.  Of late he has been an uric.


If you compare the heart rate to the blood pressures his blood pressures have 

been relatively soft.


I believe that the patient's blood pressure and pulse are directly related to 

his volume status.  With the nurse at the bedside we discussed the treatment 

plan with the patient.


IV fluid


The patient will receive a 500 mL bolus and then run the remaining fluid at 150 

mL an hour for a total of 2 L of fluid overnight.


Hiccups


The hiccups have responded to the Thorazine.  I was decreasing the Thorazine 

dose.  At this point I will discontinue it as a scheduled medication but leave 

it available as needed every 12 hours for hiccups.  This medication is quite 

sedating and by removing it the patient may have more energy.


Cortisol


Random cortisol drawn early in this hospitalization was 99.  I have ordered a 

morning cortisol for tomorrow in addition to his normal laboratory studies.  I 

will see if there is any evidence of adrenal insufficiency.





- Time


Time Spent with patient: 35 or more minutes


Medications reviewed and adjusted accordingly: Yes


Anticipated Discharge Disposition: Home with Home Health


Anticipated Discharge Timeframe: within 72 hours

## 2020-11-22 NOTE — RADIOLOGY REPORT (SQ)
EXAM DESCRIPTION:  MRI HEAD WITHOUT



IMAGES COMPLETED DATE/TIME:  11/22/2020 12:25 pm



REASON FOR STUDY:  Stroke



COMPARISON:  CT dated 11/21/2020.



TECHNIQUE:  Multiplanar imaging includes non-contrasted T1, T2, FLAIR, and Diffusion with ADC map seq
uences. Images stored on PACS.



LIMITATIONS:  None.



FINDINGS:  ANATOMY: No anomalies. Normal vascular flow voids. Pituitary fossa normal.

CSF SPACES: Normal in size and contour. No hemorrhage.

CEREBRUM: A few high-signal intensity lesions scattered throughout the white matter on FLAIR imaging 
with distribution suggesting chronic micro-vascular ischemic change.  Sulci and gyri normal in size a
nd contour.  No evidence of hemorrhage, mass or extraaxial fluid collection.

POSTERIOR FOSSA: No signal alteration. No hemorrhage. No edema, masses or mass effect.  Internal argelia
tory canals, cerebello-pontine angles, mastoids normal.

DIFFUSION: There are small focal areas of restricted diffusion in the posterior right parietal lobe (
axial images 17 and 18 on diffusion and ADC sequences).

ORBITS: No masses.  Globes normal.

PARANASAL SINUSES: No fluid levels.  Mucosa normal.

OTHER: No other significant finding.



IMPRESSION:  MINIMAL MICROVASCULAR ISCHEMIC CHANGE.  SMALL FOCAL ACUTE LACUNAR INFARCTS IN THE POSTER
IOR RIGHT PARIETAL LOBE.

EVIDENCE OF ACUTE STROKE: YES.  RIGHT MCA.



COMMENT:   The findings were sent to the Radiology Results Communication Center at 12:50 on 11/22/202
0 to be communicated to a licensed caregiver.



TECHNICAL DOCUMENTATION:  JOB ID:  [id number]

 2011 [employer]- All Rights Reserved



Reading location - IP/workstation name: [email or web address]

## 2020-11-22 NOTE — RADIOLOGY REPORT (SQ)
EXAM DESCRIPTION:  MRA HEAD WITHOUT



IMAGES COMPLETED DATE/TIME:  11/22/2020 12:25 pm



REASON FOR STUDY:  Stroke



COMPARISON:  None.



TECHNIQUE:  Axial 3-D time-of-flight acquisition imaging performed through the brain in the area of t
he [place].  Images reformatted using 3-D MIPS.



LIMITATIONS:  Motion artifact on the axial source images.



FINDINGS:  SOURCE IMAGES: No unexpected findings on source images.  No large masses.

3-D MIP: No aneurysm.  No occlusions.  No significant stenosis.

OTHER: No other significant finding.



IMPRESSION:  NORMAL MRA OF THE [place].



TECHNICAL DOCUMENTATION:  JOB ID:  [id number]

 2011 [employer]- All Rights Reserved



Reading location - IP/workstation name: [email or web address]

## 2020-11-22 NOTE — PDOC PROGRESS REPORT
Subjective


Date:: 11/22/20


Subjective:: 


As per admitting physician's note  ZAKIA GOODWIN is a 70 year old male, 

with history of end-stage renal disease on peritoneal dialysis (last session 

last night), Hx Hypercholesterolemia, Hx Hypertension, history of back surgery 

who has a 1 day complaining of mid abdominal pain around the umbilicus.  The 

patient has a known umbilical hernia that he has been following conservatively. 

A CT scan abdomen pelvis has been done upon arrival in the emergency room today 

and this is significant for a strangulated umbilical hernia with infarcted small

bowel and gas in the portal vein system.





11/21/2020.  No acute events overnight.  Patient awake and alert and cooperative

with physical examination.  Stating that he is avoiding eating or drinking as he

has odynophagia due to prolonged NG tube placement, patient was noted to be 

hypotensive and is stating that when he was trying to get up to do his physical 

therapy he was feeling lightheaded, denies any chest pain, nausea, vomiting, 

diarrhea, constipation or any urinary symptoms.





11/22/2020.  Assumed care on 11/21/2020.  On my first encounter patient was 

reporting to me that he was having odynophagia due to prolonged NG tube 

placement and as a result he was not eating much and not taking any fluid, he 

was also mentioning to me that whenever he tries to stand up or when he tries to

get physical therapy he feels lightheaded and dizzy, upon reviewing his vitals I

noted that patient has been having soft is BPs and once a while his systolic 

blood pressure has been in the high 80s and low 90s, I noted the patient has 

history of hypertension, and is on low-dose beta-blockers, I also noted that 

patient had been checked for random cortisol and a.m. cortisol which have been 

WNL.  I figured patient could be orthostatic due to low p.o. intake, held his 

antihypertensives and gave him 500 cc NS and also started him on GI cocktail 

which includes Reglan in it.  Patient was able to tolerate his p.o. intake 

without any problem, but unfortunately towards the evening patient was noted to 

be less responsive with slurred speech and code stroke was called. When code 

stroke was called patient vitals were WNL, a stat CT was negative for any acute 

stroke, and patient was not deemed suitable for TPA due to recent abdominal s

urgery.  Stroke protocol was initiated and patient was started on statins and 

antiplatelets. An ABG showed mild hypoxemia, his fluids were held and he was 

given 1 dose of IV Lasix. Patient was also given 1 dose of Narcan as he had 

received 1 dose of IV morphine, with no significant improvement.  This morning 

patient did not appear to be in acute distress but unfortunately neurologically 

he was unchanged, still drowsy but easily arousable, and having some expressive 

aphasia, bilateral lower extremity clonus and twitching of bilateral upper 

extremities and bilateral nystagmus. Yesterday patient had received Reglan in 

his GI cocktail yesterday, so I gave him IV Benadryl to see if his clonus and 

twitching was related to Reglan side effects, unfortunately no significant 

improvement noted, stat MRI/MRI head ordered which came back positive for small 

focal acute lacunar infarct in the posterior right parietal lobe.  Patient and 

family notified about the results. Patient is stating that he has had similar 

symptoms 2 years ago in [place] and was diagnosed with TIA.  Patient is stating 

that he was taking statin but it was stopped by his PCP, unfortunately patient 

is noted not to have been on antiplatelets however he has been receiving his 

subcutaneous heparin while inpatient.  Patient and family has agreed to be 

transition to short-term rehab if needed.


Reason For Visit: 


HYPOXIC RESPIRATORY FAILURE, NEEDS DIALYSIS,








Physical Exam


Vital Signs: 


                                        











Temp Pulse Resp BP Pulse Ox


 


 98.6 F   112 H  20   94/51 L  98 


 


 11/22/20 10:00  11/22/20 12:00  11/22/20 12:00  11/22/20 12:00  11/22/20 12:00








                                 Intake & Output











 11/21/20 11/22/20 11/23/20





 06:59 06:59 06:59


 


Intake Total 475 760 


 


Output Total 500 0 


 


Balance -25 760 


 


Weight 121.6 kg 123.7 kg 











General appearance: PRESENT: no acute distress, well-developed, well-nourished


Head exam: PRESENT: atraumatic, normocephalic


Respiratory exam: PRESENT: clear to auscultation tasha.  ABSENT: rales, rhonchi, 

wheezes


Cardiovascular exam: PRESENT: RRR.  ABSENT: diastolic murmur, rubs, systolic 

murmur


GI/Abdominal exam: PRESENT: normal bowel sounds, soft.  ABSENT: distended, 

guarding, mass, organolmegaly, rebound, tenderness


Neurological exam: PRESENT: alert, awake, oriented to person, oriented to place,

oriented to time, oriented to situation, CN II-XII grossly intact, motor sensory

deficit - Acute bilateral lower extremity weakness, strength 3/5.  Bilateral 

upper extremity strength 5/5, aphasic, other - Nystagmus, upper extremity 

twitching, bilateral ankle clonus.





Results


Laboratory Results: 


                                        





                                 11/21/20 17:29 





                                 11/22/20 05:15 





                                        











  11/21/20 11/21/20 11/22/20





  17:29 17:29 05:15


 


WBC   5.2 


 


RBC   2.28 L 


 


Hgb   7.4 L 


 


Hct   22.0 L 


 


MCV   96 


 


MCH   32.5 


 


MCHC   33.7 


 


RDW   15.1 H 


 


Plt Count   393 


 


Seg Neutrophils %   75.6 


 


Carbonic Acid  1.02 L  


 


HCO3/H2CO3 Ratio  23:1  


 


ABG pH  7.46 H  


 


ABG pCO2  33.9 L  


 


ABG pO2  59.5 L  


 


ABG HCO3  23.5  


 


ABG O2 Saturation  92.3 L  


 


ABG Base Excess  -0.2  


 


FiO2  2L  


 


Sodium    131.9 L


 


Potassium    4.8


 


Chloride    96 L


 


Carbon Dioxide    26


 


Anion Gap    10


 


BUN    53 H


 


Creatinine    7.15 H


 


Est GFR (African Amer)    9 L


 


Glucose    114 H


 


Calcium    7.5 L


 


Phosphorus    4.3


 


Magnesium    2.1


 


Total Bilirubin    0.5


 


AST    51


 


Alkaline Phosphatase    198 H


 


Total Protein    4.8 L


 


Albumin    2.4 L








                                        











  11/19/20 11/19/20 11/22/20





  20:10 20:10 05:15


 


Creatine Kinase  39 L   29 L


 


Troponin I   0.050 











Impressions: 


                                        





Guidance Fluoroscopy  11/09/20 00:00


IMPRESSION:  IMAGE(S) OBTAINED DURING PROCEDURE.


 








Abdomen/Pelvis CT  11/17/20 00:00


IMPRESSION:


1. NASOGASTRIC TUBE, TIP IN THE STOMACH.  CONTRAST IN THE STOMACH AND THROUGHOUT

THE SMALL BOWEL.  DILATED PROXIMAL SMALL BOWEL AND NONDISTENDED DISTAL SMALL 

BOWEL.  PROBABLY PARTIAL SMALL BOWEL OBSTRUCTION.


2. SMALL UMBILICAL HERNIA, CONTAINING A SMALL AMOUNT OF GAS PRESUMED SECONDARY 

TO RECENT SURGERY.


3. COLONIC DIVERTICULOSIS.  NO CT FINDINGS OF ACUTE DIVERTICULITIS.


4. CHRONIC POLYCYSTIC KIDNEY DISEASE.


5. PERITONEAL DIALYSIS CATHETER, DISTAL END IN THE PELVIS.


6. NO OTHER ACUTE OR SIGNIFICANT FINDINGS.


 








KUB X-Ray  11/18/20 00:00


IMPRESSION:  Persistent dilated at least proximal small bowel.  There is air and

stool as well as contrast throughout the colon.  Findings are consistent with 

partial small bowel obstruction.


 








Chest X-Ray  11/21/20 00:00


IMPRESSION:  NO ACUTE FINDINGS.


 








Head CT  11/21/20 17:32


IMPRESSION:  NO ACUTE INTRACRANIAL FINDINGS.


EVIDENCE OF ACUTE STROKE: NO.


 








Brain MRI with MRA  11/22/20 00:00


IMPRESSION:  NORMAL MRA OF THE [place] OF ROOT.


 








Head MRI  11/22/20 00:00


IMPRESSION:  MINIMAL MICROVASCULAR ISCHEMIC CHANGE.  SMALL FOCAL ACUTE LACUNAR 

INFARCTS IN THE POSTERIOR RIGHT PARIETAL LOBE.


EVIDENCE OF ACUTE STROKE: YES.  RIGHT MCA.


 














Assessment and Plan





- Diagnosis


(1) Orthostatic hypotension


Is this a current diagnosis for this admission?: Yes   


Plan: 


Patient has history of hypertension however noted to be hypotensive on this 

admission. 


Been complaining of lightheadedness.





Patient is end-stage renal disease on hemodialysis.


Denies any history of cirrhosis or any CAD.  No sign of any acute infection.


Random cortisol and free cortisol in a.m. has been within normal limits.





We will check TSH, T3-T4.





Hold antihypertensives.





Unfortunately not a candidate for volume cessation due to end-stage renal dise

ase.





We will start on midodrine, 5 mg p.o. 3 times daily, monitor vitals, fall 

precautions.








(2) Acute ischemic right MCA stroke


Is this a current diagnosis for this admission?: Yes   


Plan: 


MRI head positive for small focal acute lacunar infarct in the posterior right 

parietal lobe.  Right MCA.





Transfer to [facility], neurochecks every 4, statins, antiplatelets, fall, seizure and

aspiration precaution.





PT/OT/ST.  Monitor vitals.








(3) Small bowel obstruction with strangulation or infarction


Is this a current diagnosis for this admission?: Yes   


Plan: 


Status post hernia repair.


P.o. tolerant, having normal bowel and bladder movements.  


Continue wound care, out of bed to chair as tolerated








(4) ESRD on hemodialysis


Is this a current diagnosis for this admission?: Yes   


Plan: 


Receiving hemodialysis Monday Wednesday Friday.  


He has a Papo catheter on the right IJ.  


Monitor electrolytes and volume status.  








(5) ESRD on peritoneal dialysis


Is this a current diagnosis for this admission?: No   


Plan: 


End-stage renal disease due to polycystic kidneys.  On chronic PD dialysis.


Currently patient receiving hemodialysis through Papo catheter on the right 

IJ.


Outpatient PCP and nephrology follow-up.








(6) Ileus, postoperative


Is this a current diagnosis for this admission?: Yes   


Plan: 


Resolved.  Having normal bowel movements.








(7) Incarcerated umbilical hernia


Is this a current diagnosis for this admission?: Yes   


Plan: 


Status post surgical repair of umbilical hernia repair with mesh.  


Having normal bowel movement.  Nasogastric tube is removed. 








(8) Anemia in chronic kidney disease (CKD)


Qualifiers: 


   Chronic kidney disease stage: on chronic dialysis   Qualified Code(s): N18.6 

- End stage renal disease; D63.1 - Anemia in chronic kidney disease; Z99.2 - 

Dependence on renal dialysis   


Is this a current diagnosis for this admission?: No   


Plan: 


H&H stable.  Receiving Procrit during hemodialysis.








(9) Hypertension


Qualifiers: 


   Hypertension type: essential hypertension   Qualified Code(s): I10 - 

Essential (primary) hypertension   


Is this a current diagnosis for this admission?: No   


Plan: 


Noted to have orthostatic hypotension.  Hold antihypertensives.  Monitor vitals.

 Resume once appropriate.








(10) Polycystic kidney disease


Is this a current diagnosis for this admission?: Yes   


Plan: 


Stable








- Plan Summary


Summary: 


11/17/2020


8:15 PM


Critical care visit


40 minutes


I assessed the patient and then reviewed the use computer records.  I given the 

patient a liter of fluid earlier today.  His blood pressure responded.  He 

admits that he felt better.  This was just prior to the CAT scan.


He still has a large net negative fluid balance.  He has skin tenting and his 

oral mucosa is extremely dry.  He has sunken eye sockets.  His lungs are clear.


After reviewing all of the data including laboratory studies and imaging I 

believe the patient is hypovolemic and this is the reason for the tachycardia.  

In addition, early in his stay he is oliguric.  Of late he has been an uric.


If you compare the heart rate to the blood pressures his blood pressures have 

been relatively soft.


I believe that the patient's blood pressure and pulse are directly related to 

his volume status.  With the nurse at the bedside we discussed the treatment 

plan with the patient.


IV fluid


The patient will receive a 500 mL bolus and then run the remaining fluid at 150 

mL an hour for a total of 2 L of fluid overnight.


Hiccups


The hiccups have responded to the Thorazine.  I was decreasing the Thorazine 

dose.  At this point I will discontinue it as a scheduled medication but leave 

it available as needed every 12 hours for hiccups.  This medication is quite 

sedating and by removing it the patient may have more energy.


Cortisol


Random cortisol drawn early in this hospitalization was 99.  I have ordered a 

morning cortisol for tomorrow in addition to his normal laboratory studies.  I 

will see if there is any evidence of adrenal insufficiency.





- Time


Time Spent with patient: 35 or more minutes


Medications reviewed and adjusted accordingly: Yes


Anticipated Discharge Disposition: Skilled Nursing Facility


Anticipated Discharge Timeframe: within 72 hours

## 2020-11-23 NOTE — PDOC PROGRESS REPORT
Subjective


Date:: 11/23/20


Reason For Visit: 


HYPOXIC RESPIRATORY FAILURE, NEEDS DIALYSIS,








Physical Exam


Vital Signs: 


                                        











Temp Pulse Resp BP Pulse Ox


 


 98.1 F   104 H  17   95/57 L  94 


 


 11/23/20 11:10  11/23/20 11:10  11/23/20 11:10  11/23/20 11:10  11/23/20 11:10








                                 Intake & Output











 11/22/20 11/23/20 11/24/20





 06:59 06:59 06:59


 


Intake Total 760 150 


 


Output Total 0  


 


Balance 760 150 


 


Weight 123.7 kg 123.9 kg 














Results


Laboratory Results: 


                                        





                                 11/23/20 05:02 





                                 11/23/20 06:00 





                                        











  11/22/20 11/22/20 11/23/20





  05:15 05:15 05:02


 


WBC    6.4


 


RBC    2.33 L


 


Hgb    7.2 L


 


Hct    22.0 L


 


MCV    95


 


MCH    31.1


 


MCHC    32.9


 


RDW    14.9 H


 


Plt Count    398


 


Seg Neutrophils %    73.7


 


Sodium   


 


Potassium   


 


Chloride   


 


Carbon Dioxide   


 


Anion Gap   


 


BUN   


 


Creatinine   


 


Est GFR (African Amer)   


 


Glucose   


 


Calcium   


 


Phosphorus   


 


Magnesium   


 


Triglycerides  387 H  


 


Cholesterol  165.14  


 


LDL Cholesterol Direct  60  


 


VLDL Cholesterol  77.4 H  


 


HDL Cholesterol  19 L  


 


TSH   2.10 


 


Free T4   1.05 


 


Free T3 pg/mL   2.64 L 














  11/23/20





  06:00


 


WBC 


 


RBC 


 


Hgb 


 


Hct 


 


MCV 


 


MCH 


 


MCHC 


 


RDW 


 


Plt Count 


 


Seg Neutrophils % 


 


Sodium  132.0 L


 


Potassium  5.2 H


 


Chloride  94 L


 


Carbon Dioxide  24


 


Anion Gap  14


 


BUN  67 H


 


Creatinine  8.83 H


 


Est GFR (African Amer)  7 L


 


Glucose  114 H


 


Calcium  7.4 L


 


Phosphorus  5.4 H


 


Magnesium  2.2


 


Triglycerides 


 


Cholesterol 


 


LDL Cholesterol Direct 


 


VLDL Cholesterol 


 


HDL Cholesterol 


 


TSH 


 


Free T4 


 


Free T3 pg/mL 








                                        











  11/19/20 11/19/20 11/22/20





  20:10 20:10 05:15


 


Creatine Kinase  39 L   29 L


 


Troponin I   0.050 











Impressions: 


                                        





Guidance Fluoroscopy  11/09/20 00:00


IMPRESSION:  IMAGE(S) OBTAINED DURING PROCEDURE.


 








Abdomen/Pelvis CT  11/17/20 00:00


IMPRESSION:


1. NASOGASTRIC TUBE, TIP IN THE STOMACH.  CONTRAST IN THE STOMACH AND THROUGHOUT

THE SMALL BOWEL.  DILATED PROXIMAL SMALL BOWEL AND NONDISTENDED DISTAL SMALL 

BOWEL.  PROBABLY PARTIAL SMALL BOWEL OBSTRUCTION.


2. SMALL UMBILICAL HERNIA, CONTAINING A SMALL AMOUNT OF GAS PRESUMED SECONDARY 

TO RECENT SURGERY.


3. COLONIC DIVERTICULOSIS.  NO CT FINDINGS OF ACUTE DIVERTICULITIS.


4. CHRONIC POLYCYSTIC KIDNEY DISEASE.


5. PERITONEAL DIALYSIS CATHETER, DISTAL END IN THE PELVIS.


6. NO OTHER ACUTE OR SIGNIFICANT FINDINGS.


 








KUB X-Ray  11/18/20 00:00


IMPRESSION:  Persistent dilated at least proximal small bowel.  There is air and

stool as well as contrast throughout the colon.  Findings are consistent with 

partial small bowel obstruction.


 








Chest X-Ray  11/21/20 00:00


IMPRESSION:  NO ACUTE FINDINGS.


 








Head CT  11/21/20 17:32


IMPRESSION:  NO ACUTE INTRACRANIAL FINDINGS.


EVIDENCE OF ACUTE STROKE: NO.


 








Brain MRI with MRA  11/22/20 00:00


IMPRESSION:  NORMAL MRA OF THE [place] OF ROOT.


 








Head MRI  11/22/20 00:00


IMPRESSION:  MINIMAL MICROVASCULAR ISCHEMIC CHANGE.  SMALL FOCAL ACUTE LACUNAR 

INFARCTS IN THE POSTERIOR RIGHT PARIETAL LOBE.


EVIDENCE OF ACUTE STROKE: YES.  RIGHT MCA.


 














Assessment & Plan





- Diagnosis


(1) Small bowel obstruction with strangulation or infarction


Is this a current diagnosis for this admission?: Yes   





- Time


Anticipated Discharge Disposition: Unknown


Anticipated Discharge Timeframe: Unknown





- Plan Summary


Plan Summary: 





70-year-old male with an indwelling permacath in the left subclavian vein.  The 

permacath is sluggish today, and the dialysis nurses were unable to complete 

dialysis.  The patient has an acute angulation where the innominate vein meets 

the superior vena cava.  This may be limiting the flow within the catheter.  I 

will place a right femoral temporary dialysis catheter today.  Will discontinue 

the right internal jugular vein central line.  In 48 hours, will plan to place a

right IJ permacath, to avoid the angulation of the innominate vein.  Surgery 

will follow.

## 2020-11-23 NOTE — OPERATIVE REPORT
Nonrecallable Operative Report


PREOPERATIVE DIAGNOSIS: Dysfunctional left subclavian permacath, urgent need for

dialysis today


POSTOPERATIVE DIAGNOSIS: Same as above


OPERATION: 1.  Ultrasound-guided central venous puncture.  2.  Right femoral 

Vas-Cath placement


SURGEON: LOU MILLS


ANESTHESIA: Local


TISSUE REMOVED OR ALTERED: None


COMPLICATIONS: 





None apparent


ESTIMATED BLOOD LOSS: Minimal


PROCEDURE: 





Drains/implants: Right femoral Vas-Cath placement.





Procedure in detail: After informed consent was obtained from the patient, he 

was laid in the supine position in the hospital room.  The ultrasound was used 

to identify the right femoral vein.  It was compressible with normal flow.  The 

right femoral vein was then cannulated using the supplied access needle.  Dark 

venous, nonpulsatile blood was returned in the syringe.  The wire was inserted 

into the vein easily.  The wire was confirmed to be within the lumen of the vein

using the ultrasound device.  Picture documentation was obtained, and placed on 

the chart.  The catheter was then slid over the wire using a modified Seldinger 

technique.  The catheter was then aspirated and flushed x3.  The catheter was 

then sutured to the skin.  A dressing was placed, and the procedure was 

concluded.  All sponge, instrument, and needle counts were correct.





Condition: Fair.

## 2020-11-23 NOTE — PDOC PROGRESS REPORT
Subjective


Date:: 11/23/20


Subjective:: 


As per admitting physician's note  ZAKIA GOODWIN is a 70 year old male, 

with history of end-stage renal disease on peritoneal dialysis (last session 

last night), Hx Hypercholesterolemia, Hx Hypertension, history of back surgery 

who has a 1 day complaining of mid abdominal pain around the umbilicus.  The 

patient has a known umbilical hernia that he has been following conservatively. 

A CT scan abdomen pelvis has been done upon arrival in the emergency room today 

and this is significant for a strangulated umbilical hernia with infarcted small

bowel and gas in the portal vein system.





11/21/2020.  No acute events overnight.  Patient awake and alert and cooperative

with physical examination.  Stating that he is avoiding eating or drinking as he

has odynophagia due to prolonged NG tube placement, patient was noted to be 

hypotensive and is stating that when he was trying to get up to do his physical 

therapy he was feeling lightheaded, denies any chest pain, nausea, vomiting, 

diarrhea, constipation or any urinary symptoms.





11/22/2020.  Assumed care on 11/21/2020.  On my first encounter patient was 

reporting to me that he was having odynophagia due to prolonged NG tube 

placement and as a result he was not eating much and not taking any fluid, he 

was also mentioning to me that whenever he tries to stand up or when he tries to

get physical therapy he feels lightheaded and dizzy, upon reviewing his vitals I

noted that patient has been having soft is BPs and once a while his systolic 

blood pressure has been in the high 80s and low 90s, I noted the patient has 

history of hypertension, and is on low-dose beta-blockers, I also noted that 

patient had been checked for random cortisol and a.m. cortisol which have been 

WNL.  I figured patient could be orthostatic due to low p.o. intake, held his 

antihypertensives and gave him 500 cc NS and also started him on GI cocktail 

which includes Reglan in it.  Patient was able to tolerate his p.o. intake 

without any problem, but unfortunately towards the evening patient was noted to 

be less responsive with slurred speech and code stroke was called. When code 

stroke was called patient vitals were WNL, a stat CT was negative for any acute 

stroke, and patient was not deemed suitable for TPA due to recent abdominal s

urgery.  Stroke protocol was initiated and patient was started on statins and 

antiplatelets. An ABG showed mild hypoxemia, his fluids were held and he was 

given 1 dose of IV Lasix. Patient was also given 1 dose of Narcan as he had 

received 1 dose of IV morphine, with no significant improvement.  This morning 

patient did not appear to be in acute distress but unfortunately neurologically 

he was unchanged, still drowsy but easily arousable, and having some expressive 

aphasia, bilateral lower extremity clonus and twitching of bilateral upper 

extremities and bilateral nystagmus. Yesterday patient had received Reglan in 

his GI cocktail yesterday, so I gave him IV Benadryl to see if his clonus and 

twitching was related to Reglan side effects, unfortunately no significant 

improvement noted, stat MRI/MRI head ordered which came back positive for small 

focal acute lacunar infarct in the posterior right parietal lobe.  Patient and 

family notified about the results. Patient is stating that he has had similar 

symptoms 2 years ago in [place] and was diagnosed with TIA.  Patient is stating 

that he was taking statin but it was stopped by his PCP, unfortunately patient 

is noted not to have been on antiplatelets however he has been receiving his 

subcutaneous heparin while inpatient.  Patient and family has agreed to be 

transition to short-term rehab if needed.





11/23/2020.  Saw patient this morning while waiting to receive hemodialysis in 

the hemodialysis unit, unfortunately patient has not improved neurologically, 

overnight patient was not started on dopamine drip as his pressure was consi

dered to be within normal limits, patient is still somnolent but easily 

arousable, has twitching of upper extremities, mild aphasia and bilateral lower 

extremity clonus more on the right side, upper extremity strength improvement, 

lower extremity strength has not changed, patient is alert and oriented, denies 

any fever, chills, nausea, vomiting.


Reason For Visit: 


HYPOXIC RESPIRATORY FAILURE, NEEDS DIALYSIS,








Physical Exam


Vital Signs: 


                                        











Temp Pulse Resp BP Pulse Ox


 


 98.1 F   104 H  17   95/57 L  94 


 


 11/23/20 11:10  11/23/20 11:10  11/23/20 11:10  11/23/20 11:10  11/23/20 11:10








                                 Intake & Output











 11/22/20 11/23/20 11/24/20





 06:59 06:59 06:59


 


Intake Total 760 150 


 


Output Total 0  


 


Balance 760 150 


 


Weight 123.7 kg 123.9 kg 











General appearance: PRESENT: no acute distress, well-developed, well-nourished, 

other - Somnolent but easily arousable


Head exam: PRESENT: atraumatic, normocephalic


Respiratory exam: PRESENT: clear to auscultation tasha, tachypnea.  ABSENT: rales,

rhonchi, wheezes


Cardiovascular exam: PRESENT: RRR.  ABSENT: diastolic murmur, rubs, systolic 

murmur


GI/Abdominal exam: PRESENT: normal bowel sounds, soft.  ABSENT: distended, 

guarding, mass, organolmegaly, rebound, tenderness


Extremities exam: PRESENT: full ROM.  ABSENT: calf tenderness, clubbing, pedal 

edema


Neurological exam: PRESENT: alert, awake, oriented to person, oriented to place,

CN II-XII grossly intact, motor sensory deficit - Bilateral upper extremity 

strength 5/5.  Significant twitching. Left lower symmetry strength 3/5.  Right 

lower extremities 2/5.  Right ankle clonus.





Results


Laboratory Results: 


                                        





                                 11/23/20 05:02 





                                 11/23/20 06:00 





                                        











  11/22/20 11/22/20 11/23/20





  05:15 05:15 05:02


 


WBC    6.4


 


RBC    2.33 L


 


Hgb    7.2 L


 


Hct    22.0 L


 


MCV    95


 


MCH    31.1


 


MCHC    32.9


 


RDW    14.9 H


 


Plt Count    398


 


Seg Neutrophils %    73.7


 


Sodium   


 


Potassium   


 


Chloride   


 


Carbon Dioxide   


 


Anion Gap   


 


BUN   


 


Creatinine   


 


Est GFR (African Amer)   


 


Glucose   


 


Calcium   


 


Phosphorus   


 


Magnesium   


 


Triglycerides  387 H  


 


Cholesterol  165.14  


 


LDL Cholesterol Direct  60  


 


VLDL Cholesterol  77.4 H  


 


HDL Cholesterol  19 L  


 


TSH   2.10 


 


Free T4   1.05 


 


Free T3 pg/mL   2.64 L 














  11/23/20





  06:00


 


WBC 


 


RBC 


 


Hgb 


 


Hct 


 


MCV 


 


MCH 


 


MCHC 


 


RDW 


 


Plt Count 


 


Seg Neutrophils % 


 


Sodium  132.0 L


 


Potassium  5.2 H


 


Chloride  94 L


 


Carbon Dioxide  24


 


Anion Gap  14


 


BUN  67 H


 


Creatinine  8.83 H


 


Est GFR (African Amer)  7 L


 


Glucose  114 H


 


Calcium  7.4 L


 


Phosphorus  5.4 H


 


Magnesium  2.2


 


Triglycerides 


 


Cholesterol 


 


LDL Cholesterol Direct 


 


VLDL Cholesterol 


 


HDL Cholesterol 


 


TSH 


 


Free T4 


 


Free T3 pg/mL 








                                        











  11/19/20 11/19/20 11/22/20





  20:10 20:10 05:15


 


Creatine Kinase  39 L   29 L


 


Troponin I   0.050 











Impressions: 


                                        





Guidance Fluoroscopy  11/09/20 00:00


IMPRESSION:  IMAGE(S) OBTAINED DURING PROCEDURE.


 








Abdomen/Pelvis CT  11/17/20 00:00


IMPRESSION:


1. NASOGASTRIC TUBE, TIP IN THE STOMACH.  CONTRAST IN THE STOMACH AND THROUGHOUT

THE SMALL BOWEL.  DILATED PROXIMAL SMALL BOWEL AND NONDISTENDED DISTAL SMALL 

BOWEL.  PROBABLY PARTIAL SMALL BOWEL OBSTRUCTION.


2. SMALL UMBILICAL HERNIA, CONTAINING A SMALL AMOUNT OF GAS PRESUMED SECONDARY 

TO RECENT SURGERY.


3. COLONIC DIVERTICULOSIS.  NO CT FINDINGS OF ACUTE DIVERTICULITIS.


4. CHRONIC POLYCYSTIC KIDNEY DISEASE.


5. PERITONEAL DIALYSIS CATHETER, DISTAL END IN THE PELVIS.


6. NO OTHER ACUTE OR SIGNIFICANT FINDINGS.


 








KUB X-Ray  11/18/20 00:00


IMPRESSION:  Persistent dilated at least proximal small bowel.  There is air and

stool as well as contrast throughout the colon.  Findings are consistent with 

partial small bowel obstruction.


 








Chest X-Ray  11/21/20 00:00


IMPRESSION:  NO ACUTE FINDINGS.


 








Head CT  11/21/20 17:32


IMPRESSION:  NO ACUTE INTRACRANIAL FINDINGS.


EVIDENCE OF ACUTE STROKE: NO.


 








Brain MRI with MRA  11/22/20 00:00


IMPRESSION:  NORMAL MRA OF THE [place] OF ROOT.


 








Head MRI  11/22/20 00:00


IMPRESSION:  MINIMAL MICROVASCULAR ISCHEMIC CHANGE.  SMALL FOCAL ACUTE LACUNAR 

INFARCTS IN THE POSTERIOR RIGHT PARIETAL LOBE.


EVIDENCE OF ACUTE STROKE: YES.  RIGHT MCA.


 














Assessment and Plan





- Diagnosis


(1) Orthostatic hypotension


Is this a current diagnosis for this admission?: Yes   


Plan: 


Patient has history of hypertension however noted to be hypotensive on this 

admission. 


Been complaining of lightheadedness.





Patient is end-stage renal disease on hemodialysis.


Denies any history of cirrhosis or any CAD.  No sign of any acute infection. 


Noted to have 1 episode of fever on 11/23/2020 and has been on cefepime since 

then.


Repeat blood cultures pending.


Random cortisol and free cortisol in a.m. has been within normal limits.


TSH, T3, T4 WNL.





Hold antihypertensives.





Unfortunately not a candidate for volume resuscitation due to end-stage renal 

disease.





Continue midodrine 5 mg p.o. 3 times daily, fludrocortisone 1 mg p.o. daily.


Dopamine drip on hold, will restart if MAP drops less than 65 or patient becomes

symptomatic.  








(2) Acute ischemic right MCA stroke


Is this a current diagnosis for this admission?: Yes   


Plan: 


MRI head positive for small focal acute lacunar infarct in the posterior right 

parietal lobe.  Right MCA.





Mild aphasia, bilateral upper extremity twitching, bilateral lower extremity 

weakness and clonus of the right ankle.





Continue telemetry, neurochecks every 4, statins, antiplatelets, fall, seizure 

and aspiration precaution.





PT/OT/ST.  Monitor vitals.








(3) Small bowel obstruction with strangulation or infarction


Is this a current diagnosis for this admission?: Yes   


Plan: 


Status post hernia repair.


P.o. tolerant, having normal bowel and bladder movements.  


Continue wound care, out of bed to chair as tolerated








(4) ESRD on hemodialysis


Is this a current diagnosis for this admission?: Yes   


Plan: 


Receiving hemodialysis Monday Wednesday Friday.  


He has a Papo catheter on the right IJ.  


Monitor electrolytes and volume status.  








(5) ESRD on peritoneal dialysis


Is this a current diagnosis for this admission?: No   


Plan: 


End-stage renal disease due to polycystic kidneys.  On chronic PD dialysis.


Currently patient receiving hemodialysis through Papo catheter on the right 

IJ.


Outpatient PCP and nephrology follow-up.








(6) Ileus, postoperative


Is this a current diagnosis for this admission?: Yes   


Plan: 


Resolved.  Having normal bowel movements.








(7) Incarcerated umbilical hernia


Is this a current diagnosis for this admission?: Yes   


Plan: 


Status post surgical repair of umbilical hernia repair with mesh.  


Having normal bowel movement.  Nasogastric tube is removed. 








(8) Anemia in chronic kidney disease (CKD)


Qualifiers: 


   Chronic kidney disease stage: on chronic dialysis   Qualified Code(s): N18.6 

- End stage renal disease; D63.1 - Anemia in chronic kidney disease; Z99.2 - 

Dependence on renal dialysis   


Is this a current diagnosis for this admission?: No   


Plan: 


Hemoglobin slowly dropping, no evidence of acute bleeding.


Not a candidate for blood transfusion as patient is considering kidney transpl

antation in the future.


Also patient noted to have very brittle volume status, he is end-stage renal 

disease and on 11/23/2020 after receiving 500 mg bolus for his hypotension 

patient became hypoxic.


Monitor H&H, supportive transfusion if hemoglobin drops below 7 or acutely or 

becomes symptomatic.  


He is receiving Procrit administered by nephrology during hemodialysis.








(9) Hypertension


Qualifiers: 


   Hypertension type: essential hypertension   Qualified Code(s): I10 - 

Essential (primary) hypertension   


Is this a current diagnosis for this admission?: No   


Plan: 


Noted to have orthostatic hypotension.  Hold antihypertensives.  Monitor vitals.

 Resume once appropriate.








(10) Polycystic kidney disease


Is this a current diagnosis for this admission?: Yes   


Plan: 


Stable








- Plan Summary


Summary: 


11/17/2020


8:15 PM


Critical care visit


40 minutes


I assessed the patient and then reviewed the use computer records.  I given the 

patient a liter of fluid earlier today.  His blood pressure responded.  He 

admits that he felt better.  This was just prior to the CAT scan.


He still has a large net negative fluid balance.  He has skin tenting and his 

oral mucosa is extremely dry.  He has sunken eye sockets.  His lungs are clear.


After reviewing all of the data including laboratory studies and imaging I 

believe the patient is hypovolemic and this is the reason for the tachycardia.  

In addition, early in his stay he is oliguric.  Of late he has been an uric.


If you compare the heart rate to the blood pressures his blood pressures have 

been relatively soft.


I believe that the patient's blood pressure and pulse are directly related to 

his volume status.  With the nurse at the bedside we discussed the treatment 

plan with the patient.


IV fluid


The patient will receive a 500 mL bolus and then run the remaining fluid at 150 

mL an hour for a total of 2 L of fluid overnight.


Hiccups


The hiccups have responded to the Thorazine.  I was decreasing the Thorazine 

dose.  At this point I will discontinue it as a scheduled medication but leave 

it available as needed every 12 hours for hiccups.  This medication is quite 

sedating and by removing it the patient may have more energy.


Cortisol


Random cortisol drawn early in this hospitalization was 99.  I have ordered a 

morning cortisol for tomorrow in addition to his normal laboratory studies.  I 

will see if there is any evidence of adrenal insufficiency.





- Time


Time Spent with patient: 35 or more minutes


Anticipated Discharge Disposition: Skilled Nursing Facility


Anticipated Discharge Timeframe: within 72 hours

## 2020-11-23 NOTE — PDOC PROGRESS REPORT
Subjective


Date:: 11/23/20


Subjective:: 





I am seeing the patient during initiation of dialysis this morning.  Patient's P

ermCath was not working so we placed Activase for 30 minutes but after that it 

continues to be nonfunctional.  I then called Dr. Morris, surgeon on-call to 

place a temporary dialysis catheter and possibly replace his PermCath at a later

time.  This morning the patient seems to be lethargic and has obviously been 

having this involuntary jerking movements of upper extremities.  He is arousable

but does not verbalize much.  Events over the weekend noted.  Apparently the 

patient had hypotensive episodes with feeling of dizziness and drowsiness.  

Subsequently the patient developed some stroke symptoms including aphasia and 

starting involuntary jerking movements.  Head CT was negative but brain MRI 

showed an acute lacunar infarct at the posterior lobe of the right MCA 

territory.  He was given IV fluids, 500 mL bolus for the hypotension but became 

hypoxic.  He was also given Reglan which was discontinued.  Patient was started 

on midodrine and Florinef.





4:15 PM.  Dr. Morris has successfully place a temporary dialysis catheter.  We 

took the patient back and started him on his dialysis.  Unfortunately the 

patient is still hypotensive with very minimal ultrafiltration so we had to turn

off ultrafiltration.  Patient woke up a little bit more this afternoon for me 

but still looks tired and lethargic but continues episodic upper extremity 

tremors.  He is being monitored throughout dialysis treatment.


Reason For Visit: 


HYPOXIC RESPIRATORY FAILURE, NEEDS DIALYSIS,








Physical Exam


Vital Signs: 


                                        











Temp Pulse Resp BP Pulse Ox


 


 98.3 F   108 H  20   97/54 L  92 


 


 11/23/20 03:40  11/23/20 08:50  11/23/20 08:50  11/23/20 04:00  11/23/20 08:50








                                 Intake & Output











 11/22/20 11/23/20 11/24/20





 06:59 06:59 06:59


 


Intake Total 760 150 


 


Output Total 0  


 


Balance 760 150 


 


Weight 123.7 kg 123.9 kg 








Vitals during dialysis this afternoon: Blood pressure 114/58, heart rate of 108,

blood flow rate of 350 mL/min and dialysate flow rate of 800 mL/min.


Exam: 





General appearance: PRESENT: no acute distress, cooperative, well-developed, 

well-nourished


Head exam: PRESENT: atraumatic, normocephalic


Eye exam: PRESENT: conjunctiva pale, PERRLA.  ABSENT: scleral icterus


Neck exam: ABSENT: JVD


Respiratory exam: PRESENT: Diminished breath sounds.  ABSENT: crackles, rales, 

rhonchi, unlabored, wheezes


Cardiovascular exam: PRESENT: Regular rate rhythm -+S1, +S2.  ABSENT: diastolic 

murmur, systolic murmur


GI/Abdominal exam: PRESENT: normal bowel sounds, soft.  ABSENT: guarding, mass, 

tenderness


Extremities exam: ABSENT: No edema


Neurological exam: PRESENT: Lethargic but arousable and mumbles few words, upper

extremities cysts this involuntary movements.


Skin exam: PRESENT: dry, warm,


Cardiovascular exam: PRESENT: +S1, +S2


GI/Abdominal exam: PRESENT: distended, normal bowel sounds, tenderness.  ABSENT:

guarding, organomegaly





Results


Laboratory Results: 


                                        





                                 11/23/20 05:02 





                                 11/23/20 06:00 





                                        











  11/22/20 11/22/20 11/23/20





  05:15 05:15 05:02


 


WBC    6.4


 


RBC    2.33 L


 


Hgb    7.2 L


 


Hct    22.0 L


 


MCV    95


 


MCH    31.1


 


MCHC    32.9


 


RDW    14.9 H


 


Plt Count    398


 


Seg Neutrophils %    73.7


 


Sodium   


 


Potassium   


 


Chloride   


 


Carbon Dioxide   


 


Anion Gap   


 


BUN   


 


Creatinine   


 


Est GFR (African Amer)   


 


Glucose   


 


Calcium   


 


Phosphorus   


 


Magnesium   


 


Triglycerides  387 H  


 


Cholesterol  165.14  


 


LDL Cholesterol Direct  60  


 


VLDL Cholesterol  77.4 H  


 


HDL Cholesterol  19 L  


 


TSH   2.10 


 


Free T4   1.05 


 


Free T3 pg/mL   2.64 L 














  11/23/20





  06:00


 


WBC 


 


RBC 


 


Hgb 


 


Hct 


 


MCV 


 


MCH 


 


MCHC 


 


RDW 


 


Plt Count 


 


Seg Neutrophils % 


 


Sodium  132.0 L


 


Potassium  5.2 H


 


Chloride  94 L


 


Carbon Dioxide  24


 


Anion Gap  14


 


BUN  67 H


 


Creatinine  8.83 H


 


Est GFR ([place] Amer)  7 L


 


Glucose  114 H


 


Calcium  7.4 L


 


Phosphorus  5.4 H


 


Magnesium  2.2


 


Triglycerides 


 


Cholesterol 


 


LDL Cholesterol Direct 


 


VLDL Cholesterol 


 


HDL Cholesterol 


 


TSH 


 


Free T4 


 


Free T3 pg/mL 








                                        











  11/19/20 11/19/20 11/22/20





  20:10 20:10 05:15


 


Creatine Kinase  39 L   29 L


 


Troponin I   0.050 











Impressions: 


                                        





Guidance Fluoroscopy  11/09/20 00:00


IMPRESSION:  IMAGE(S) OBTAINED DURING PROCEDURE.


 








Abdomen/Pelvis CT  11/17/20 00:00


IMPRESSION:


1. NASOGASTRIC TUBE, TIP IN THE STOMACH.  CONTRAST IN THE STOMACH AND THROUGHOUT

THE SMALL BOWEL.  DILATED PROXIMAL SMALL BOWEL AND NONDISTENDED DISTAL SMALL 

BOWEL.  PROBABLY PARTIAL SMALL BOWEL OBSTRUCTION.


2. SMALL UMBILICAL HERNIA, CONTAINING A SMALL AMOUNT OF GAS PRESUMED SECONDARY 

TO RECENT SURGERY.


3. COLONIC DIVERTICULOSIS.  NO CT FINDINGS OF ACUTE DIVERTICULITIS.


4. CHRONIC POLYCYSTIC KIDNEY DISEASE.


5. PERITONEAL DIALYSIS CATHETER, DISTAL END IN THE PELVIS.


6. NO OTHER ACUTE OR SIGNIFICANT FINDINGS.


 








KUB X-Ray  11/18/20 00:00


IMPRESSION:  Persistent dilated at least proximal small bowel.  There is air and

stool as well as contrast throughout the colon.  Findings are consistent with 

partial small bowel obstruction.


 








Chest X-Ray  11/21/20 00:00


IMPRESSION:  NO ACUTE FINDINGS.


 








Head CT  11/21/20 17:32


IMPRESSION:  NO ACUTE INTRACRANIAL FINDINGS.


EVIDENCE OF ACUTE STROKE: NO.


 








Brain MRI with MRA  11/22/20 00:00


IMPRESSION:  NORMAL MRA OF THE [place] OF [place].


 








Head MRI  11/22/20 00:00


IMPRESSION:  MINIMAL MICROVASCULAR ISCHEMIC CHANGE.  SMALL FOCAL ACUTE LACUNAR 

INFARCTS IN THE POSTERIOR RIGHT PARIETAL LOBE.


EVIDENCE OF ACUTE STROKE: YES.  RIGHT MCA.


 














Assessment & Plan





- Diagnosis


(1) End stage renal disease


Is this a current diagnosis for this admission?: Yes   


Plan: 


We will do dialysis today for 3 hours, using the patient's newly placed 

temporary dialysis catheter, with 2 potassium bath, blood flow rate of 350 mL 

per minute, dialysate flow rate of 800 mL per minute, ultrafiltration 0 to 0.5 L

as tolerated but unlikely to get any ultrafiltration due to hypotension, no 

heparin and Retacrit of 25,000 units during dialysis intravenously.  Patient is 

being monitored very closely especially with hypotension.


Although the patient is lethargic he was able to tell me in that he is thinking 

of staying on hemodialysis rather than going back to peritoneal dialysis.  He 

will keep his PD catheter for now until he is really decided when he is 

clinically better.





Dr. Morris plans to do the PermCath on the right IJ on Wednesday.








(2) Acute ischemic right MCA stroke


Is this a current diagnosis for this admission?: Yes   


Plan: 


Acute small lacunar infarct on the posterior lobe.  Currently more lethargic and

observed myoclonic jerks.








(3) Hypotension


Is this a current diagnosis for this admission?: Yes   


Plan: 


Does not appear to have any evidence of sepsis at this point.  No evidence of 

adrenal insufficiency.  Discussed with Dr. Daniels to increase midodrine to 10 

mg twice daily to 3 times daily.  May continue Florinef for now.








(4) Hyperkalemia


Is this a current diagnosis for this admission?: Yes   


Plan: 


Dialysis today.








(5) Anemia in chronic kidney disease (CKD)


Qualifiers: 


   Chronic kidney disease stage: on chronic dialysis   Qualified Code(s): N18.6 

- End stage renal disease; D63.1 - Anemia in chronic kidney disease; Z99.2 - 

Dependence on renal dialysis   


Is this a current diagnosis for this admission?: No   


Plan: 


Retacrit during dialysis as needed.  Patient refused blood transfusion as this 

may cause sensitization and may cause less chance of having kidney transplant as

he is on the list.  However if the patient's hemoglobin drops down below 7 we 

will revisit this and discuss this again with him.








(6) Hyponatremia


Is this a current diagnosis for this admission?: Yes   


Plan: 


Mild.








(7) Incarcerated umbilical hernia


Is this a current diagnosis for this admission?: Yes   


Plan: 


Status post umbilical herniorrhaphy with mesh, 11/8/2020.








- Time


Time with patient: 15-25 minutes

## 2020-11-24 NOTE — PDOC PROGRESS REPORT
Subjective


Date:: 11/24/20


Reason For Visit: 


HYPOXIC RESPIRATORY FAILURE, NEEDS DIALYSIS,


Patient confused this morning.  Temporary right groin Vas-Cath functioning 

satisfactorily at dialysis yesterday.  Patient hemodynamically stable.  Patient 

with low blood pressure now on low-dose dopamine drip.





Physical Exam


Vital Signs: 


                                        











Temp Pulse Resp BP Pulse Ox


 


 98.0 F   101 H  18   114/63   95 


 


 11/24/20 03:36  11/24/20 09:00  11/24/20 08:18  11/24/20 09:00  11/24/20 08:18








                                 Intake & Output











 11/23/20 11/24/20 11/25/20





 06:59 06:59 06:59


 


Intake Total 150 1164 42


 


Output Total  1000 


 


Balance 150 164 42


 


Weight 123.9 kg 124.3 kg 











General appearance: PRESENT: other - Confused sleepy.


Neck exam: PRESENT: other


GI/Abdominal exam: PRESENT: other - Abdomen soft, nontender; operative site 

closed; Tenckhoff catheter site clean





Results


Laboratory Results: 


                                        





                                 11/24/20 03:31 





                                 11/24/20 03:31 





                                        











  11/24/20 11/24/20 11/24/20





  02:46 03:31 03:31


 


WBC   8.4 


 


RBC   2.40 L 


 


Hgb   7.6 L 


 


Hct   22.6 L 


 


MCV   94 


 


MCH   31.5 


 


MCHC   33.4 


 


RDW   15.5 H 


 


Plt Count   449 


 


VBG pH  Cancelled  


 


VBG pCO2  Cancelled  


 


VBG HCO3  Cancelled  


 


VBG Base Excess  Cancelled  


 


Sodium    136.4 L


 


Potassium    4.2  D


 


Chloride    96 L


 


Carbon Dioxide    28


 


Anion Gap    12


 


BUN    43 H D


 


Creatinine    6.01 H


 


Est GFR ( Amer)    11 L


 


Glucose    146 H


 


Calcium    7.9 L


 


Phosphorus    4.1


 


Magnesium    2.2


 


Total Bilirubin    0.5


 


AST    45


 


Alkaline Phosphatase    237 H


 


Total Protein    6.0 L


 


Albumin    2.9 L














  11/24/20





  03:31


 


WBC 


 


RBC 


 


Hgb 


 


Hct 


 


MCV 


 


MCH 


 


MCHC 


 


RDW 


 


Plt Count 


 


VBG pH  7.38


 


VBG pCO2  48.2


 


VBG HCO3  28.1


 


VBG Base Excess  2.6


 


Sodium 


 


Potassium 


 


Chloride 


 


Carbon Dioxide 


 


Anion Gap 


 


BUN 


 


Creatinine 


 


Est GFR (African Amer) 


 


Glucose 


 


Calcium 


 


Phosphorus 


 


Magnesium 


 


Total Bilirubin 


 


AST 


 


Alkaline Phosphatase 


 


Total Protein 


 


Albumin 








                                        











  11/19/20 11/19/20 11/22/20





  20:10 20:10 05:15


 


Creatine Kinase  39 L   29 L


 


Troponin I   0.050 











Impressions: 


                                        





[facility] Fluoroscopy  11/09/20 00:00


IMPRESSION:  IMAGE(S) OBTAINED DURING PROCEDURE.


 








Abdomen/Pelvis CT  11/17/20 00:00


IMPRESSION:


1. NASOGASTRIC TUBE, TIP IN THE STOMACH.  CONTRAST IN THE STOMACH AND THROUGHOUT

THE SMALL BOWEL.  DILATED PROXIMAL SMALL BOWEL AND NONDISTENDED DISTAL SMALL 

BOWEL.  PROBABLY PARTIAL SMALL BOWEL OBSTRUCTION.


2. SMALL UMBILICAL HERNIA, CONTAINING A SMALL AMOUNT OF GAS PRESUMED SECONDARY 

TO RECENT SURGERY.


3. COLONIC DIVERTICULOSIS.  NO CT FINDINGS OF ACUTE DIVERTICULITIS.


4. CHRONIC POLYCYSTIC KIDNEY DISEASE.


5. PERITONEAL DIALYSIS CATHETER, DISTAL END IN THE PELVIS.


6. NO OTHER ACUTE OR SIGNIFICANT FINDINGS.


 








KUB X-Ray  11/18/20 00:00


IMPRESSION:  Persistent dilated at least proximal small bowel.  There is air and

stool as well as contrast throughout the colon.  Findings are consistent with 

partial small bowel obstruction.


 








Chest X-Ray  11/21/20 00:00


IMPRESSION:  NO ACUTE FINDINGS.


 








Head CT  11/21/20 17:32


IMPRESSION:  NO ACUTE INTRACRANIAL FINDINGS.


EVIDENCE OF ACUTE STROKE: NO.


 








Brain MRI with MRA  11/22/20 00:00


IMPRESSION:  NORMAL MRA OF THE [place] OF ROOT.


 








Head MRI  11/22/20 00:00


IMPRESSION:  MINIMAL MICROVASCULAR ISCHEMIC CHANGE.  SMALL FOCAL ACUTE LACUNAR 

INFARCTS IN THE POSTERIOR RIGHT PARIETAL LOBE.


EVIDENCE OF ACUTE STROKE: YES.  RIGHT MCA.


 














Assessment & Plan





- Diagnosis


(1) Hemodialysis catheter malfunction


Is this a current diagnosis for this admission?: Yes   


Plan: 


Impression: Patient had right central line removed, and left subclavian perm 

catheter removed yesterday.  Patient currently dialyzing through right groin 

temporary access catheter.  Patient with hemodynamic instability now on dopamine

and receiving echocardiogram.





Recommendations:





1.  Assuming patient stabilizes hemodynamically, will plan for permacatheter 

replacement right neck in the next 24 hours.





2.  I have asked the nurse to hold heparin and aspirin.





3.  The above plan discussed with hospitalist service.








(2) Incarcerated umbilical hernia


Is this a current diagnosis for this admission?: Yes   





- Time


Anticipated Discharge Disposition: Home, Self Care


Anticipated Discharge Timeframe: within 24 hours


Time Spent: 30 to 50 Minutes


Smoking Cessation Education: 3 to 10 minutes


Medications reviewed and adjusted accordingly: Yes

## 2020-11-24 NOTE — XCELERA REPORT
[address] [address]

                               Tel: [phone]

                               Fax: [phone]



                      Transthoracic Echocardiogram Report

_______________________________________________________________________________



Name: ZAKIA GOODWIN

MRN: [id number]                           Age: 70 yrs

Gender: Male                              : 1950

Patient Status: Inpatient                 Patient Location: [address] [place] [address]

Account #: [id number]

Study Date: 2020 10:05 AM

Accession #: [id number]

_______________________________________________________________________________



Height: 73 in        Weight: 274 lb        BSA: 2.5 m2

_______________________________________________________________________________

Procedure: A two-dimensional transthoracic echocardiogram with color flow and

Doppler was performed. Study Quality: Poor. Images were not obtained from all

of the standard acoustic windows due to the limited scope of the study.

Reason For Study: hypotension



History: hypotension.

Ordering Physician: DOMINIQUE JUAREZ



Performed By: Love Hogue

_______________________________________________________________________________



Interpretation Summary

PROBABLY NORMAL LV SIZE, AND NO LVH.'NO 'TRUE APICAL 2 CHAMBER' VIEWS

OBTAINED.hENCE CANNOT ASSESS THE iAPICAL INFERIOR ,,BASAL INFERIOR

WALL,,APICAL ANTERIOR AND BASAL ANTERIOR WALLS.. PROBABLY THE MID INFERIOR

WALLS ,AND THE REST OF THE LV WALLS PROBABLY CONTRACTNORMALLY.IN THESE VIEWS

LVEF IS NORMAL AND GRETAER THAN 70%.

The RV,RA and LA not well visualised to comment.

Probably no AS or AR.Probably trace TR and normal RVSP.Cannot comment on the

mitral and pulmonic valves.No defenite pericardial effusion.



MMode/2D Measurements & Calculations

RVDd: 2.5 cm        LVIDd: 4.8 cm FS: 51.7 %           Ao root diam: 2.3 cm



IVSd: 1.1 cm        LVIDs: 2.3 cm EDV(Teich): 108.0 ml Ao root area: 4.3 cm2

                    LVPWd: 1.1 cm ESV(Teich): 18.5 ml  LA dimension: 3.6 cm

                                  EF(Teich): 82.8 %

        _______________________________________________________________

LVOT diam: 2.0 cm



LVOT area: 3.0 cm2



Doppler Measurements & Calculations

MV E max abril:      MV dec slope:        Ao V2 max:         LV V1 max P.3 cm/sec        353.4 cm/sec2        199.0 cm/sec       4.8 mmHg

MV A max abril:      MV dec time:         Ao max PG:         LV V1 mean P.5 cm/sec        0.17 sec             15.9 mmHg          2.1 mmHg

MV E/A: 0.77                            Ao V2 mean:        LV V1 max:

                                        126.5 cm/sec       109.5 cm/sec

                                        Ao mean PG:        LV V1 mean:

                                        7.6 mmHg           65.5 cm/sec

                                        Ao V2 VTI: 23.1 cm LV V1 VTI: 14.0 cm

                                        BANDAR(I,D): 1.8 cm2



                                        BANDAR(V,D): 1.7 cm2

        _______________________________________________________________

MR max abril:        SV(LVOT): 42.5 ml    PA V2 max:         TR max abril:

204.7 cm/sec                            116.0 cm/sec       123.2 cm/sec

MR max PG:                              PA max PG:         TR max P.8 mmHg                               5.4 mmHg           6.1 mmHg





Left Ventricle

PROBABLY NORMAL LV SIZE, AND NO LVH.'NO 'TRUE APICAL 2 CHAMBER' VIEWS

OBTAINED.hENCE CANNOT ASSESS THE iAPICAL INFERIOR ,,BASAL INFERIOR

WALL,,APICAL ANTERIOR AND BASAL ANTERIOR WALLS.. PROBABLY THE MID INFERIOR

WALLS ,AND THE REST OF THE LV WALLS PROBABLY CONTRACTNORMALLY.IN THESE VIEWS

LVEF IS NORMAL AND GRETAER THAN 70%.



Right Ventricle

The right ventricle is not well visualized secondary to technical limitations.



[place]

The RV,RA and LA not well visualised to comment.

Probably no AS or AR.Probably trace TR and normal RVSP.Cannot comment on the

mitral and pulmonic valves.No defenite pericardial effusion.





_______________________________________________________________________________

_______________________________________________________________________________

Electronically signed by:      Deisy Cordova      on 2020 11:22 PM



CC: DOMINIQUE JUAREZ Lakshmi

## 2020-11-24 NOTE — EKG REPORT
SEVERITY:- ABNORMAL ECG -

SINUS TACHYCARDIA

RBBB AND LAFB

:

Confirmed by: Chandler Espinal MD 24-Nov-2020 12:54:06

## 2020-11-24 NOTE — PDOC PROGRESS REPORT
Subjective


Date:: 11/24/20


Subjective:: 


As per admitting physician's note  ZAKIA GOODWIN is a 70 year old male, 

with history of end-stage renal disease on peritoneal dialysis (last session 

last night), Hx Hypercholesterolemia, Hx Hypertension, history of back surgery 

who has a 1 day complaining of mid abdominal pain around the umbilicus.  The 

patient has a known umbilical hernia that he has been following conservatively. 

A CT scan abdomen pelvis has been done upon arrival in the emergency room today 

and this is significant for a strangulated umbilical hernia with infarcted small

bowel and gas in the portal vein system.





11/21/2020.  No acute events overnight.  Patient awake and alert and cooperative

with physical examination.  Stating that he is avoiding eating or drinking as he

has odynophagia due to prolonged NG tube placement, patient was noted to be 

hypotensive and is stating that when he was trying to get up to do his physical 

therapy he was feeling lightheaded, denies any chest pain, nausea, vomiting, 

diarrhea, constipation or any urinary symptoms.





11/22/2020.  Assumed care on 11/21/2020.  On my first encounter patient was 

reporting to me that he was having odynophagia due to prolonged NG tube 

placement and as a result he was not eating much and not taking any fluid, he 

was also mentioning to me that whenever he tries to stand up or when he tries to

get physical therapy he feels lightheaded and dizzy, upon reviewing his vitals I

noted that patient has been having soft is BPs and once a while his systolic 

blood pressure has been in the high 80s and low 90s, I noted the patient has 

history of hypertension, and is on low-dose beta-blockers, I also noted that 

patient had been checked for random cortisol and a.m. cortisol which have been 

WNL.  I figured patient could be orthostatic due to low p.o. intake, held his 

antihypertensives and gave him 500 cc NS and also started him on GI cocktail 

which includes Reglan in it.  Patient was able to tolerate his p.o. intake 

without any problem, but unfortunately towards the evening patient was noted to 

be less responsive with slurred speech and code stroke was called. When code 

stroke was called patient vitals were WNL, a stat CT was negative for any acute 

stroke, and patient was not deemed suitable for TPA due to recent abdominal s

urgery.  Stroke protocol was initiated and patient was started on statins and 

antiplatelets. An ABG showed mild hypoxemia, his fluids were held and he was 

given 1 dose of IV Lasix. Patient was also given 1 dose of Narcan as he had 

received 1 dose of IV morphine, with no significant improvement.  This morning 

patient did not appear to be in acute distress but unfortunately neurologically 

he was unchanged, still drowsy but easily arousable, and having some expressive 

aphasia, bilateral lower extremity clonus and twitching of bilateral upper 

extremities and bilateral nystagmus. Yesterday patient had received Reglan in 

his GI cocktail yesterday, so I gave him IV Benadryl to see if his clonus and 

twitching was related to Reglan side effects, unfortunately no significant 

improvement noted, stat MRI/MRI head ordered which came back positive for small 

focal acute lacunar infarct in the posterior right parietal lobe.  Patient and 

family notified about the results. Patient is stating that he has had similar 

symptoms 2 years ago in [place] and was diagnosed with TIA.  Patient is stating 

that he was taking statin but it was stopped by his PCP, unfortunately patient 

is noted not to have been on antiplatelets however he has been receiving his 

subcutaneous heparin while inpatient.  Patient and family has agreed to be 

transition to short-term rehab if needed.





11/23/2020.  Saw patient this morning while waiting to receive hemodialysis in 

the hemodialysis unit, unfortunately patient has not improved neurologically, 

overnight patient was not started on dopamine drip as his pressure was consi

dered to be within normal limits, patient is still somnolent but easily 

arousable, has twitching of upper extremities, mild aphasia and bilateral lower 

extremity clonus more on the right side, upper extremity strength improvement, 

lower extremity strength has not changed, patient is alert and oriented, denies 

any fever, chills, nausea, vomiting.





11/24/2020.  No acute events overnight.  Patient overnight was noted to be 

hypotensive, started on dopamine drip, patient was maintained in 140s overnight,

this morning dopamine drip has been weaned down and currently pressure is in 

110s, unfortunately patient still seems a little confused, complaining of 

dizziness, mild dysarthria, bilateral upper extremity twitching, and lower 

extremity weakness, patient is p.o. tolerant, having normal bowel and bladder 

movements.  Denies any fever, chills, nausea, vomiting.


Reason For Visit: 


HYPOXIC RESPIRATORY FAILURE, NEEDS DIALYSIS,








Physical Exam


Vital Signs: 


                                        











Temp Pulse Resp BP Pulse Ox


 


 98.0 F   101 H  18   114/63   95 


 


 11/24/20 03:36  11/24/20 09:00  11/24/20 08:18  11/24/20 09:00  11/24/20 08:18








                                 Intake & Output











 11/23/20 11/24/20 11/25/20





 06:59 06:59 06:59


 


Intake Total 150 1164 42


 


Output Total  1000 


 


Balance 150 164 42


 


Weight 123.9 kg 124.3 kg 











General appearance: PRESENT: no acute distress, obese, well-developed, well-

nourished


Head exam: PRESENT: atraumatic, normocephalic


Respiratory exam: PRESENT: clear to auscultation tasha.  ABSENT: rales, rhonchi, 

wheezes


Cardiovascular exam: PRESENT: RRR.  ABSENT: diastolic murmur, rubs, systolic 

murmur


GI/Abdominal exam: PRESENT: normal bowel sounds, soft.  ABSENT: distended, 

guarding, mass, organolmegaly, rebound, tenderness


Extremities exam: PRESENT: full ROM.  ABSENT: calf tenderness, clubbing, pedal 

edema


Neurological exam: PRESENT: alert, awake, oriented to person, oriented to place,

oriented to time, CN II-XII grossly intact - Right lateral gaze nystagmus. Mild 

dysarthria..  ABSENT: motor sensory deficit


Skin exam: PRESENT: dry, intact, warm.  ABSENT: cyanosis, rash





Results


Laboratory Results: 


                                        





                                 11/24/20 03:31 





                                 11/24/20 03:31 





                                        











  11/24/20 11/24/20 11/24/20





  02:46 03:31 03:31


 


WBC   8.4 


 


RBC   2.40 L 


 


Hgb   7.6 L 


 


Hct   22.6 L 


 


MCV   94 


 


MCH   31.5 


 


MCHC   33.4 


 


RDW   15.5 H 


 


Plt Count   449 


 


VBG pH  Cancelled  


 


VBG pCO2  Cancelled  


 


VBG HCO3  Cancelled  


 


VBG Base Excess  Cancelled  


 


Sodium    136.4 L


 


Potassium    4.2  D


 


Chloride    96 L


 


Carbon Dioxide    28


 


Anion Gap    12


 


BUN    43 H D


 


Creatinine    6.01 H


 


Est GFR ( Amer)    11 L


 


Glucose    146 H


 


Calcium    7.9 L


 


Phosphorus    4.1


 


Magnesium    2.2


 


Total Bilirubin    0.5


 


AST    45


 


Alkaline Phosphatase    237 H


 


Total Protein    6.0 L


 


Albumin    2.9 L














  11/24/20





  03:31


 


WBC 


 


RBC 


 


Hgb 


 


Hct 


 


MCV 


 


MCH 


 


MCHC 


 


RDW 


 


Plt Count 


 


VBG pH  7.38


 


VBG pCO2  48.2


 


VBG HCO3  28.1


 


VBG Base Excess  2.6


 


Sodium 


 


Potassium 


 


Chloride 


 


Carbon Dioxide 


 


Anion Gap 


 


BUN 


 


Creatinine 


 


Est GFR (African Amer) 


 


Glucose 


 


Calcium 


 


Phosphorus 


 


Magnesium 


 


Total Bilirubin 


 


AST 


 


Alkaline Phosphatase 


 


Total Protein 


 


Albumin 








                                        











  11/19/20 11/19/20 11/22/20





  20:10 20:10 05:15


 


Creatine Kinase  39 L   29 L


 


Troponin I   0.050 











Impressions: 


                                        





Guidance Fluoroscopy  11/09/20 00:00


IMPRESSION:  IMAGE(S) OBTAINED DURING PROCEDURE.


 








Abdomen/Pelvis CT  11/17/20 00:00


IMPRESSION:


1. NASOGASTRIC TUBE, TIP IN THE STOMACH.  CONTRAST IN THE STOMACH AND THROUGHOUT

THE SMALL BOWEL.  DILATED PROXIMAL SMALL BOWEL AND NONDISTENDED DISTAL SMALL 

BOWEL.  PROBABLY PARTIAL SMALL BOWEL OBSTRUCTION.


2. SMALL UMBILICAL HERNIA, CONTAINING A SMALL AMOUNT OF GAS PRESUMED SECONDARY 

TO RECENT SURGERY.


3. COLONIC DIVERTICULOSIS.  NO CT FINDINGS OF ACUTE DIVERTICULITIS.


4. CHRONIC POLYCYSTIC KIDNEY DISEASE.


5. PERITONEAL DIALYSIS CATHETER, DISTAL END IN THE PELVIS.


6. NO OTHER ACUTE OR SIGNIFICANT FINDINGS.


 








KUB X-Ray  11/18/20 00:00


IMPRESSION:  Persistent dilated at least proximal small bowel.  There is air and

stool as well as contrast throughout the colon.  Findings are consistent with 

partial small bowel obstruction.


 








Chest X-Ray  11/21/20 00:00


IMPRESSION:  NO ACUTE FINDINGS.


 








Head CT  11/21/20 17:32


IMPRESSION:  NO ACUTE INTRACRANIAL FINDINGS.


EVIDENCE OF ACUTE STROKE: NO.


 








Brain MRI with MRA  11/22/20 00:00


IMPRESSION:  NORMAL MRA OF THE [place] OF ROOT.


 








Head MRI  11/22/20 00:00


IMPRESSION:  MINIMAL MICROVASCULAR ISCHEMIC CHANGE.  SMALL FOCAL ACUTE LACUNAR 

INFARCTS IN THE POSTERIOR RIGHT PARIETAL LOBE.


EVIDENCE OF ACUTE STROKE: YES.  RIGHT MCA.


 














Assessment and Plan





- Diagnosis


(1) Orthostatic hypotension


Is this a current diagnosis for this admission?: Yes   


Plan: 


BP currently maintained on dopamine drip and midodrine.  


Patient has history of hypertension however noted to be hypotensive on this 

admission. 


Been complaining of lightheadedness.





Patient is end-stage renal disease on hemodialysis.


Denies any history of cirrhosis or any CAD.  No sign of any acute infection. 


Noted to have 1 episode of fever on 11/23/2020 and has been on cefepime since 

then.


Repeat blood cultures no growth so far.  


P.o. tolerant and having normal bowel movements.





Random cortisol and free cortisol in a.m. has been within normal limits.


TSH, T3, T4 WNL.





Hold antihypertensives.


Unfortunately not a candidate for volume resuscitation due to end-stage renal 

disease.


2D echo ordered.  Pending results.





Continue midodrine 10 mg p.o. 2 times daily, fludrocortisone 1 mg p.o. daily.


Wean down dopamine and restart if MAP drops less than 65 or patient becomes 

symptomatic.  








(2) Acute ischemic right MCA stroke


Is this a current diagnosis for this admission?: Yes   


Plan: 


MRI head positive for small focal acute lacunar infarct in the posterior right 

parietal lobe.  Right MCA.





Mild dysarthria, right lateral gaze nystagmus, bilateral upper extremity 

twitching, bilateral lower extremity weakness. 





Continue telemetry, neurochecks every 4, statins, antiplatelets, fall, seizure 

and aspiration precaution.





PT/OT/ST.  Monitor vitals.








(3) Small bowel obstruction with strangulation or infarction


Is this a current diagnosis for this admission?: Yes   


Plan: 


Status post hernia repair.


P.o. tolerant, having normal bowel and bladder movements.  


Continue wound care, out of bed to chair as tolerated








(4) ESRD on hemodialysis


Is this a current diagnosis for this admission?: Yes   


Plan: 


Receiving hemodialysis Monday Wednesday Friday.  


Right permacath  removed due to blockage.


Currently right femoral catheter in place for hemodialysis


Surgery consulted for reinsertion permacath.  


Monitor electrolytes and volume status.  








(5) ESRD on peritoneal dialysis


Is this a current diagnosis for this admission?: No   


Plan: 


End-stage renal disease due to polycystic kidneys.  On chronic PD dialysis.


Currently patient receiving hemodialysis through [facility] catheter on the right 

IJ.


Outpatient PCP and nephrology follow-up.








(6) Ileus, postoperative


Is this a current diagnosis for this admission?: Yes   


Plan: 


Resolved.  Having normal bowel movements.








(7) Incarcerated umbilical hernia


Is this a current diagnosis for this admission?: Yes   


Plan: 


Status post surgical repair of umbilical hernia repair with mesh.  


Having normal bowel movement.  Nasogastric tube is removed. 








(8) Anemia in chronic kidney disease (CKD)


Qualifiers: 


   Chronic kidney disease stage: on chronic dialysis   Qualified Code(s): N18.6 

- End stage renal disease; D63.1 - Anemia in chronic kidney disease; Z99.2 - 

Dependence on renal dialysis   


Is this a current diagnosis for this admission?: No   


Plan: 


Hemoglobin slowly dropping, no evidence of acute bleeding.


Not a candidate for blood transfusion as patient is considering kidney 

transplantation in the future.


Also patient noted to have very brittle volume status, he is end-stage renal 

disease and on 11/23/2020 after receiving 500 mg bolus for his hypotension 

patient became hypoxic.


Monitor H&H, supportive transfusion if hemoglobin drops below 7 or acutely or 

becomes symptomatic.  


He is receiving Procrit administered by nephrology during hemodialysis.








(9) Hypertension


Qualifiers: 


   Hypertension type: essential hypertension   Qualified Code(s): I10 - 

Essential (primary) hypertension   


Is this a current diagnosis for this admission?: No   


Plan: 


Noted to have orthostatic hypotension.  Hold antihypertensives.  Monitor vitals.

 Resume once appropriate.








(10) Polycystic kidney disease


Is this a current diagnosis for this admission?: Yes   


Plan: 


Stable








(11) Anemia in CKD (chronic kidney disease)


Qualifiers: 


   Chronic kidney disease stage: on chronic dialysis   Qualified Code(s): N18.6 

- End stage renal disease; D63.1 - Anemia in chronic kidney disease; Z99.2 - 

Dependence on renal dialysis   


Is this a current diagnosis for this admission?: Yes   


Plan: 


Hemoglobin slowly dropping, no evidence of acute bleeding.


Not a candidate for blood transfusion as patient is considering kidney 

transplantation in the future.


Also patient noted to have very brittle volume status, he is end-stage renal 

disease and on 11/23/2020 after receiving 500 mg bolus for his hypotension 

patient became hypoxic.


Monitor H&H, supportive transfusion if hemoglobin drops below 7 or acutely or 

becomes symptomatic.  


He is receiving Procrit administered by nephrology during hemodialysis.








- Plan Summary


Summary: 


11/17/2020


8:15 PM


Critical care visit


40 minutes


I assessed the patient and then reviewed the use computer records.  I given the 

patient a liter of fluid earlier today.  His blood pressure responded.  He 

admits that he felt better.  This was just prior to the CAT scan.


He still has a large net negative fluid balance.  He has skin tenting and his 

oral mucosa is extremely dry.  He has sunken eye sockets.  His lungs are clear.


After reviewing all of the data including laboratory studies and imaging I 

believe the patient is hypovolemic and this is the reason for the tachycardia.  

In addition, early in his stay he is oliguric.  Of late he has been an uric.


If you compare the heart rate to the blood pressures his blood pressures have 

been relatively soft.


I believe that the patient's blood pressure and pulse are directly related to 

his volume status.  With the nurse at the bedside we discussed the treatment 

plan with the patient.


IV fluid


The patient will receive a 500 mL bolus and then run the remaining fluid at 150 

mL an hour for a total of 2 L of fluid overnight.


Hiccups


The hiccups have responded to the Thorazine.  I was decreasing the Thorazine 

dose.  At this point I will discontinue it as a scheduled medication but leave 

it available as needed every 12 hours for hiccups.  This medication is quite 

sedating and by removing it the patient may have more energy.


Cortisol


Random cortisol drawn early in this hospitalization was 99.  I have ordered a 

morning cortisol for tomorrow in addition to his normal laboratory studies.  I 

will see if there is any evidence of adrenal insufficiency.





- Time


Time Spent with patient: 25-34 minutes


Anticipated Discharge Disposition: Skilled Nursing Facility


Anticipated Discharge Timeframe: within 72 hours

## 2020-11-25 NOTE — PDOC PROGRESS REPORT
Subjective


Date:: 11/25/20


Reason For Visit: 


HYPOXIC RESPIRATORY FAILURE, NEEDS DIALYSIS,








Physical Exam


Vital Signs: 


                                        











Temp Pulse Resp BP Pulse Ox


 


 98.0 F   100   20   110/53 L  96 


 


 11/24/20 22:00  11/25/20 08:17  11/25/20 08:17  11/25/20 06:00  11/25/20 08:17








                                 Intake & Output











 11/24/20 11/25/20 11/26/20





 06:59 06:59 06:59


 


Intake Total 1164 751 


 


Output Total 1000  


 


Balance 164 751 


 


Weight 124.3 kg 126 kg 














Results


Laboratory Results: 


                                        





                                 11/25/20 05:45 





                                 11/25/20 05:45 





                                        











  11/24/20 11/25/20 11/25/20





  15:35 05:45 05:45


 


WBC   6.5 


 


RBC   2.13 L 


 


Hgb   6.8 L 


 


Hct   20.1 L 


 


MCV   95 


 


MCH   32.1 


 


MCHC   34.0 


 


RDW   15.9 H 


 


Plt Count   528 H 


 


Seg Neutrophils %   77.6 


 


Carbonic Acid  1.09  


 


HCO3/H2CO3 Ratio  24:1  


 


ABG pH  7.49 H  


 


ABG pCO2  36.2  


 


ABG pO2  65.6 L  


 


ABG HCO3  26.7 H  


 


ABG O2 Saturation  94.4  


 


ABG Base Excess  3.1  


 


FiO2  4L  


 


Sodium    132.6 L


 


Potassium    4.7


 


Chloride    94 L


 


Carbon Dioxide    26


 


Anion Gap    13


 


BUN    58 H


 


Creatinine    7.72 H


 


Est GFR (African Amer)    8 L


 


Glucose    121 H


 


Calcium    7.9 L


 


Blood Type   


 


Antibody Screen   














  11/25/20





  07:45


 


WBC 


 


RBC 


 


Hgb 


 


Hct 


 


MCV 


 


MCH 


 


MCHC 


 


RDW 


 


Plt Count 


 


Seg Neutrophils % 


 


Carbonic Acid 


 


HCO3/H2CO3 Ratio 


 


ABG pH 


 


ABG pCO2 


 


ABG pO2 


 


ABG HCO3 


 


ABG O2 Saturation 


 


ABG Base Excess 


 


FiO2 


 


Sodium 


 


Potassium 


 


Chloride 


 


Carbon Dioxide 


 


Anion Gap 


 


BUN 


 


Creatinine 


 


Est GFR (African Amer) 


 


Glucose 


 


Calcium 


 


Blood Type  A POSITIVE


 


Antibody Screen  NEGATIVE








                                        











  11/19/20 11/19/20 11/22/20





  20:10 20:10 05:15


 


Creatine Kinase  39 L   29 L


 


Troponin I   0.050 











Impressions: 


                                        





Guidance Fluoroscopy  11/09/20 00:00


IMPRESSION:  IMAGE(S) OBTAINED DURING PROCEDURE.


 








Abdomen/Pelvis CT  11/17/20 00:00


IMPRESSION:


1. NASOGASTRIC TUBE, TIP IN THE STOMACH.  CONTRAST IN THE STOMACH AND THROUGHOUT

THE SMALL BOWEL.  DILATED PROXIMAL SMALL BOWEL AND NONDISTENDED DISTAL SMALL 

BOWEL.  PROBABLY PARTIAL SMALL BOWEL OBSTRUCTION.


2. SMALL UMBILICAL HERNIA, CONTAINING A SMALL AMOUNT OF GAS PRESUMED SECONDARY 

TO RECENT SURGERY.


3. COLONIC DIVERTICULOSIS.  NO CT FINDINGS OF ACUTE DIVERTICULITIS.


4. CHRONIC POLYCYSTIC KIDNEY DISEASE.


5. PERITONEAL DIALYSIS CATHETER, DISTAL END IN THE PELVIS.


6. NO OTHER ACUTE OR SIGNIFICANT FINDINGS.


 








KUB X-Ray  11/18/20 00:00


IMPRESSION:  Persistent dilated at least proximal small bowel.  There is air and

stool as well as contrast throughout the colon.  Findings are consistent with 

partial small bowel obstruction.


 








Chest X-Ray  11/21/20 00:00


IMPRESSION:  NO ACUTE FINDINGS.


 








Head CT  11/21/20 17:32


IMPRESSION:  NO ACUTE INTRACRANIAL FINDINGS.


EVIDENCE OF ACUTE STROKE: NO.


 








Brain MRI with MRA  11/22/20 00:00


IMPRESSION:  NORMAL MRA OF THE [place] OF ROOT.


 








Head MRI  11/22/20 00:00


IMPRESSION:  MINIMAL MICROVASCULAR ISCHEMIC CHANGE.  SMALL FOCAL ACUTE LACUNAR 

INFARCTS IN THE POSTERIOR RIGHT PARIETAL LOBE.


EVIDENCE OF ACUTE STROKE: YES.  RIGHT MCA.


 














Assessment & Plan





- Diagnosis


(1) Small bowel obstruction with strangulation or infarction


Is this a current diagnosis for this admission?: Yes   





- Time


Anticipated Discharge Disposition: unknown


Anticipated Discharge Timeframe: unknown





- Plan Summary


Plan Summary: 





70-year-old male with a dysfunctional left subclavian permacath.  The patient is

currently refusing dialysis and all interventions.  He has requested comfort 

measures.  As I understand it, his medical doctors are discussing this with him 

today.  If he declines further dialysis, and wishes for comfort care, I will not

plan on further interventions (new permacath).  If the patient changes his mind,

and request permacath, please renotify me.  If necessary, permacath can be 

placed as early as Sunday.  Surgery will sign off at this time.

## 2020-11-25 NOTE — ADVANCED CARE
- Diagnosis


(1) Comfort measures only status


Diagnosis Current: Yes   





(2) Orthostatic hypotension


Diagnosis Current: Yes   





(3) Acute ischemic right MCA stroke


Diagnosis Current: Yes   





(4) Small bowel obstruction with strangulation or infarction


Diagnosis Current: Yes   





(5) ESRD on hemodialysis


Diagnosis Current: Yes   





(6) ESRD on peritoneal dialysis


Diagnosis Current: Yes   





(7) Ileus, postoperative


Diagnosis Current: Yes   





(8) Incarcerated umbilical hernia


Diagnosis Current: Yes   





(9) Anemia in chronic kidney disease (CKD)


Diagnosis Current: Yes   





(10) Hypertension


Diagnosis Current: Yes   





(11) Polycystic kidney disease


Diagnosis Current: Yes   





(12) Anemia in CKD (chronic kidney disease)


Diagnosis Current: Yes   


Resuscitation Status: Comfort Measures Only


Discussion: 


11/25/2020.  Has been having extensive discussion with patient and family since 

11/2020.  Patient was made DNR/DNI as per patient and family request on 

11/24/2020.  Saw patient twice on 11/25/2020, once in the morning when he was by

himself and and once when accompanied by his sister who is is only next of kin 

present in the room.  Patient is  and does not have any biological 

children. 





On both of my encounters patient is alert and oriented x4, only complaining of 

lower back pain. He has refused blood transfusion and hemodialysis today. He is 

stating that he does not want any life prolonging measures including pressors, 

hemodialysis, transfusions, or any medications which would prolong his life. 





He is stating that he clearly understands his underlying medical conditions and 

his prognosis and does not wish to continue any of his medications. Patient and 

family were counseled on CODE STATUS including DNR/DNI and CMO. He clearly does 

not want to be intubated or resuscitated, and he does not want to continue his 

pressors, antibiotics, or any medical intervention to prolong his life. 





He wishes to be transitioned to comfort measures only. He is stating if after 

stopping all his medication and any medical intervention he is still around he 

would like to be transitioned to hospice care either home hospice or inpatient 

hospice.  He also conveyed this patient is to his primary nurse.  Please nursing

refer to note.


Care Planning Goals: 


CMO


Document(s) Completed: 


DNR/DNI followed by CMO


Time Spent: 60

## 2020-11-25 NOTE — PDOC PROGRESS REPORT
Subjective


Date:: 11/25/20


Subjective:: 


As per admitting physician's note  ZAKIA GOODWIN is a 70 year old male, 

with history of end-stage renal disease on peritoneal dialysis (last session 

last night), Hx Hypercholesterolemia, Hx Hypertension, history of back surgery 

who has a 1 day complaining of mid abdominal pain around the umbilicus.  The 

patient has a known umbilical hernia that he has been following conservatively. 

A CT scan abdomen pelvis has been done upon arrival in the emergency room today 

and this is significant for a strangulated umbilical hernia with infarcted small

bowel and gas in the portal vein system.





11/21/2020.  No acute events overnight.  Patient awake and alert and cooperative

with physical examination.  Stating that he is avoiding eating or drinking as he

has odynophagia due to prolonged NG tube placement, patient was noted to be 

hypotensive and is stating that when he was trying to get up to do his physical 

therapy he was feeling lightheaded, denies any chest pain, nausea, vomiting, 

diarrhea, constipation or any urinary symptoms.





11/22/2020.  Assumed care on 11/21/2020.  On my first encounter patient was 

reporting to me that he was having odynophagia due to prolonged NG tube 

placement and as a result he was not eating much and not taking any fluid, he 

was also mentioning to me that whenever he tries to stand up or when he tries to

get physical therapy he feels lightheaded and dizzy, upon reviewing his vitals I

noted that patient has been having soft is BPs and once a while his systolic 

blood pressure has been in the high 80s and low 90s, I noted the patient has 

history of hypertension, and is on low-dose beta-blockers, I also noted that 

patient had been checked for random cortisol and a.m. cortisol which have been 

WNL.  I figured patient could be orthostatic due to low p.o. intake, held his 

antihypertensives and gave him 500 cc NS and also started him on GI cocktail 

which includes Reglan in it.  Patient was able to tolerate his p.o. intake 

without any problem, but unfortunately towards the evening patient was noted to 

be less responsive with slurred speech and code stroke was called. When code 

stroke was called patient vitals were WNL, a stat CT was negative for any acute 

stroke, and patient was not deemed suitable for TPA due to recent abdominal s

urgery.  Stroke protocol was initiated and patient was started on statins and 

antiplatelets. An ABG showed mild hypoxemia, his fluids were held and he was 

given 1 dose of IV Lasix. Patient was also given 1 dose of Narcan as he had 

received 1 dose of IV morphine, with no significant improvement.  This morning 

patient did not appear to be in acute distress but unfortunately neurologically 

he was unchanged, still drowsy but easily arousable, and having some expressive 

aphasia, bilateral lower extremity clonus and twitching of bilateral upper 

extremities and bilateral nystagmus. Yesterday patient had received Reglan in 

his GI cocktail yesterday, so I gave him IV Benadryl to see if his clonus and 

twitching was related to Reglan side effects, unfortunately no significant 

improvement noted, stat MRI/MRI head ordered which came back positive for small 

focal acute lacunar infarct in the posterior right parietal lobe.  Patient and 

family notified about the results. Patient is stating that he has had similar 

symptoms 2 years ago in [place] and was diagnosed with TIA.  Patient is stating 

that he was taking statin but it was stopped by his PCP, unfortunately patient 

is noted not to have been on antiplatelets however he has been receiving his 

subcutaneous heparin while inpatient.  Patient and family has agreed to be 

transition to short-term rehab if needed.





11/23/2020.  Saw patient this morning while waiting to receive hemodialysis in 

the hemodialysis unit, unfortunately patient has not improved neurologically, 

overnight patient was not started on dopamine drip as his pressure was consi

dered to be within normal limits, patient is still somnolent but easily 

arousable, has twitching of upper extremities, mild aphasia and bilateral lower 

extremity clonus more on the right side, upper extremity strength improvement, 

lower extremity strength has not changed, patient is alert and oriented, denies 

any fever, chills, nausea, vomiting.





11/24/2020.  No acute events overnight.  Patient overnight was noted to be 

hypotensive, started on dopamine drip, patient was maintained in 140s overnight,

this morning dopamine drip has been weaned down and currently pressure is in 

110s, unfortunately patient still seems a little confused, complaining of 

dizziness, mild dysarthria, bilateral upper extremity twitching, and lower 

extremity weakness, patient is p.o. tolerant, having normal bowel and bladder 

movements.  Denies any fever, chills, nausea, vomiting.





11/25/2020.  Has been having extensive discussion with patient and family since 

11/2020.  Patient was made DNR/DNI as per patient and family request on 

11/24/2020.  Saw patient twice on 11/25/2020, once in the morning when he was by

himself and and once when accompanied by his sister who is is only next of kin 

present in the room.  Patient is  and does not have any biological 

children. 





On both of my encounters patient is alert and oriented x4, only complaining of 

lower back pain. He has refused blood transfusion and hemodialysis today. He is 

stating that he does not want any life prolonging measures including pressors, 

hemodialysis, transfusions, or any medications which would prolong his life. 





He is stating that he clearly understands his underlying medical conditions and 

his prognosis and does not wish to continue any of his medications. Patient and 

family were counseled on CODE STATUS including DNR/DNI and CMO. He clearly does 

not want to be intubated or resuscitated, and he does not want to continue his 

pressors, antibiotics, or any medical intervention to prolong his life. 





He wishes to be transitioned to comfort measures only. He is stating if after 

stopping all his medication and any medical intervention he is still around he 

would like to be transitioned to hospice care either home hospice or inpatient 

hospice.  He also conveyed this patient is to his primary nurse.  Please nursing

refer to note.


Reason For Visit: 


HYPOXIC RESPIRATORY FAILURE, NEEDS DIALYSIS,








Physical Exam


Vital Signs: 


                                        











Temp Pulse Resp BP Pulse Ox


 


 98.0 F   93   20   119/56 L  86 L


 


 11/25/20 10:00  11/25/20 14:00  11/25/20 08:17  11/25/20 13:00  11/25/20 09:02








                                 Intake & Output











 11/24/20 11/25/20 11/26/20





 06:59 06:59 06:59


 


Intake Total 1164 751 


 


Output Total 1000  


 


Balance 164 751 


 


Weight 124.3 kg 126 kg 











General appearance: PRESENT: no acute distress, well-developed, well-nourished


Head exam: PRESENT: atraumatic, normocephalic


Respiratory exam: PRESENT: clear to auscultation tasha.  ABSENT: rales, rhonchi, 

wheezes


Cardiovascular exam: PRESENT: RRR.  ABSENT: diastolic murmur, rubs, systolic 

murmur


GI/Abdominal exam: PRESENT: normal bowel sounds, soft.  ABSENT: distended, 

guarding, mass, organolmegaly, rebound, tenderness


Neurological exam: PRESENT: alert, awake, oriented to person, oriented to place,

oriented to time, oriented to situation, CN II-XII grossly intact - Mild 

dysarthria Bilateral upper extremity twitching.





Results


Laboratory Results: 


                                        





                                 11/25/20 05:45 





                                 11/25/20 05:45 





                                        











  11/24/20 11/25/20 11/25/20





  15:35 05:45 05:45


 


WBC   6.5 


 


RBC   2.13 L 


 


Hgb   6.8 L 


 


Hct   20.1 L 


 


MCV   95 


 


MCH   32.1 


 


MCHC   34.0 


 


RDW   15.9 H 


 


Plt Count   528 H 


 


Seg Neutrophils %   77.6 


 


Carbonic Acid  1.09  


 


HCO3/H2CO3 Ratio  24:1  


 


ABG pH  7.49 H  


 


ABG pCO2  36.2  


 


ABG pO2  65.6 L  


 


ABG HCO3  26.7 H  


 


ABG O2 Saturation  94.4  


 


ABG Base Excess  3.1  


 


FiO2  4L  


 


Sodium    132.6 L


 


Potassium    4.7


 


Chloride    94 L


 


Carbon Dioxide    26


 


Anion Gap    13


 


BUN    58 H


 


Creatinine    7.72 H


 


Est GFR (African Amer)    8 L


 


Glucose    121 H


 


Calcium    7.9 L


 


Blood Type   


 


Antibody Screen   














  11/25/20





  07:45


 


WBC 


 


RBC 


 


Hgb 


 


Hct 


 


MCV 


 


MCH 


 


MCHC 


 


RDW 


 


Plt Count 


 


Seg Neutrophils % 


 


Carbonic Acid 


 


HCO3/H2CO3 Ratio 


 


ABG pH 


 


ABG pCO2 


 


ABG pO2 


 


ABG HCO3 


 


ABG O2 Saturation 


 


ABG Base Excess 


 


FiO2 


 


Sodium 


 


Potassium 


 


Chloride 


 


Carbon Dioxide 


 


Anion Gap 


 


BUN 


 


Creatinine 


 


Est GFR (African Amer) 


 


Glucose 


 


Calcium 


 


Blood Type  A POSITIVE


 


Antibody Screen  NEGATIVE








                                        











  11/19/20 11/19/20 11/22/20





  20:10 20:10 05:15


 


Creatine Kinase  39 L   29 L


 


Troponin I   0.050 











Impressions: 


                                        





Guidance Fluoroscopy  11/09/20 00:00


IMPRESSION:  IMAGE(S) OBTAINED DURING PROCEDURE.


 








Abdomen/Pelvis CT  11/17/20 00:00


IMPRESSION:


1. NASOGASTRIC TUBE, TIP IN THE STOMACH.  CONTRAST IN THE STOMACH AND THROUGHOUT

THE SMALL BOWEL.  DILATED PROXIMAL SMALL BOWEL AND NONDISTENDED DISTAL SMALL 

BOWEL.  PROBABLY PARTIAL SMALL BOWEL OBSTRUCTION.


2. SMALL UMBILICAL HERNIA, CONTAINING A SMALL AMOUNT OF GAS PRESUMED SECONDARY 

TO RECENT SURGERY.


3. COLONIC DIVERTICULOSIS.  NO CT FINDINGS OF ACUTE DIVERTICULITIS.


4. CHRONIC POLYCYSTIC KIDNEY DISEASE.


5. PERITONEAL DIALYSIS CATHETER, DISTAL END IN THE PELVIS.


6. NO OTHER ACUTE OR SIGNIFICANT FINDINGS.


 








KUB X-Ray  11/18/20 00:00


IMPRESSION:  Persistent dilated at least proximal small bowel.  There is air and

stool as well as contrast throughout the colon.  Findings are consistent with 

partial small bowel obstruction.


 








Chest X-Ray  11/21/20 00:00


IMPRESSION:  NO ACUTE FINDINGS.


 








Head CT  11/21/20 17:32


IMPRESSION:  NO ACUTE INTRACRANIAL FINDINGS.


EVIDENCE OF ACUTE STROKE: NO.


 








Brain MRI with MRA  11/22/20 00:00


IMPRESSION:  NORMAL MRA OF THE [place] OF ROOT.


 








Head MRI  11/22/20 00:00


IMPRESSION:  MINIMAL MICROVASCULAR ISCHEMIC CHANGE.  SMALL FOCAL ACUTE LACUNAR 

INFARCTS IN THE POSTERIOR RIGHT PARIETAL LOBE.


EVIDENCE OF ACUTE STROKE: YES.  RIGHT MCA.


 














Assessment and Plan





- Diagnosis


(1) Comfort measures only status


Is this a current diagnosis for this admission?: Yes   


Plan: 


11/25/2020. Patient was made DNR/DNI as per patient and family request on 

11/24/2020.  Saw patient twice today, once in the morning when he was by himself

and and once when his sister who is is only next of kin present in the room.  

Patient is  and does not have any biological children. On both of my 

encounters patient is alert and oriented x4, only complaining of lower back 

pain. 





He has refused blood transfusion and hemodialysis today. He is stating that he 

does not want any life prolonging measures including pressors, hemodialysis, 

transfusions, or any medications which would prolong his life. He is stating 

that he clearly understands his underlying medical conditions and his prognosis 

and does not wish to continue any of his medications. Patient and family were 

counseled on CODE STATUS including DNR/DNI and CMO. He clearly does not want to 

be intubated or resuscitated, and he does not want to continue his pressors, ant

ibiotics, or any medical intervention to prolong his life. 





He wishes to be transitioned to comfort measures only. He is stating if after 

stopping all his medication and any medical intervention he is still around he 

would like to be transitioned to hospice care either home hospice or inpatient 

hospice.  He also conveyed this patient is to his primary nurse.  Please nursing

refer to note.








(2) Orthostatic hypotension


Is this a current diagnosis for this admission?: Yes   





(3) Acute ischemic right MCA stroke


Is this a current diagnosis for this admission?: Yes   





(4) Small bowel obstruction with strangulation or infarction


Is this a current diagnosis for this admission?: Yes   





(5) ESRD on hemodialysis


Is this a current diagnosis for this admission?: Yes   





(6) ESRD on peritoneal dialysis


Is this a current diagnosis for this admission?: No   





(7) Ileus, postoperative


Is this a current diagnosis for this admission?: Yes   





(8) Incarcerated umbilical hernia


Is this a current diagnosis for this admission?: Yes   





(9) Anemia in chronic kidney disease (CKD)


Qualifiers: 


   Chronic kidney disease stage: on chronic dialysis   Qualified Code(s): N18.6 

- End stage renal disease; D63.1 - Anemia in chronic kidney disease; Z99.2 - 

Dependence on renal dialysis   


Is this a current diagnosis for this admission?: No   





(10) Hypertension


Qualifiers: 


   Hypertension type: essential hypertension   Qualified Code(s): I10 - 

Essential (primary) hypertension   


Is this a current diagnosis for this admission?: No   





(11) Polycystic kidney disease


Is this a current diagnosis for this admission?: Yes   





(12) Anemia in CKD (chronic kidney disease)


Qualifiers: 


   Chronic kidney disease stage: on chronic dialysis   Qualified Code(s): N18.6 

- End stage renal disease; D63.1 - Anemia in chronic kidney disease; Z99.2 - 

Dependence on renal dialysis   


Is this a current diagnosis for this admission?: Yes   





- Plan Summary


Summary: 


11/17/2020


8:15 PM


Critical care visit


40 minutes


I assessed the patient and then reviewed the use computer records.  I given the 

patient a liter of fluid earlier today.  His blood pressure responded.  He 

admits that he felt better.  This was just prior to the CAT scan.


He still has a large net negative fluid balance.  He has skin tenting and his 

oral mucosa is extremely dry.  He has sunken eye sockets.  His lungs are clear.


After reviewing all of the data including laboratory studies and imaging I 

believe the patient is hypovolemic and this is the reason for the tachycardia.  

In addition, early in his stay he is oliguric.  Of late he has been an uric.


If you compare the heart rate to the blood pressures his blood pressures have 

been relatively soft.


I believe that the patient's blood pressure and pulse are directly related to 

his volume status.  With the nurse at the bedside we discussed the treatment 

plan with the patient.


IV fluid


The patient will receive a 500 mL bolus and then run the remaining fluid at 150 

mL an hour for a total of 2 L of fluid overnight.


Hiccups


The hiccups have responded to the Thorazine.  I was decreasing the Thorazine 

dose.  At this point I will discontinue it as a scheduled medication but leave 

it available as needed every 12 hours for hiccups.  This medication is quite 

sedating and by removing it the patient may have more energy.


Cortisol


Random cortisol drawn early in this hospitalization was 99.  I have ordered a 

morning cortisol for tomorrow in addition to his normal laboratory studies.  I w

ill see if there is any evidence of adrenal insufficiency.





- Time


Time Spent with patient: 35 or more minutes


Medications reviewed and adjusted accordingly: Yes


Anticipated Discharge Disposition: Hospice Center


Anticipated Discharge Timeframe: O

## 2020-11-25 NOTE — PDOC PROGRESS REPORT
Subjective


Date:: 11/25/20


Subjective:: 


Patient's CODE STATUS was changed to DNR/DNI yesterday per patient's wishes.  

Was also told the night that the patient has been refusing treatment including 

dialysis as he indicated to the hospitalist, Dr. Retana and also his nurses on 

multiple shifts.





I went to see the patient with his treating nurse present this morning.  Patient

is awake and oriented x3 and has been responding to my questions appropriately 

although he has a little bit of dysarthria.  I again asked him what his wishes 

are concerning his dialysis treatments.  He told me that he tried doing dialysis

every day pertaining to his peritoneal dialysis for years and has just come to 

the point that he is tired and he just wants to sleep away.  He is very much 

aware that refusing treatments including dialysis will lead to his demise.  I 

explained to him how the toxin and fluid building up on his body without 

dialysis treatment will finally lead to his death in the next few days or so.  

Patient reiterated that he understands and he is just tired and does not want 

any further treatments.  He states that he just wants to be comfortable.  He did

say sorry but he does not want to go on anymore.  At that point I told him that 

we will respect his wishes.


Reason For Visit: 


HYPOXIC RESPIRATORY FAILURE, NEEDS DIALYSIS,








Physical Exam


Vital Signs: 


                                        











Temp Pulse Resp BP Pulse Ox


 


 98.0 F   100   20   110/53 L  96 


 


 11/24/20 22:00  11/25/20 08:17  11/25/20 08:17  11/25/20 06:00  11/25/20 08:17








                                 Intake & Output











 11/24/20 11/25/20 11/26/20





 06:59 06:59 06:59


 


Intake Total 1164 751 


 


Output Total 1000  


 


Balance 164 751 


 


Weight 124.3 kg 126 kg 











Exam: 





General appearance: PRESENT: no acute distress, cooperative, well-developed, 

well-nourished


Head exam: PRESENT: atraumatic, normocephalic


Eye exam: PRESENT: conjunctiva pale, PERRLA.  ABSENT: scleral icterus


Neck exam: ABSENT: JVD


Respiratory exam: PRESENT: Diminished breath sounds.  ABSENT: crackles, rales, 

rhonchi, unlabored, wheezes


Cardiovascular exam: PRESENT: Regular rate rhythm -+S1, +S2.  ABSENT: diastolic 

murmur, systolic murmur


GI/Abdominal exam: PRESENT: normal bowel sounds, soft.  ABSENT: guarding, mass, 

tenderness


Extremities exam: ABSENT: No edema


Neurological exam: PRESENT: alert, awake, oriented to person, place and time.  

Positive dysarthria


Skin exam: PRESENT: dry, warm,


Cardiovascular exam: PRESENT: +S1, +S2


GI/Abdominal exam: PRESENT: distended, normal bowel sounds, tenderness.  ABSENT:

guarding, organomegaly





Results


Laboratory Results: 


                                        





                                 11/25/20 05:45 





                                 11/25/20 05:45 





                                        











  11/24/20 11/25/20 11/25/20





  15:35 05:45 05:45


 


WBC   6.5 


 


RBC   2.13 L 


 


Hgb   6.8 L 


 


Hct   20.1 L 


 


MCV   95 


 


MCH   32.1 


 


MCHC   34.0 


 


RDW   15.9 H 


 


Plt Count   528 H 


 


Seg Neutrophils %   77.6 


 


Carbonic Acid  1.09  


 


HCO3/H2CO3 Ratio  24:1  


 


ABG pH  7.49 H  


 


ABG pCO2  36.2  


 


ABG pO2  65.6 L  


 


ABG HCO3  26.7 H  


 


ABG O2 Saturation  94.4  


 


ABG Base Excess  3.1  


 


FiO2  4L  


 


Sodium    132.6 L


 


Potassium    4.7


 


Chloride    94 L


 


Carbon Dioxide    26


 


Anion Gap    13


 


BUN    58 H


 


Creatinine    7.72 H


 


Est GFR (African Amer)    8 L


 


Glucose    121 H


 


Calcium    7.9 L








                                        











  11/19/20 11/19/20 11/22/20





  20:10 20:10 05:15


 


Creatine Kinase  39 L   29 L


 


Troponin I   0.050 











Impressions: 


                                        





Guidance Fluoroscopy  11/09/20 00:00


IMPRESSION:  IMAGE(S) OBTAINED DURING PROCEDURE.


 








Abdomen/Pelvis CT  11/17/20 00:00


IMPRESSION:


1. NASOGASTRIC TUBE, TIP IN THE STOMACH.  CONTRAST IN THE STOMACH AND THROUGHOUT

THE SMALL BOWEL.  DILATED PROXIMAL SMALL BOWEL AND NONDISTENDED DISTAL SMALL 

BOWEL.  PROBABLY PARTIAL SMALL BOWEL OBSTRUCTION.


2. SMALL UMBILICAL HERNIA, CONTAINING A SMALL AMOUNT OF GAS PRESUMED SECONDARY 

TO RECENT SURGERY.


3. COLONIC DIVERTICULOSIS.  NO CT FINDINGS OF ACUTE DIVERTICULITIS.


4. CHRONIC POLYCYSTIC KIDNEY DISEASE.


5. PERITONEAL DIALYSIS CATHETER, DISTAL END IN THE PELVIS.


6. NO OTHER ACUTE OR SIGNIFICANT FINDINGS.


 








KUB X-Ray  11/18/20 00:00


IMPRESSION:  Persistent dilated at least proximal small bowel.  There is air and

stool as well as contrast throughout the colon.  Findings are consistent with 

partial small bowel obstruction.


 








Chest X-Ray  11/21/20 00:00


IMPRESSION:  NO ACUTE FINDINGS.


 








Head CT  11/21/20 17:32


IMPRESSION:  NO ACUTE INTRACRANIAL FINDINGS.


EVIDENCE OF ACUTE STROKE: NO.


 








Brain MRI with MRA  11/22/20 00:00


IMPRESSION:  NORMAL MRA OF THE [place] OF ROOT.


 








Head MRI  11/22/20 00:00


IMPRESSION:  MINIMAL MICROVASCULAR ISCHEMIC CHANGE.  SMALL FOCAL ACUTE LACUNAR 

INFARCTS IN THE POSTERIOR RIGHT PARIETAL LOBE.


EVIDENCE OF ACUTE STROKE: YES.  RIGHT MCA.


 














Assessment & Plan





- Diagnosis


(1) End stage renal disease


Is this a current diagnosis for this admission?: Yes   


Plan: 


As per discussion with patient above, we will stop hemodialysis treatments per 

patient's wishes.








(2) Acute ischemic right MCA stroke


Is this a current diagnosis for this admission?: Yes   





(3) Hypotension


Is this a current diagnosis for this admission?: Yes   





(4) Hyperkalemia


Is this a current diagnosis for this admission?: Yes   





(5) Anemia in chronic kidney disease (CKD)


Qualifiers: 


   Chronic kidney disease stage: on chronic dialysis   Qualified Code(s): N18.6 

- End stage renal disease; D63.1 - Anemia in chronic kidney disease; Z99.2 - 

Dependence on renal dialysis   


Is this a current diagnosis for this admission?: No   





(6) Hyponatremia


Is this a current diagnosis for this admission?: Yes   





(7) Incarcerated umbilical hernia


Is this a current diagnosis for this admission?: Yes   





- Notes


Notes: 


I will sign off at this point.  Discussed with Dr. Retana.

## 2020-11-26 NOTE — PDOC PROGRESS REPORT
Subjective


Date:: 11/26/20


Subjective:: 


As per admitting physician's note  ZAKIA GOODWIN is a 70 year old male, 

with history of end-stage renal disease on peritoneal dialysis (last session 

last night), Hx Hypercholesterolemia, Hx Hypertension, history of back surgery 

who has a 1 day complaining of mid abdominal pain around the umbilicus.  The 

patient has a known umbilical hernia that he has been following conservatively. 

A CT scan abdomen pelvis has been done upon arrival in the emergency room today 

and this is significant for a strangulated umbilical hernia with infarcted small

bowel and gas in the portal vein system.





11/21/2020.  No acute events overnight.  Patient awake and alert and cooperative

with physical examination.  Stating that he is avoiding eating or drinking as he

has odynophagia due to prolonged NG tube placement, patient was noted to be 

hypotensive and is stating that when he was trying to get up to do his physical 

therapy he was feeling lightheaded, denies any chest pain, nausea, vomiting, 

diarrhea, constipation or any urinary symptoms.





11/22/2020.  Assumed care on 11/21/2020.  On my first encounter patient was 

reporting to me that he was having odynophagia due to prolonged NG tube 

placement and as a result he was not eating much and not taking any fluid, he 

was also mentioning to me that whenever he tries to stand up or when he tries to

get physical therapy he feels lightheaded and dizzy, upon reviewing his vitals I

noted that patient has been having soft is BPs and once a while his systolic 

blood pressure has been in the high 80s and low 90s, I noted the patient has 

history of hypertension, and is on low-dose beta-blockers, I also noted that 

patient had been checked for random cortisol and a.m. cortisol which have been 

WNL.  I figured patient could be orthostatic due to low p.o. intake, held his 

antihypertensives and gave him 500 cc NS and also started him on GI cocktail 

which includes Reglan in it.  Patient was able to tolerate his p.o. intake 

without any problem, but unfortunately towards the evening patient was noted to 

be less responsive with slurred speech and code stroke was called. When code 

stroke was called patient vitals were WNL, a stat CT was negative for any acute 

stroke, and patient was not deemed suitable for TPA due to recent abdominal s

urgery.  Stroke protocol was initiated and patient was started on statins and 

antiplatelets. An ABG showed mild hypoxemia, his fluids were held and he was 

given 1 dose of IV Lasix. Patient was also given 1 dose of Narcan as he had 

received 1 dose of IV morphine, with no significant improvement.  This morning 

patient did not appear to be in acute distress but unfortunately neurologically 

he was unchanged, still drowsy but easily arousable, and having some expressive 

aphasia, bilateral lower extremity clonus and twitching of bilateral upper 

extremities and bilateral nystagmus. Yesterday patient had received Reglan in 

his GI cocktail yesterday, so I gave him IV Benadryl to see if his clonus and 

twitching was related to Reglan side effects, unfortunately no significant 

improvement noted, stat MRI/MRI head ordered which came back positive for small 

focal acute lacunar infarct in the posterior right parietal lobe.  Patient and 

family notified about the results. Patient is stating that he has had similar 

symptoms 2 years ago in [place] and was diagnosed with TIA.  Patient is stating 

that he was taking statin but it was stopped by his PCP, unfortunately patient 

is noted not to have been on antiplatelets however he has been receiving his 

subcutaneous heparin while inpatient.  Patient and family has agreed to be 

transition to short-term rehab if needed.





11/23/2020.  Saw patient this morning while waiting to receive hemodialysis in 

the hemodialysis unit, unfortunately patient has not improved neurologically, 

overnight patient was not started on dopamine drip as his pressure was consi

dered to be within normal limits, patient is still somnolent but easily 

arousable, has twitching of upper extremities, mild aphasia and bilateral lower 

extremity clonus more on the right side, upper extremity strength improvement, 

lower extremity strength has not changed, patient is alert and oriented, denies 

any fever, chills, nausea, vomiting.





11/24/2020.  No acute events overnight.  Patient overnight was noted to be 

hypotensive, started on dopamine drip, patient was maintained in 140s overnight,

this morning dopamine drip has been weaned down and currently pressure is in 

110s, unfortunately patient still seems a little confused, complaining of 

dizziness, mild dysarthria, bilateral upper extremity twitching, and lower 

extremity weakness, patient is p.o. tolerant, having normal bowel and bladder 

movements.  Denies any fever, chills, nausea, vomiting.





11/25/2020.  Has been having extensive discussion with patient and family since 

11/2020.  Patient was made DNR/DNI as per patient and family request on 

11/24/2020.  Saw patient twice on 11/25/2020, once in the morning when he was by

himself and and once when accompanied by his sister who is is only next of kin 

present in the room.  Patient is  and does not have any biological 

children. 





On both of my encounters patient is alert and oriented x4, only complaining of 

lower back pain. He has refused blood transfusion and hemodialysis today. He is 

stating that he does not want any life prolonging measures including pressors, 

hemodialysis, transfusions, or any medications which would prolong his life. 





He is stating that he clearly understands his underlying medical conditions and 

his prognosis and does not wish to continue any of his medications. Patient and 

family were counseled on CODE STATUS including DNR/DNI and CMO. He clearly does 

not want to be intubated or resuscitated, and he does not want to continue his 

pressors, antibiotics, or any medical intervention to prolong his life. 





He wishes to be transitioned to comfort measures only. He is stating if after 

stopping all his medication and any medical intervention he is still around he 

would like to be transitioned to hospice care either home hospice or inpatient 

hospice.  He also conveyed this patient is to his primary nurse.  Please nursing

refer to note.





11/26/2020.  No acute events overnight.  Saw patient this morning, not appear to

be in any acute distress, awake and alert, worsening dysarthria, complaining of 

lower back pain, endorsing healthy appetite.  Denies any fever, chills, nausea, 

vomiting, chest pain, shortness of breath.


Reason For Visit: 


HYPOXIC RESPIRATORY FAILURE, NEEDS DIALYSIS,








Physical Exam


Vital Signs: 


                                        











Temp Pulse Resp BP Pulse Ox


 


 98.1 F   101 H  20   121/52 L  96 


 


 11/26/20 08:07  11/26/20 08:07  11/26/20 08:07  11/26/20 08:07  11/26/20 08:07








                                 Intake & Output











 11/25/20 11/26/20 11/27/20





 06:59 06:59 06:59


 


Intake Total 751 0 


 


Balance 751 0 


 


Weight 126 kg 124.3 kg 











General appearance: PRESENT: no acute distress, well-developed, well-nourished


Head exam: PRESENT: atraumatic, normocephalic


Respiratory exam: PRESENT: clear to auscultation tasha.  ABSENT: rales, rhonchi, 

wheezes


GI/Abdominal exam: PRESENT: normal bowel sounds, soft.  ABSENT: distended, 

guarding, mass, organolmegaly, rebound, tenderness


Neurological exam: PRESENT: alert, awake, oriented to person, oriented to place,

oriented to time, oriented to situation, CN II-XII grossly intact - Dysarthria.





Results


Laboratory Results: 


                                        





                                 11/25/20 05:45 





                                 11/25/20 05:45 





                                        











  11/19/20 11/19/20 11/22/20





  20:10 20:10 05:15


 


Creatine Kinase  39 L   29 L


 


Troponin I   0.050 











Impressions: 


                                        





Guidance Fluoroscopy  11/09/20 00:00


IMPRESSION:  IMAGE(S) OBTAINED DURING PROCEDURE.


 








Abdomen/Pelvis CT  11/17/20 00:00


IMPRESSION:


1. NASOGASTRIC TUBE, TIP IN THE STOMACH.  CONTRAST IN THE STOMACH AND THROUGHOUT

THE SMALL BOWEL.  DILATED PROXIMAL SMALL BOWEL AND NONDISTENDED DISTAL SMALL 

BOWEL.  PROBABLY PARTIAL SMALL BOWEL OBSTRUCTION.


2. SMALL UMBILICAL HERNIA, CONTAINING A SMALL AMOUNT OF GAS PRESUMED SECONDARY 

TO RECENT SURGERY.


3. COLONIC DIVERTICULOSIS.  NO CT FINDINGS OF ACUTE DIVERTICULITIS.


4. CHRONIC POLYCYSTIC KIDNEY DISEASE.


5. PERITONEAL DIALYSIS CATHETER, DISTAL END IN THE PELVIS.


6. NO OTHER ACUTE OR SIGNIFICANT FINDINGS.


 








KUB X-Ray  11/18/20 00:00


IMPRESSION:  Persistent dilated at least proximal small bowel.  There is air and

stool as well as contrast throughout the colon.  Findings are consistent with 

partial small bowel obstruction.


 








Chest X-Ray  11/21/20 00:00


IMPRESSION:  NO ACUTE FINDINGS.


 








Head CT  11/21/20 17:32


IMPRESSION:  NO ACUTE INTRACRANIAL FINDINGS.


EVIDENCE OF ACUTE STROKE: NO.


 








Brain MRI with MRA  11/22/20 00:00


IMPRESSION:  NORMAL MRA OF THE [place] OF ROOT.


 








Head MRI  11/22/20 00:00


IMPRESSION:  MINIMAL MICROVASCULAR ISCHEMIC CHANGE.  SMALL FOCAL ACUTE LACUNAR 

INFARCTS IN THE POSTERIOR RIGHT PARIETAL LOBE.


EVIDENCE OF ACUTE STROKE: YES.  RIGHT MCA.


 














Assessment and Plan





- Diagnosis


(1) Comfort measures only status


Is this a current diagnosis for this admission?: Yes   


Plan: 


Comfort measures only as of 11/25/2020.





11/25/2020. Patient was made DNR/DNI as per patient and family request on 

11/24/2020.  Saw patient twice today, once in the morning when he was by himself

and and once when his sister who is is only next of kin present in the room.  

Patient is  and does not have any biological children. On both of my 

encounters patient is alert and oriented x4, only complaining of lower back 

pain. 





He has refused blood transfusion and hemodialysis today. He is stating that he 

does not want any life prolonging measures including pressors, hemodialysis, 

transfusions, or any medications which would prolong his life. He is stating 

that he clearly understands his underlying medical conditions and his prognosis 

and does not wish to continue any of his medications. Patient and family were 

counseled on CODE STATUS including DNR/DNI and CMO. He clearly does not want to 

be intubated or resuscitated, and he does not want to continue his pressors, 

antibiotics, or any medical intervention to prolong his life. 





He wishes to be transitioned to comfort measures only. He is stating if after 

stopping all his medication and any medical intervention he is still around he 

would like to be transitioned to hospice care either home hospice or inpatient 

hospice.  He also conveyed this patient is to his primary nurse.  Please nursing

refer to note.








(2) Orthostatic hypotension


Is this a current diagnosis for this admission?: Yes   





(3) Acute ischemic right MCA stroke


Is this a current diagnosis for this admission?: Yes   





(4) Small bowel obstruction with strangulation or infarction


Is this a current diagnosis for this admission?: Yes   





(5) ESRD on hemodialysis


Is this a current diagnosis for this admission?: Yes   





(6) ESRD on peritoneal dialysis


Is this a current diagnosis for this admission?: No   





(7) Ileus, postoperative


Is this a current diagnosis for this admission?: Yes   





(8) Incarcerated umbilical hernia


Is this a current diagnosis for this admission?: Yes   





(9) Anemia in chronic kidney disease (CKD)


Qualifiers: 


   Chronic kidney disease stage: on chronic dialysis   Qualified Code(s): N18.6 

- End stage renal disease; D63.1 - Anemia in chronic kidney disease; Z99.2 - 

Dependence on renal dialysis   


Is this a current diagnosis for this admission?: No   





(10) Hypertension


Qualifiers: 


   Hypertension type: essential hypertension   Qualified Code(s): I10 - 

Essential (primary) hypertension   


Is this a current diagnosis for this admission?: No   





(11) Polycystic kidney disease


Is this a current diagnosis for this admission?: Yes   





(12) Anemia in CKD (chronic kidney disease)


Qualifiers: 


   Chronic kidney disease stage: on chronic dialysis   Qualified Code(s): N18.6 

- End stage renal disease; D63.1 - Anemia in chronic kidney disease; Z99.2 - 

Dependence on renal dialysis   


Is this a current diagnosis for this admission?: Yes   





- Plan Summary


Summary: 


11/17/2020


8:15 PM


Critical care visit


40 minutes


I assessed the patient and then reviewed the use computer records.  I given the 

patient a liter of fluid earlier today.  His blood pressure responded.  He 

admits that he felt better.  This was just prior to the CAT scan.


He still has a large net negative fluid balance.  He has skin tenting and his 

oral mucosa is extremely dry.  He has sunken eye sockets.  His lungs are clear.


After reviewing all of the data including laboratory studies and imaging I 

believe the patient is hypovolemic and this is the reason for the tachycardia.  

In addition, early in his stay he is oliguric.  Of late he has been an uric.


If you compare the heart rate to the blood pressures his blood pressures have 

been relatively soft.


I believe that the patient's blood pressure and pulse are directly related to 

his volume status.  With the nurse at the bedside we discussed the treatment pl

an with the patient.


IV fluid


The patient will receive a 500 mL bolus and then run the remaining fluid at 150 

mL an hour for a total of 2 L of fluid overnight.


Hiccups


The hiccups have responded to the Thorazine.  I was decreasing the Thorazine 

dose.  At this point I will discontinue it as a scheduled medication but leave 

it available as needed every 12 hours for hiccups.  This medication is quite 

sedating and by removing it the patient may have more energy.


Cortisol


Random cortisol drawn early in this hospitalization was 99.  I have ordered a 

morning cortisol for tomorrow in addition to his normal laboratory studies.  I 

will see if there is any evidence of adrenal insufficiency.





- Time


Time Spent with patient: 25-34 minutes


Medications reviewed and adjusted accordingly: Yes


Anticipated Discharge Disposition: CMO


Anticipated Discharge Timeframe: CMO

## 2020-11-27 NOTE — PDOC PROGRESS REPORT
Subjective


Date:: 11/27/20


Subjective:: 


As per admitting physician's note  ZAKIA GOODWIN is a 70 year old male, 

with history of end-stage renal disease on peritoneal dialysis (last session 

last night), Hx Hypercholesterolemia, Hx Hypertension, history of back surgery 

who has a 1 day complaining of mid abdominal pain around the umbilicus.  The 

patient has a known umbilical hernia that he has been following conservatively. 

A CT scan abdomen pelvis has been done upon arrival in the emergency room today 

and this is significant for a strangulated umbilical hernia with infarcted small

bowel and gas in the portal vein system.





11/21/2020.  No acute events overnight.  Patient awake and alert and cooperative

with physical examination.  Stating that he is avoiding eating or drinking as he

has odynophagia due to prolonged NG tube placement, patient was noted to be 

hypotensive and is stating that when he was trying to get up to do his physical 

therapy he was feeling lightheaded, denies any chest pain, nausea, vomiting, 

diarrhea, constipation or any urinary symptoms.





11/22/2020.  Assumed care on 11/21/2020.  On my first encounter patient was 

reporting to me that he was having odynophagia due to prolonged NG tube 

placement and as a result he was not eating much and not taking any fluid, he 

was also mentioning to me that whenever he tries to stand up or when he tries to

get physical therapy he feels lightheaded and dizzy, upon reviewing his vitals I

noted that patient has been having soft is BPs and once a while his systolic 

blood pressure has been in the high 80s and low 90s, I noted the patient has 

history of hypertension, and is on low-dose beta-blockers, I also noted that 

patient had been checked for random cortisol and a.m. cortisol which have been 

WNL.  I figured patient could be orthostatic due to low p.o. intake, held his 

antihypertensives and gave him 500 cc NS and also started him on GI cocktail 

which includes Reglan in it.  Patient was able to tolerate his p.o. intake 

without any problem, but unfortunately towards the evening patient was noted to 

be less responsive with slurred speech and code stroke was called. When code 

stroke was called patient vitals were WNL, a stat CT was negative for any acute 

stroke, and patient was not deemed suitable for TPA due to recent abdominal s

urgery.  Stroke protocol was initiated and patient was started on statins and 

antiplatelets. An ABG showed mild hypoxemia, his fluids were held and he was 

given 1 dose of IV Lasix. Patient was also given 1 dose of Narcan as he had 

received 1 dose of IV morphine, with no significant improvement.  This morning 

patient did not appear to be in acute distress but unfortunately neurologically 

he was unchanged, still drowsy but easily arousable, and having some expressive 

aphasia, bilateral lower extremity clonus and twitching of bilateral upper 

extremities and bilateral nystagmus. Yesterday patient had received Reglan in 

his GI cocktail yesterday, so I gave him IV Benadryl to see if his clonus and 

twitching was related to Reglan side effects, unfortunately no significant 

improvement noted, stat MRI/MRI head ordered which came back positive for small 

focal acute lacunar infarct in the posterior right parietal lobe.  Patient and 

family notified about the results. Patient is stating that he has had similar 

symptoms 2 years ago in [place] and was diagnosed with TIA.  Patient is stating 

that he was taking statin but it was stopped by his PCP, unfortunately patient 

is noted not to have been on antiplatelets however he has been receiving his 

subcutaneous heparin while inpatient.  Patient and family has agreed to be 

transition to short-term rehab if needed.





11/23/2020.  Saw patient this morning while waiting to receive hemodialysis in 

the hemodialysis unit, unfortunately patient has not improved neurologically, 

overnight patient was not started on dopamine drip as his pressure was consi

dered to be within normal limits, patient is still somnolent but easily 

arousable, has twitching of upper extremities, mild aphasia and bilateral lower 

extremity clonus more on the right side, upper extremity strength improvement, 

lower extremity strength has not changed, patient is alert and oriented, denies 

any fever, chills, nausea, vomiting.





11/24/2020.  No acute events overnight.  Patient overnight was noted to be 

hypotensive, started on dopamine drip, patient was maintained in 140s overnight,

this morning dopamine drip has been weaned down and currently pressure is in 

110s, unfortunately patient still seems a little confused, complaining of 

dizziness, mild dysarthria, bilateral upper extremity twitching, and lower 

extremity weakness, patient is p.o. tolerant, having normal bowel and bladder 

movements.  Denies any fever, chills, nausea, vomiting.





11/25/2020.  Has been having extensive discussion with patient and family since 

11/2020.  Patient was made DNR/DNI as per patient and family request on 

11/24/2020.  Saw patient twice on 11/25/2020, once in the morning when he was by

himself and and once when accompanied by his sister who is is only next of kin 

present in the room.  Patient is  and does not have any biological 

children. 





On both of my encounters patient is alert and oriented x4, only complaining of 

lower back pain. He has refused blood transfusion and hemodialysis today. He is 

stating that he does not want any life prolonging measures including pressors, 

hemodialysis, transfusions, or any medications which would prolong his life. 





He is stating that he clearly understands his underlying medical conditions and 

his prognosis and does not wish to continue any of his medications. Patient and 

family were counseled on CODE STATUS including DNR/DNI and CMO. He clearly does 

not want to be intubated or resuscitated, and he does not want to continue his 

pressors, antibiotics, or any medical intervention to prolong his life. 





He wishes to be transitioned to comfort measures only. He is stating if after 

stopping all his medication and any medical intervention he is still around he 

would like to be transitioned to hospice care either home hospice or inpatient 

hospice.  He also conveyed this patient is to his primary nurse.  Please nursing

refer to note.





11/26/2020.  No acute events overnight.  Saw patient this morning, not appear to

be in any acute distress, awake and alert, worsening dysarthria, complaining of 

lower back pain, endorsing healthy appetite.  Denies any fever, chills, nausea, 

vomiting, chest pain, shortness of breath.





11/27/2020.  No acute events overnight.  Saw patient this morning, patient does 

not respond to verbal or tactile stimuli.  Not appear to be in any apparent 

distress.


Reason For Visit: 


HYPOXIC RESPIRATORY FAILURE, NEEDS DIALYSIS,








Physical Exam


Vital Signs: 


                                        











Temp Pulse Resp BP Pulse Ox


 


 98.2 F   102 H  21 H  110/64   98 


 


 11/27/20 10:00  11/27/20 08:00  11/27/20 08:00  11/27/20 08:00  11/27/20 08:00








                                 Intake & Output











 11/26/20 11/27/20 11/28/20





 06:59 06:59 06:59


 


Intake Total 0 476 


 


Output Total  0 


 


Balance 0 476 


 


Weight 124.3 kg 124 kg 











General appearance: PRESENT: no acute distress, obese, well-developed, well-

nourished


Respiratory exam: PRESENT: clear to auscultation tasha.  ABSENT: rales, rhonchi, 

wheezes


Neurological exam: PRESENT: other - Does not respond to tactile or verbal 

stimuli.  Does not appear to be in any apparent distress.





Results


Laboratory Results: 


                                        





                                 11/25/20 05:45 





                                 11/25/20 05:45 





                                        











  11/19/20 11/19/20 11/22/20





  20:10 20:10 05:15


 


Creatine Kinase  39 L   29 L


 


Troponin I   0.050 











Impressions: 


                                        





Guidance Fluoroscopy  11/09/20 00:00


IMPRESSION:  IMAGE(S) OBTAINED DURING PROCEDURE.


 








Abdomen/Pelvis CT  11/17/20 00:00


IMPRESSION:


1. NASOGASTRIC TUBE, TIP IN THE STOMACH.  CONTRAST IN THE STOMACH AND THROUGHOUT

THE SMALL BOWEL.  DILATED PROXIMAL SMALL BOWEL AND NONDISTENDED DISTAL SMALL 

BOWEL.  PROBABLY PARTIAL SMALL BOWEL OBSTRUCTION.


2. SMALL UMBILICAL HERNIA, CONTAINING A SMALL AMOUNT OF GAS PRESUMED SECONDARY 

TO RECENT SURGERY.


3. COLONIC DIVERTICULOSIS.  NO CT FINDINGS OF ACUTE DIVERTICULITIS.


4. CHRONIC POLYCYSTIC KIDNEY DISEASE.


5. PERITONEAL DIALYSIS CATHETER, DISTAL END IN THE PELVIS.


6. NO OTHER ACUTE OR SIGNIFICANT FINDINGS.


 








KUB X-Ray  11/18/20 00:00


IMPRESSION:  Persistent dilated at least proximal small bowel.  There is air and

stool as well as contrast throughout the colon.  Findings are consistent with 

partial small bowel obstruction.


 








Chest X-Ray  11/21/20 00:00


IMPRESSION:  NO ACUTE FINDINGS.


 








Head CT  11/21/20 17:32


IMPRESSION:  NO ACUTE INTRACRANIAL FINDINGS.


EVIDENCE OF ACUTE STROKE: NO.


 








Brain MRI with MRA  11/22/20 00:00


IMPRESSION:  NORMAL MRA OF THE [place] OF ROOT.


 








Head MRI  11/22/20 00:00


IMPRESSION:  MINIMAL MICROVASCULAR ISCHEMIC CHANGE.  SMALL FOCAL ACUTE LACUNAR 

INFARCTS IN THE POSTERIOR RIGHT PARIETAL LOBE.


EVIDENCE OF ACUTE STROKE: YES.  RIGHT MCA.


 














Assessment and Plan





- Diagnosis


(1) Comfort measures only status


Is this a current diagnosis for this admission?: Yes   


Plan: 


Comfort measures only as of 11/25/2020.





11/25/2020. Patient was made DNR/DNI as per patient and family request on 

11/24/2020.  Saw patient twice today, once in the morning when he was by himself

and and once when his sister who is is only next of kin present in the room.  

Patient is  and does not have any biological children. On both of my 

encounters patient is alert and oriented x4, only complaining of lower back 

pain. 





He has refused blood transfusion and hemodialysis today. He is stating that he 

does not want any life prolonging measures including pressors, hemodialysis, 

transfusions, or any medications which would prolong his life. He is stating 

that he clearly understands his underlying medical conditions and his prognosis 

and does not wish to continue any of his medications. Patient and family were 

counseled on CODE STATUS including DNR/DNI and CMO. He clearly does not want to 

be intubated or resuscitated, and he does not want to continue his pressors, 

antibiotics, or any medical intervention to prolong his life. 





He wishes to be transitioned to comfort measures only. He is stating if after 

stopping all his medication and any medical intervention he is still around he 

would like to be transitioned to hospice care either home hospice or inpatient 

hospice.  He also conveyed this patient is to his primary nurse.  Please nursing

refer to note.








(2) Orthostatic hypotension


Is this a current diagnosis for this admission?: Yes   





(3) Acute ischemic right MCA stroke


Is this a current diagnosis for this admission?: Yes   





(4) Small bowel obstruction with strangulation or infarction


Is this a current diagnosis for this admission?: Yes   





(5) ESRD on hemodialysis


Is this a current diagnosis for this admission?: Yes   





(6) ESRD on peritoneal dialysis


Is this a current diagnosis for this admission?: No   





(7) Ileus, postoperative


Is this a current diagnosis for this admission?: Yes   





(8) Incarcerated umbilical hernia


Is this a current diagnosis for this admission?: Yes   





(9) Anemia in chronic kidney disease (CKD)


Qualifiers: 


   Chronic kidney disease stage: on chronic dialysis   Qualified Code(s): N18.6 

- End stage renal disease; D63.1 - Anemia in chronic kidney disease; Z99.2 - 

Dependence on renal dialysis   


Is this a current diagnosis for this admission?: No   





(10) Hypertension


Qualifiers: 


   Hypertension type: essential hypertension   Qualified Code(s): I10 - 

Essential (primary) hypertension   


Is this a current diagnosis for this admission?: No   





(11) Polycystic kidney disease


Is this a current diagnosis for this admission?: Yes   





(12) Anemia in CKD (chronic kidney disease)


Qualifiers: 


   Chronic kidney disease stage: on chronic dialysis   Qualified Code(s): N18.6 

- End stage renal disease; D63.1 - Anemia in chronic kidney disease; Z99.2 - 

Dependence on renal dialysis   


Is this a current diagnosis for this admission?: Yes   





- Plan Summary


Summary: 


11/17/2020


8:15 PM


Critical care visit


40 minutes


I assessed the patient and then reviewed the use computer records.  I given the 

patient a liter of fluid earlier today.  His blood pressure responded.  He 

admits that he felt better.  This was just prior to the CAT scan.


He still has a large net negative fluid balance.  He has skin tenting and his 

oral mucosa is extremely dry.  He has sunken eye sockets.  His lungs are clear.


After reviewing all of the data including laboratory studies and imaging I 

believe the patient is hypovolemic and this is the reason for the tachycardia.  

In addition, early in his stay he is oliguric.  Of late he has been an uric.


If you compare the heart rate to the blood pressures his blood pressures have 

been relatively soft.


I believe that the patient's blood pressure and pulse are directly related to 

his volume status.  With the nurse at the bedside we discussed the treatment 

plan with the patient.


IV fluid


The patient will receive a 500 mL bolus and then run the remaining fluid at 150 

mL an hour for a total of 2 L of fluid overnight.


Hiccups


The hiccups have responded to the Thorazine.  I was decreasing the Thorazine 

dose.  At this point I will discontinue it as a scheduled medication but leave 

it available as needed every 12 hours for hiccups.  This medication is quite 

sedating and by removing it the patient may have more energy.


Cortisol


Random cortisol drawn early in this hospitalization was 99.  I have ordered a 

morning cortisol for tomorrow in addition to his normal laboratory studies.  I 

will see if there is any evidence of adrenal insufficiency.





- Time


Time Spent with patient: 15-24 minutes


Medications reviewed and adjusted accordingly: Yes


Anticipated Discharge Disposition: CMO


Anticipated Discharge Timeframe: CMO

## 2020-11-28 NOTE — PDOC PROGRESS REPORT
Subjective


Date:: 11/28/20


Subjective:: 


As per admitting physician's note  ZAKIA GOODWIN is a 70 year old male, 

with history of end-stage renal disease on peritoneal dialysis (last session 

last night), Hx Hypercholesterolemia, Hx Hypertension, history of back surgery 

who has a 1 day complaining of mid abdominal pain around the umbilicus.  The 

patient has a known umbilical hernia that he has been following conservatively. 

A CT scan abdomen pelvis has been done upon arrival in the emergency room today 

and this is significant for a strangulated umbilical hernia with infarcted small

bowel and gas in the portal vein system.





11/21/2020.  No acute events overnight.  Patient awake and alert and cooperative

with physical examination.  Stating that he is avoiding eating or drinking as he

has odynophagia due to prolonged NG tube placement, patient was noted to be 

hypotensive and is stating that when he was trying to get up to do his physical 

therapy he was feeling lightheaded, denies any chest pain, nausea, vomiting, 

diarrhea, constipation or any urinary symptoms.





11/22/2020.  Assumed care on 11/21/2020.  On my first encounter patient was 

reporting to me that he was having odynophagia due to prolonged NG tube 

placement and as a result he was not eating much and not taking any fluid, he 

was also mentioning to me that whenever he tries to stand up or when he tries to

get physical therapy he feels lightheaded and dizzy, upon reviewing his vitals I

noted that patient has been having soft is BPs and once a while his systolic 

blood pressure has been in the high 80s and low 90s, I noted the patient has 

history of hypertension, and is on low-dose beta-blockers, I also noted that 

patient had been checked for random cortisol and a.m. cortisol which have been 

WNL.  I figured patient could be orthostatic due to low p.o. intake, held his 

antihypertensives and gave him 500 cc NS and also started him on GI cocktail 

which includes Reglan in it.  Patient was able to tolerate his p.o. intake 

without any problem, but unfortunately towards the evening patient was noted to 

be less responsive with slurred speech and code stroke was called. When code 

stroke was called patient vitals were WNL, a stat CT was negative for any acute 

stroke, and patient was not deemed suitable for TPA due to recent abdominal s

urgery.  Stroke protocol was initiated and patient was started on statins and 

antiplatelets. An ABG showed mild hypoxemia, his fluids were held and he was 

given 1 dose of IV Lasix. Patient was also given 1 dose of Narcan as he had 

received 1 dose of IV morphine, with no significant improvement.  This morning 

patient did not appear to be in acute distress but unfortunately neurologically 

he was unchanged, still drowsy but easily arousable, and having some expressive 

aphasia, bilateral lower extremity clonus and twitching of bilateral upper 

extremities and bilateral nystagmus. Yesterday patient had received Reglan in 

his GI cocktail yesterday, so I gave him IV Benadryl to see if his clonus and 

twitching was related to Reglan side effects, unfortunately no significant 

improvement noted, stat MRI/MRI head ordered which came back positive for small 

focal acute lacunar infarct in the posterior right parietal lobe.  Patient and 

family notified about the results. Patient is stating that he has had similar 

symptoms 2 years ago in [place] and was diagnosed with TIA.  Patient is stating 

that he was taking statin but it was stopped by his PCP, unfortunately patient 

is noted not to have been on antiplatelets however he has been receiving his 

subcutaneous heparin while inpatient.  Patient and family has agreed to be 

transition to short-term rehab if needed.





11/23/2020.  Saw patient this morning while waiting to receive hemodialysis in 

the hemodialysis unit, unfortunately patient has not improved neurologically, 

overnight patient was not started on dopamine drip as his pressure was consi

dered to be within normal limits, patient is still somnolent but easily 

arousable, has twitching of upper extremities, mild aphasia and bilateral lower 

extremity clonus more on the right side, upper extremity strength improvement, 

lower extremity strength has not changed, patient is alert and oriented, denies 

any fever, chills, nausea, vomiting.





11/24/2020.  No acute events overnight.  Patient overnight was noted to be 

hypotensive, started on dopamine drip, patient was maintained in 140s overnight,

this morning dopamine drip has been weaned down and currently pressure is in 

110s, unfortunately patient still seems a little confused, complaining of 

dizziness, mild dysarthria, bilateral upper extremity twitching, and lower 

extremity weakness, patient is p.o. tolerant, having normal bowel and bladder 

movements.  Denies any fever, chills, nausea, vomiting.





11/25/2020.  Has been having extensive discussion with patient and family since 

11/2020.  Patient was made DNR/DNI as per patient and family request on 

11/24/2020.  Saw patient twice on 11/25/2020, once in the morning when he was by

himself and and once when accompanied by his sister who is is only next of kin 

present in the room.  Patient is  and does not have any biological 

children. 





On both of my encounters patient is alert and oriented x4, only complaining of 

lower back pain. He has refused blood transfusion and hemodialysis today. He is 

stating that he does not want any life prolonging measures including pressors, 

hemodialysis, transfusions, or any medications which would prolong his life. 





He is stating that he clearly understands his underlying medical conditions and 

his prognosis and does not wish to continue any of his medications. Patient and 

family were counseled on CODE STATUS including DNR/DNI and CMO. He clearly does 

not want to be intubated or resuscitated, and he does not want to continue his 

pressors, antibiotics, or any medical intervention to prolong his life. 





He wishes to be transitioned to comfort measures only. He is stating if after 

stopping all his medication and any medical intervention he is still around he 

would like to be transitioned to hospice care either home hospice or inpatient 

hospice.  He also conveyed this patient is to his primary nurse.  Please nursing

refer to note.





11/26/2020.  No acute events overnight.  Saw patient this morning, not appear to

be in any acute distress, awake and alert, worsening dysarthria, complaining of 

lower back pain, endorsing healthy appetite.  Denies any fever, chills, nausea, 

vomiting, chest pain, shortness of breath.





11/27/2020.  No acute events overnight.  Saw patient this morning, patient does 

not respond to verbal or tactile stimuli.  Not appear to be in any apparent 

distress.





11/28/2020.  No acute events overnight.  Saw patient twice this morning, once 

accompanied by 2 of her sisters and wants by himself, does not appear to be in 

apparent distress, patient is opening his eyes little stimulation does not 

communicate, does not appear to be in any apparent distress, will continue 

comfort measures.


Reason For Visit: 


HYPOXIC RESPIRATORY FAILURE, NEEDS DIALYSIS,








Physical Exam


Vital Signs: 


                                        











Temp Pulse Resp BP Pulse Ox


 


 99.4 F   109 H  20   111/49 L  88 L


 


 11/28/20 10:00  11/28/20 07:59  11/28/20 07:59  11/28/20 07:59  11/28/20 07:59








                                 Intake & Output











 11/27/20 11/28/20 11/29/20





 06:59 06:59 06:59


 


Intake Total 476  


 


Output Total 0 0 


 


Balance 476 0 


 


Weight 124 kg 122.3 kg 122.3 kg











General appearance: PRESENT: no acute distress, obese, well-developed, well-

nourished


Head exam: PRESENT: atraumatic, normocephalic


Respiratory exam: PRESENT: clear to auscultation tasha, rhonchi.  ABSENT: rales, 

wheezes


Cardiovascular exam: PRESENT: RRR, tachycardia.  ABSENT: diastolic murmur, rubs,

systolic murmur


GI/Abdominal exam: PRESENT: distended, normal bowel sounds, soft


Neurological exam: PRESENT: other - Opens eyes to verbal assimilation.  

Twitching of bilateral upper extremities.





Results


Laboratory Results: 


                                        





                                 11/25/20 05:45 





                                 11/25/20 05:45 





                                        





11/22/20 11:12   Blood   Blood Culture - Final


                            NO GROWTH IN 5 DAYS


11/22/20 11:20   Blood   Blood Culture - Final


                            NO GROWTH IN 5 DAYS





                                        











  11/19/20 11/19/20 11/22/20





  20:10 20:10 05:15


 


Creatine Kinase  39 L   29 L


 


Troponin I   0.050 











Impressions: 


                                        





Guidance Fluoroscopy  11/09/20 00:00


IMPRESSION:  IMAGE(S) OBTAINED DURING PROCEDURE.


 








Abdomen/Pelvis CT  11/17/20 00:00


IMPRESSION:


1. NASOGASTRIC TUBE, TIP IN THE STOMACH.  CONTRAST IN THE STOMACH AND THROUGHOUT

THE SMALL BOWEL.  DILATED PROXIMAL SMALL BOWEL AND NONDISTENDED DISTAL SMALL 

BOWEL.  PROBABLY PARTIAL SMALL BOWEL OBSTRUCTION.


2. SMALL UMBILICAL HERNIA, CONTAINING A SMALL AMOUNT OF GAS PRESUMED SECONDARY 

TO RECENT SURGERY.


3. COLONIC DIVERTICULOSIS.  NO CT FINDINGS OF ACUTE DIVERTICULITIS.


4. CHRONIC POLYCYSTIC KIDNEY DISEASE.


5. PERITONEAL DIALYSIS CATHETER, DISTAL END IN THE PELVIS.


6. NO OTHER ACUTE OR SIGNIFICANT FINDINGS.


 








KUB X-Ray  11/18/20 00:00


IMPRESSION:  Persistent dilated at least proximal small bowel.  There is air and

stool as well as contrast throughout the colon.  Findings are consistent with 

partial small bowel obstruction.


 








Chest X-Ray  11/21/20 00:00


IMPRESSION:  NO ACUTE FINDINGS.


 








Head CT  11/21/20 17:32


IMPRESSION:  NO ACUTE INTRACRANIAL FINDINGS.


EVIDENCE OF ACUTE STROKE: NO.


 








Brain MRI with MRA  11/22/20 00:00


IMPRESSION:  NORMAL MRA OF THE [place] OF ROOT.


 








Head MRI  11/22/20 00:00


IMPRESSION:  MINIMAL MICROVASCULAR ISCHEMIC CHANGE.  SMALL FOCAL ACUTE LACUNAR 

INFARCTS IN THE POSTERIOR RIGHT PARIETAL LOBE.


EVIDENCE OF ACUTE STROKE: YES.  RIGHT MCA.


 














Assessment and Plan





- Diagnosis


(1) Comfort measures only status


Is this a current diagnosis for this admission?: Yes   


Plan: 


Comfort measures only as of 11/25/2020.





11/25/2020. Patient was made DNR/DNI as per patient and family request on 11/24 /2020.  Saw patient twice today, once in the morning when he was by himself and 

and once when his sister who is is only next of kin present in the room.  

Patient is  and does not have any biological children. On both of my 

encounters patient is alert and oriented x4, only complaining of lower back 

pain. 





He has refused blood transfusion and hemodialysis today. He is stating that he 

does not want any life prolonging measures including pressors, hemodialysis, 

transfusions, or any medications which would prolong his life. He is stating 

that he clearly understands his underlying medical conditions and his prognosis 

and does not wish to continue any of his medications. Patient and family were 

counseled on CODE STATUS including DNR/DNI and CMO. He clearly does not want to 

be intubated or resuscitated, and he does not want to continue his pressors, 

antibiotics, or any medical intervention to prolong his life. 





He wishes to be transitioned to comfort measures only. He is stating if after 

stopping all his medication and any medical intervention he is still around he 

would like to be transitioned to hospice care either home hospice or inpatient 

hospice.  He also conveyed this patient is to his primary nurse.  Please nursing

refer to note.








(2) Orthostatic hypotension


Is this a current diagnosis for this admission?: Yes   





(3) Acute ischemic right MCA stroke


Is this a current diagnosis for this admission?: Yes   





(4) Small bowel obstruction with strangulation or infarction


Is this a current diagnosis for this admission?: Yes   





(5) ESRD on hemodialysis


Is this a current diagnosis for this admission?: Yes   





(6) ESRD on peritoneal dialysis


Is this a current diagnosis for this admission?: No   





(7) Ileus, postoperative


Is this a current diagnosis for this admission?: Yes   





(8) Incarcerated umbilical hernia


Is this a current diagnosis for this admission?: Yes   





(9) Anemia in chronic kidney disease (CKD)


Qualifiers: 


   Chronic kidney disease stage: on chronic dialysis   Qualified Code(s): N18.6 

- End stage renal disease; D63.1 - Anemia in chronic kidney disease; Z99.2 - 

Dependence on renal dialysis   


Is this a current diagnosis for this admission?: No   





(10) Hypertension


Qualifiers: 


   Hypertension type: essential hypertension   Qualified Code(s): I10 - 

Essential (primary) hypertension   


Is this a current diagnosis for this admission?: No   





(11) Polycystic kidney disease


Is this a current diagnosis for this admission?: Yes   





(12) Anemia in CKD (chronic kidney disease)


Qualifiers: 


   Chronic kidney disease stage: on chronic dialysis   Qualified Code(s): N18.6 

- End stage renal disease; D63.1 - Anemia in chronic kidney disease; Z99.2 - 

Dependence on renal dialysis   


Is this a current diagnosis for this admission?: Yes   





- Plan Summary


Summary: 


11/17/2020


8:15 PM


Critical care visit


40 minutes


I assessed the patient and then reviewed the use computer records.  I given the 

patient a liter of fluid earlier today.  His blood pressure responded.  He 

admits that he felt better.  This was just prior to the CAT scan.


He still has a large net negative fluid balance.  He has skin tenting and his 

oral mucosa is extremely dry.  He has sunken eye sockets.  His lungs are clear.


After reviewing all of the data including laboratory studies and imaging I 

believe the patient is hypovolemic and this is the reason for the tachycardia.  

In addition, early in his stay he is oliguric.  Of late he has been an uric.


If you compare the heart rate to the blood pressures his blood pressures have 

been relatively soft.


I believe that the patient's blood pressure and pulse are directly related to 

his volume status.  With the nurse at the bedside we discussed the treatment 

plan with the patient.


IV fluid


The patient will receive a 500 mL bolus and then run the remaining fluid at 150 

mL an hour for a total of 2 L of fluid overnight.


Hiccups


The hiccups have responded to the Thorazine.  I was decreasing the Thorazine 

dose.  At this point I will discontinue it as a scheduled medication but leave 

it available as needed every 12 hours for hiccups.  This medication is quite 

sedating and by removing it the patient may have more energy.


Cortisol


Random cortisol drawn early in this hospitalization was 99.  I have ordered a 

morning cortisol for tomorrow in addition to his normal laboratory studies.  I 

will see if there is any evidence of adrenal insufficiency.





- Time


Time Spent with patient: 35 or more minutes


Medications reviewed and adjusted accordingly: Yes


Anticipated Discharge Disposition: CMO


Anticipated Discharge Timeframe: O

## 2020-11-29 NOTE — PDOC PROGRESS REPORT
Subjective


Date:: 11/29/20


Subjective:: 


As per admitting physician's note  ZAKIA GOODWIN is a 70 year old male, 

with history of end-stage renal disease on peritoneal dialysis (last session 

last night), Hx Hypercholesterolemia, Hx Hypertension, history of back surgery 

who has a 1 day complaining of mid abdominal pain around the umbilicus.  The 

patient has a known umbilical hernia that he has been following conservatively. 

A CT scan abdomen pelvis has been done upon arrival in the emergency room today 

and this is significant for a strangulated umbilical hernia with infarcted small

bowel and gas in the portal vein system.





11/21/2020.  No acute events overnight.  Patient awake and alert and cooperative

with physical examination.  Stating that he is avoiding eating or drinking as he

has odynophagia due to prolonged NG tube placement, patient was noted to be 

hypotensive and is stating that when he was trying to get up to do his physical 

therapy he was feeling lightheaded, denies any chest pain, nausea, vomiting, 

diarrhea, constipation or any urinary symptoms.





11/22/2020.  Assumed care on 11/21/2020.  On my first encounter patient was 

reporting to me that he was having odynophagia due to prolonged NG tube 

placement and as a result he was not eating much and not taking any fluid, he 

was also mentioning to me that whenever he tries to stand up or when he tries to

get physical therapy he feels lightheaded and dizzy, upon reviewing his vitals I

noted that patient has been having soft is BPs and once a while his systolic 

blood pressure has been in the high 80s and low 90s, I noted the patient has 

history of hypertension, and is on low-dose beta-blockers, I also noted that 

patient had been checked for random cortisol and a.m. cortisol which have been 

WNL.  I figured patient could be orthostatic due to low p.o. intake, held his 

antihypertensives and gave him 500 cc NS and also started him on GI cocktail 

which includes Reglan in it.  Patient was able to tolerate his p.o. intake 

without any problem, but unfortunately towards the evening patient was noted to 

be less responsive with slurred speech and code stroke was called. When code 

stroke was called patient vitals were WNL, a stat CT was negative for any acute 

stroke, and patient was not deemed suitable for TPA due to recent abdominal s

urgery.  Stroke protocol was initiated and patient was started on statins and 

antiplatelets. An ABG showed mild hypoxemia, his fluids were held and he was 

given 1 dose of IV Lasix. Patient was also given 1 dose of Narcan as he had 

received 1 dose of IV morphine, with no significant improvement.  This morning 

patient did not appear to be in acute distress but unfortunately neurologically 

he was unchanged, still drowsy but easily arousable, and having some expressive 

aphasia, bilateral lower extremity clonus and twitching of bilateral upper 

extremities and bilateral nystagmus. Yesterday patient had received Reglan in 

his GI cocktail yesterday, so I gave him IV Benadryl to see if his clonus and 

twitching was related to Reglan side effects, unfortunately no significant 

improvement noted, stat MRI/MRI head ordered which came back positive for small 

focal acute lacunar infarct in the posterior right parietal lobe.  Patient and 

family notified about the results. Patient is stating that he has had similar 

symptoms 2 years ago in [place] and was diagnosed with TIA.  Patient is stating 

that he was taking statin but it was stopped by his PCP, unfortunately patient 

is noted not to have been on antiplatelets however he has been receiving his 

subcutaneous heparin while inpatient.  Patient and family has agreed to be 

transition to short-term rehab if needed.





11/23/2020.  Saw patient this morning while waiting to receive hemodialysis in 

the hemodialysis unit, unfortunately patient has not improved neurologically, 

overnight patient was not started on dopamine drip as his pressure was consi

dered to be within normal limits, patient is still somnolent but easily 

arousable, has twitching of upper extremities, mild aphasia and bilateral lower 

extremity clonus more on the right side, upper extremity strength improvement, 

lower extremity strength has not changed, patient is alert and oriented, denies 

any fever, chills, nausea, vomiting.





11/24/2020.  No acute events overnight.  Patient overnight was noted to be 

hypotensive, started on dopamine drip, patient was maintained in 140s overnight,

this morning dopamine drip has been weaned down and currently pressure is in 

110s, unfortunately patient still seems a little confused, complaining of 

dizziness, mild dysarthria, bilateral upper extremity twitching, and lower 

extremity weakness, patient is p.o. tolerant, having normal bowel and bladder 

movements.  Denies any fever, chills, nausea, vomiting.





11/25/2020.  Has been having extensive discussion with patient and family since 

11/2020.  Patient was made DNR/DNI as per patient and family request on 

11/24/2020.  Saw patient twice on 11/25/2020, once in the morning when he was by

himself and and once when accompanied by his sister who is is only next of kin 

present in the room.  Patient is  and does not have any biological 

children. 





On both of my encounters patient is alert and oriented x4, only complaining of 

lower back pain. He has refused blood transfusion and hemodialysis today. He is 

stating that he does not want any life prolonging measures including pressors, 

hemodialysis, transfusions, or any medications which would prolong his life. 





He is stating that he clearly understands his underlying medical conditions and 

his prognosis and does not wish to continue any of his medications. Patient and 

family were counseled on CODE STATUS including DNR/DNI and CMO. He clearly does 

not want to be intubated or resuscitated, and he does not want to continue his 

pressors, antibiotics, or any medical intervention to prolong his life. 





He wishes to be transitioned to comfort measures only. He is stating if after 

stopping all his medication and any medical intervention he is still around he 

would like to be transitioned to hospice care either home hospice or inpatient 

hospice.  He also conveyed this patient is to his primary nurse.  Please nursing

refer to note.





11/26/2020.  No acute events overnight.  Saw patient this morning, not appear to

be in any acute distress, awake and alert, worsening dysarthria, complaining of 

lower back pain, endorsing healthy appetite.  Denies any fever, chills, nausea, 

vomiting, chest pain, shortness of breath.





11/27/2020.  No acute events overnight.  Saw patient this morning, patient does 

not respond to verbal or tactile stimuli.  Not appear to be in any apparent 

distress.





11/28/2020.  No acute events overnight.  Saw patient twice this morning, once 

accompanied by 2 of her sisters and wants by himself, does not appear to be in 

apparent distress, patient is opening his eyes little stimulation does not 

communicate, does not appear to be in any apparent distress, will continue 

comfort measures.





11/29/2020.  No acute events overnight.  Saw patient this morning, appears to be

in no apparent distress, noted to have agonal breathing, does not open his eyes 

to verbal tactile stimuli.


Reason For Visit: 


HYPOXIC RESPIRATORY FAILURE, NEEDS DIALYSIS,








Physical Exam


Vital Signs: 


                                        











Temp Pulse Resp BP Pulse Ox


 


 99.9 F   106 H  14   96/40 L  84 L


 


 11/29/20 07:52  11/29/20 07:52  11/29/20 07:52  11/29/20 07:52  11/29/20 07:52








                                 Intake & Output











 11/28/20 11/29/20 11/30/20





 06:59 06:59 06:59


 


Output Total 0 0 


 


Balance 0 0 


 


Weight 122.3 kg 122.4 kg 











General appearance: PRESENT: no acute distress, obese, well-developed, well-

nourished


Neck exam: ABSENT: carotid bruit, JVD, lymphadenopathy, thyromegaly


Respiratory exam: PRESENT: rhonchi.  ABSENT: rales, wheezes


GI/Abdominal exam: PRESENT: normal bowel sounds, soft.  ABSENT: distended, 

guarding, mass, organolmegaly, rebound, tenderness


Neurological exam: PRESENT: other - Does not respond to verbal or tactile 

stimulation.





Results


Laboratory Results: 


                                        





                                 11/25/20 05:45 





                                 11/25/20 05:45 





                                        











  11/19/20 11/19/20 11/22/20





  20:10 20:10 05:15


 


Creatine Kinase  39 L   29 L


 


Troponin I   0.050 











Impressions: 


                                        





Guidance Fluoroscopy  11/09/20 00:00


IMPRESSION:  IMAGE(S) OBTAINED DURING PROCEDURE.


 








Abdomen/Pelvis CT  11/17/20 00:00


IMPRESSION:


1. NASOGASTRIC TUBE, TIP IN THE STOMACH.  CONTRAST IN THE STOMACH AND THROUGHOUT

THE SMALL BOWEL.  DILATED PROXIMAL SMALL BOWEL AND NONDISTENDED DISTAL SMALL 

BOWEL.  PROBABLY PARTIAL SMALL BOWEL OBSTRUCTION.


2. SMALL UMBILICAL HERNIA, CONTAINING A SMALL AMOUNT OF GAS PRESUMED SECONDARY 

TO RECENT SURGERY.


3. COLONIC DIVERTICULOSIS.  NO CT FINDINGS OF ACUTE DIVERTICULITIS.


4. CHRONIC POLYCYSTIC KIDNEY DISEASE.


5. PERITONEAL DIALYSIS CATHETER, DISTAL END IN THE PELVIS.


6. NO OTHER ACUTE OR SIGNIFICANT FINDINGS.


 








KUB X-Ray  11/18/20 00:00


IMPRESSION:  Persistent dilated at least proximal small bowel.  There is air and

stool as well as contrast throughout the colon.  Findings are consistent with 

partial small bowel obstruction.


 








Chest X-Ray  11/21/20 00:00


IMPRESSION:  NO ACUTE FINDINGS.


 








Head CT  11/21/20 17:32


IMPRESSION:  NO ACUTE INTRACRANIAL FINDINGS.


EVIDENCE OF ACUTE STROKE: NO.


 








Brain MRI with MRA  11/22/20 00:00


IMPRESSION:  NORMAL MRA OF THE [place] OF ROOT.


 








Head MRI  11/22/20 00:00


IMPRESSION:  MINIMAL MICROVASCULAR ISCHEMIC CHANGE.  SMALL FOCAL ACUTE LACUNAR 

INFARCTS IN THE POSTERIOR RIGHT PARIETAL LOBE.


EVIDENCE OF ACUTE STROKE: YES.  RIGHT MCA.


 














Assessment and Plan





- Diagnosis


(1) Comfort measures only status


Is this a current diagnosis for this admission?: Yes   


Plan: 


Comfort measures only as of 11/25/2020.





11/25/2020. Patient was made DNR/DNI as per patient and family request on 

11/24/2020.  Saw patient twice today, once in the morning when he was by himself

and and once when his sister who is is only next of kin present in the room.  

Patient is  and does not have any biological children. On both of my 

encounters patient is alert and oriented x4, only complaining of lower back 

pain. 





He has refused blood transfusion and hemodialysis today. He is stating that he 

does not want any life prolonging measures including pressors, hemodialysis, 

transfusions, or any medications which would prolong his life. He is stating 

that he clearly understands his underlying medical conditions and his prognosis 

and does not wish to continue any of his medications. Patient and family were 

counseled on CODE STATUS including DNR/DNI and CMO. He clearly does not want to 

be intubated or resuscitated, and he does not want to continue his pressors, 

antibiotics, or any medical intervention to prolong his life. 





He wishes to be transitioned to comfort measures only. He is stating if after 

stopping all his medication and any medical intervention he is still around he 

would like to be transitioned to hospice care either home hospice or inpatient 

hospice.  He also conveyed this patient is to his primary nurse.  Please nursing

refer to note.








(2) Orthostatic hypotension


Is this a current diagnosis for this admission?: Yes   





(3) Acute ischemic right MCA stroke


Is this a current diagnosis for this admission?: Yes   





(4) Small bowel obstruction with strangulation or infarction


Is this a current diagnosis for this admission?: Yes   





(5) ESRD on hemodialysis


Is this a current diagnosis for this admission?: Yes   





(6) ESRD on peritoneal dialysis


Is this a current diagnosis for this admission?: No   





(7) Ileus, postoperative


Is this a current diagnosis for this admission?: Yes   





(8) Incarcerated umbilical hernia


Is this a current diagnosis for this admission?: Yes   





(9) Anemia in chronic kidney disease (CKD)


Qualifiers: 


   Chronic kidney disease stage: on chronic dialysis   Qualified Code(s): N18.6 

- End stage renal disease; D63.1 - Anemia in chronic kidney disease; Z99.2 - 

Dependence on renal dialysis   


Is this a current diagnosis for this admission?: No   





(10) Hypertension


Qualifiers: 


   Hypertension type: essential hypertension   Qualified Code(s): I10 - Essenti

al (primary) hypertension   


Is this a current diagnosis for this admission?: No   





(11) Polycystic kidney disease


Is this a current diagnosis for this admission?: Yes   





(12) Anemia in CKD (chronic kidney disease)


Qualifiers: 


   Chronic kidney disease stage: on chronic dialysis   Qualified Code(s): N18.6 

- End stage renal disease; D63.1 - Anemia in chronic kidney disease; Z99.2 - 

Dependence on renal dialysis   


Is this a current diagnosis for this admission?: Yes   





- Plan Summary


Summary: 


11/17/2020


8:15 PM


Critical care visit


40 minutes


I assessed the patient and then reviewed the use computer records.  I given the 

patient a liter of fluid earlier today.  His blood pressure responded.  He admit

s that he felt better.  This was just prior to the CAT scan.


He still has a large net negative fluid balance.  He has skin tenting and his 

oral mucosa is extremely dry.  He has sunken eye sockets.  His lungs are clear.


After reviewing all of the data including laboratory studies and imaging I 

believe the patient is hypovolemic and this is the reason for the tachycardia.  

In addition, early in his stay he is oliguric.  Of late he has been an uric.


If you compare the heart rate to the blood pressures his blood pressures have 

been relatively soft.


I believe that the patient's blood pressure and pulse are directly related to 

his volume status.  With the nurse at the bedside we discussed the treatment 

plan with the patient.


IV fluid


The patient will receive a 500 mL bolus and then run the remaining fluid at 150 

mL an hour for a total of 2 L of fluid overnight.


Hiccups


The hiccups have responded to the Thorazine.  I was decreasing the Thorazine 

dose.  At this point I will discontinue it as a scheduled medication but leave 

it available as needed every 12 hours for hiccups.  This medication is quite 

sedating and by removing it the patient may have more energy.


Cortisol


Random cortisol drawn early in this hospitalization was 99.  I have ordered a 

morning cortisol for tomorrow in addition to his normal laboratory studies.  I 

will see if there is any evidence of adrenal insufficiency.





- Time


Time Spent with patient: 15-24 minutes


Anticipated Discharge Disposition: cmo


Anticipated Discharge Timeframe: cmo

## 2020-11-29 NOTE — EKG REPORT
SEVERITY:- DEFECTIVE ECG -

UNDETERMINED RHYTHM: REVIEW = STRAIGHT LINE EKG AT REQUEST OF PROVIDER ? PURPOSE ? TO DOCUMENT ASYSTO
LE ?, CLINICAL CORRELATION NEEDED.

NO FURTHER ANALYSIS ATTEMPTED FOR THIS ECG - NOT ENOUGH LEADS COULD BE MEASURED

:

Confirmed by: Chandler Espinal MD 29-Nov-2020 15:08:43

## 2020-11-29 NOTE — DEATH SUMMARY
Death Summary


Date : 20


Resuscitation Status: Comfort Measures Only





- Final Diagnosis


(1) Comfort measures only status


Is this a current diagnosis for this admission?: Yes   





(2) Orthostatic hypotension


Is this a current diagnosis for this admission?: Yes   





(3) Acute ischemic right MCA stroke


Is this a current diagnosis for this admission?: Yes   





(4) Small bowel obstruction with strangulation or infarction


Is this a current diagnosis for this admission?: Yes   





(5) ESRD on hemodialysis


Is this a current diagnosis for this admission?: Yes   





(6) ESRD on peritoneal dialysis


Is this a current diagnosis for this admission?: Yes   





(7) Ileus, postoperative


Is this a current diagnosis for this admission?: Yes   





(8) Incarcerated umbilical hernia


Is this a current diagnosis for this admission?: Yes   





(9) Anemia in chronic kidney disease (CKD)


Is this a current diagnosis for this admission?: Yes   





(10) Hypertension


Is this a current diagnosis for this admission?: Yes   





(11) Polycystic kidney disease


Is this a current diagnosis for this admission?: Yes   





(12) Anemia in CKD (chronic kidney disease)


Is this a current diagnosis for this admission?: Yes   


Hospital Course:: 


Patient with past medical history of polycystic kidney disease with end-stage 

renal disease who used to be on peritoneal dialysis, anemia of CKD, 

hypertension, initially was admitted for incarcerated abdominal hernia and small

bowel obstruction under surgery, patient underwent repair of abdominal hernia 

and small bowel obstruction resolved. 





Patient was receiving scheduled hemodialysis while inpatient, unfortunately 

patient was noted to be hypotensive, was placed on dopamine drip and extensive 

work-up done was negative for any acute findings, patient also had acute 

ischemic right MCA stroke while being hypotensive, and he developed bilateral 

lower extremity weakness and twitching, dysarthria, and lateral nystagmus with 

on and off obtundation. Patient's neurological symptoms were improving, once 

patient became alert and oriented x4, at some point he decided to start refusing

his medical care, initially patient refused hemodialysis stop taking his 

medication, CODE STATUS was transitioned to DNR/DNI as per patient and family's 

request.  The following day as per his CODE STATUS to be changed to CMO.  Even 

after extensive extensive encouragement from myself and family members patient 

is still kept insisting on his CODE STATUS to be changed to CMO.  Patient 

remains an alert oriented x4 while deciding his CODE STATUS and did not appear 

to be depressed and denied being depressed.  Patient's CODE STATUS was 

transitioned to CMO and on 2020 at 12 PM patient  peacefully at 

[facility].





(1) Orthostatic hypotension


(2) Acute ischemic right MCA stroke


(3) Small bowel obstruction with strangulation or infarction


(4) ESRD on hemodialysis


(5) ESRD on peritoneal dialysis


(6) Ileus, postoperative


(7) Incarcerated umbilical hernia


(8) Anemia in chronic kidney disease (CKD)


(9) Hypertension


(10) Polycystic kidney disease


(11) Anemia in CKD (chronic kidney disease)

## 2022-04-01 NOTE — RADIOLOGY REPORT (SQ)
EXAM DESCRIPTION:  CHEST SINGLE VIEW



COMPLETED DATE/TIME:  2/5/2018 12:08 pm



REASON FOR STUDY:  PREOP



COMPARISON:  AP chest 12/3/2017



EXAM PARAMETERS:  NUMBER OF VIEWS: One view.

TECHNIQUE: Single frontal radiographic view of the chest acquired.

RADIATION DOSE: NA

LIMITATIONS: None.



FINDINGS:  LUNGS AND PLEURA: No opacities, masses or pneumothorax. No pleural effusion.

MEDIASTINUM AND HILAR STRUCTURES: No masses.  Contour normal.

HEART AND VASCULAR STRUCTURES: Heart normal in size.  Normal vasculature.

BONES: No acute findings.

HARDWARE: None in the chest.

OTHER: No other significant finding.



IMPRESSION:  NO ACUTE RADIOGRAPHIC FINDING IN THE CHEST.



TECHNICAL DOCUMENTATION:  JOB ID:  [id number]

 2011 [employer]- All Rights Reserved You can access the FollowMyHealth Patient Portal offered by [facility] by registering at the following website: [email or web address]. By joining [facility]’s FollowMyHealth portal, you will also be able to view your health information using other applications (apps) compatible with our system.